# Patient Record
Sex: FEMALE | Race: BLACK OR AFRICAN AMERICAN | Employment: UNEMPLOYED | URBAN - METROPOLITAN AREA
[De-identification: names, ages, dates, MRNs, and addresses within clinical notes are randomized per-mention and may not be internally consistent; named-entity substitution may affect disease eponyms.]

---

## 2020-03-29 ENCOUNTER — ED HISTORICAL/CONVERTED ENCOUNTER (OUTPATIENT)
Dept: OTHER | Age: 55
End: 2020-03-29

## 2020-07-05 ENCOUNTER — HOSPITAL ENCOUNTER (INPATIENT)
Age: 55
LOS: 3 days | Discharge: HOME | DRG: 192 | End: 2020-07-10
Attending: EMERGENCY MEDICINE | Admitting: STUDENT IN AN ORGANIZED HEALTH CARE EDUCATION/TRAINING PROGRAM

## 2020-07-05 ENCOUNTER — APPOINTMENT (OUTPATIENT)
Dept: GENERAL RADIOLOGY | Age: 55
DRG: 192 | End: 2020-07-05
Attending: EMERGENCY MEDICINE

## 2020-07-05 DIAGNOSIS — E87.6 HYPOKALEMIA: ICD-10-CM

## 2020-07-05 DIAGNOSIS — J44.1 COPD EXACERBATION (HCC): Primary | ICD-10-CM

## 2020-07-05 LAB
ALBUMIN SERPL-MCNC: 3.5 G/DL (ref 3.5–5)
ALBUMIN/GLOB SERPL: 0.9 {RATIO} (ref 1.1–2.2)
ALP SERPL-CCNC: 79 U/L (ref 45–117)
ALT SERPL-CCNC: 13 U/L (ref 12–78)
ANION GAP SERPL CALC-SCNC: 8 MMOL/L (ref 5–15)
AST SERPL-CCNC: 12 U/L (ref 15–37)
BASOPHILS # BLD: 0 K/UL (ref 0–0.1)
BASOPHILS NFR BLD: 0 % (ref 0–1)
BILIRUB SERPL-MCNC: 0.7 MG/DL (ref 0.2–1)
BUN SERPL-MCNC: 9 MG/DL (ref 6–20)
BUN/CREAT SERPL: 13 (ref 12–20)
CALCIUM SERPL-MCNC: 8.5 MG/DL (ref 8.5–10.1)
CHLORIDE SERPL-SCNC: 105 MMOL/L (ref 97–108)
CK SERPL-CCNC: 80 U/L (ref 26–192)
CO2 SERPL-SCNC: 29 MMOL/L (ref 21–32)
COVID-19 RAPID TEST, COVR: NOT DETECTED
CREAT SERPL-MCNC: 0.72 MG/DL (ref 0.55–1.02)
DIFFERENTIAL METHOD BLD: ABNORMAL
EOSINOPHIL # BLD: 0.1 K/UL (ref 0–0.4)
EOSINOPHIL NFR BLD: 1 % (ref 0–7)
ERYTHROCYTE [DISTWIDTH] IN BLOOD BY AUTOMATED COUNT: 16 % (ref 11.5–14.5)
GLOBULIN SER CALC-MCNC: 3.9 G/DL (ref 2–4)
GLUCOSE SERPL-MCNC: 130 MG/DL (ref 65–100)
HCT VFR BLD AUTO: 44.7 % (ref 35–47)
HGB BLD-MCNC: 14.3 G/DL (ref 11.5–16)
IMM GRANULOCYTES # BLD AUTO: 0 K/UL (ref 0–0.04)
IMM GRANULOCYTES NFR BLD AUTO: 0 % (ref 0–0.5)
LYMPHOCYTES # BLD: 2.6 K/UL (ref 0.8–3.5)
LYMPHOCYTES NFR BLD: 29 % (ref 12–49)
MAGNESIUM SERPL-MCNC: 1.9 MG/DL (ref 1.6–2.4)
MCH RBC QN AUTO: 27.8 PG (ref 26–34)
MCHC RBC AUTO-ENTMCNC: 32 G/DL (ref 30–36.5)
MCV RBC AUTO: 86.8 FL (ref 80–99)
MONOCYTES # BLD: 0.6 K/UL (ref 0–1)
MONOCYTES NFR BLD: 7 % (ref 5–13)
NEUTS SEG # BLD: 5.7 K/UL (ref 1.8–8)
NEUTS SEG NFR BLD: 63 % (ref 32–75)
NRBC # BLD: 0 K/UL (ref 0–0.01)
NRBC BLD-RTO: 0 PER 100 WBC
PLATELET # BLD AUTO: 278 K/UL (ref 150–400)
PMV BLD AUTO: 10.5 FL (ref 8.9–12.9)
POTASSIUM SERPL-SCNC: 2.6 MMOL/L (ref 3.5–5.1)
POTASSIUM SERPL-SCNC: 4.7 MMOL/L (ref 3.5–5.1)
PROT SERPL-MCNC: 7.4 G/DL (ref 6.4–8.2)
RBC # BLD AUTO: 5.15 M/UL (ref 3.8–5.2)
SARS-COV-2, COV2: NOT DETECTED
SODIUM SERPL-SCNC: 142 MMOL/L (ref 136–145)
SOURCE, COVRS: NORMAL
SPECIMEN SOURCE, FCOV2M: NORMAL
SPECIMEN SOURCE, FCOV2M: NORMAL
TROPONIN I SERPL-MCNC: <0.05 NG/ML
TROPONIN I SERPL-MCNC: <0.05 NG/ML
WBC # BLD AUTO: 9 K/UL (ref 3.6–11)

## 2020-07-05 PROCEDURE — 96376 TX/PRO/DX INJ SAME DRUG ADON: CPT

## 2020-07-05 PROCEDURE — 83735 ASSAY OF MAGNESIUM: CPT

## 2020-07-05 PROCEDURE — 74011000250 HC RX REV CODE- 250: Performed by: EMERGENCY MEDICINE

## 2020-07-05 PROCEDURE — 84132 ASSAY OF SERUM POTASSIUM: CPT

## 2020-07-05 PROCEDURE — 84484 ASSAY OF TROPONIN QUANT: CPT

## 2020-07-05 PROCEDURE — 99218 HC RM OBSERVATION: CPT

## 2020-07-05 PROCEDURE — 71045 X-RAY EXAM CHEST 1 VIEW: CPT

## 2020-07-05 PROCEDURE — 96367 TX/PROPH/DG ADDL SEQ IV INF: CPT

## 2020-07-05 PROCEDURE — 93005 ELECTROCARDIOGRAM TRACING: CPT

## 2020-07-05 PROCEDURE — 94640 AIRWAY INHALATION TREATMENT: CPT

## 2020-07-05 PROCEDURE — 96365 THER/PROPH/DIAG IV INF INIT: CPT

## 2020-07-05 PROCEDURE — 94760 N-INVAS EAR/PLS OXIMETRY 1: CPT

## 2020-07-05 PROCEDURE — 36415 COLL VENOUS BLD VENIPUNCTURE: CPT

## 2020-07-05 PROCEDURE — 80053 COMPREHEN METABOLIC PANEL: CPT

## 2020-07-05 PROCEDURE — 94761 N-INVAS EAR/PLS OXIMETRY MLT: CPT

## 2020-07-05 PROCEDURE — 96375 TX/PRO/DX INJ NEW DRUG ADDON: CPT

## 2020-07-05 PROCEDURE — 87635 SARS-COV-2 COVID-19 AMP PRB: CPT

## 2020-07-05 PROCEDURE — 74011250637 HC RX REV CODE- 250/637: Performed by: STUDENT IN AN ORGANIZED HEALTH CARE EDUCATION/TRAINING PROGRAM

## 2020-07-05 PROCEDURE — 74011250637 HC RX REV CODE- 250/637: Performed by: EMERGENCY MEDICINE

## 2020-07-05 PROCEDURE — 82550 ASSAY OF CK (CPK): CPT

## 2020-07-05 PROCEDURE — 74011250636 HC RX REV CODE- 250/636: Performed by: EMERGENCY MEDICINE

## 2020-07-05 PROCEDURE — 96366 THER/PROPH/DIAG IV INF ADDON: CPT

## 2020-07-05 PROCEDURE — 77030029684 HC NEB SM VOL KT MONA -A

## 2020-07-05 PROCEDURE — 96372 THER/PROPH/DIAG INJ SC/IM: CPT

## 2020-07-05 PROCEDURE — 74011250636 HC RX REV CODE- 250/636: Performed by: STUDENT IN AN ORGANIZED HEALTH CARE EDUCATION/TRAINING PROGRAM

## 2020-07-05 PROCEDURE — 85025 COMPLETE CBC W/AUTO DIFF WBC: CPT

## 2020-07-05 PROCEDURE — 99285 EMERGENCY DEPT VISIT HI MDM: CPT

## 2020-07-05 RX ORDER — IPRATROPIUM BROMIDE AND ALBUTEROL SULFATE 2.5; .5 MG/3ML; MG/3ML
3 SOLUTION RESPIRATORY (INHALATION)
Status: DISCONTINUED | OUTPATIENT
Start: 2020-07-05 | End: 2020-07-10 | Stop reason: HOSPADM

## 2020-07-05 RX ORDER — BUDESONIDE AND FORMOTEROL FUMARATE DIHYDRATE 160; 4.5 UG/1; UG/1
2 AEROSOL RESPIRATORY (INHALATION)
Status: ON HOLD | COMMUNITY
End: 2020-07-06

## 2020-07-05 RX ORDER — FLUTICASONE PROPIONATE AND SALMETEROL 250; 50 UG/1; UG/1
1 POWDER RESPIRATORY (INHALATION) EVERY 12 HOURS
Status: ON HOLD | COMMUNITY
End: 2020-07-06

## 2020-07-05 RX ORDER — POTASSIUM CHLORIDE 750 MG/1
40 TABLET, FILM COATED, EXTENDED RELEASE ORAL
Status: COMPLETED | OUTPATIENT
Start: 2020-07-05 | End: 2020-07-05

## 2020-07-05 RX ORDER — MONTELUKAST SODIUM 10 MG/1
10 TABLET ORAL
Status: DISCONTINUED | OUTPATIENT
Start: 2020-07-05 | End: 2020-07-10 | Stop reason: HOSPADM

## 2020-07-05 RX ORDER — PREDNISONE 1 MG/1
TABLET ORAL
Status: ON HOLD | COMMUNITY
End: 2020-07-06

## 2020-07-05 RX ORDER — ENOXAPARIN SODIUM 100 MG/ML
40 INJECTION SUBCUTANEOUS EVERY 24 HOURS
Status: DISCONTINUED | OUTPATIENT
Start: 2020-07-05 | End: 2020-07-10 | Stop reason: HOSPADM

## 2020-07-05 RX ORDER — ALBUTEROL SULFATE 2.5 MG/.5ML
10 SOLUTION RESPIRATORY (INHALATION)
Status: COMPLETED | OUTPATIENT
Start: 2020-07-05 | End: 2020-07-05

## 2020-07-05 RX ORDER — SODIUM CHLORIDE 0.9 % (FLUSH) 0.9 %
5-40 SYRINGE (ML) INJECTION EVERY 8 HOURS
Status: DISCONTINUED | OUTPATIENT
Start: 2020-07-05 | End: 2020-07-10 | Stop reason: HOSPADM

## 2020-07-05 RX ORDER — MAGNESIUM SULFATE 1 G/100ML
1 INJECTION INTRAVENOUS ONCE
Status: COMPLETED | OUTPATIENT
Start: 2020-07-05 | End: 2020-07-05

## 2020-07-05 RX ORDER — POTASSIUM CHLORIDE 7.45 MG/ML
10 INJECTION INTRAVENOUS
Status: DISCONTINUED | OUTPATIENT
Start: 2020-07-05 | End: 2020-07-05

## 2020-07-05 RX ORDER — IBUPROFEN 200 MG
1 TABLET ORAL DAILY
Status: DISCONTINUED | OUTPATIENT
Start: 2020-07-05 | End: 2020-07-10 | Stop reason: HOSPADM

## 2020-07-05 RX ORDER — POTASSIUM CHLORIDE 750 MG/1
40 TABLET, FILM COATED, EXTENDED RELEASE ORAL EVERY 4 HOURS
Status: DISPENSED | OUTPATIENT
Start: 2020-07-05 | End: 2020-07-05

## 2020-07-05 RX ORDER — SODIUM CHLORIDE 0.9 % (FLUSH) 0.9 %
5-40 SYRINGE (ML) INJECTION AS NEEDED
Status: DISCONTINUED | OUTPATIENT
Start: 2020-07-05 | End: 2020-07-10 | Stop reason: HOSPADM

## 2020-07-05 RX ORDER — ALBUTEROL SULFATE 2.5 MG/.5ML
5 SOLUTION RESPIRATORY (INHALATION)
Status: COMPLETED | OUTPATIENT
Start: 2020-07-05 | End: 2020-07-05

## 2020-07-05 RX ORDER — SODIUM CHLORIDE 9 MG/ML
100 INJECTION, SOLUTION INTRAVENOUS ONCE
Status: COMPLETED | OUTPATIENT
Start: 2020-07-05 | End: 2020-07-05

## 2020-07-05 RX ORDER — IPRATROPIUM BROMIDE AND ALBUTEROL SULFATE 2.5; .5 MG/3ML; MG/3ML
3 SOLUTION RESPIRATORY (INHALATION)
Status: DISCONTINUED | OUTPATIENT
Start: 2020-07-05 | End: 2020-07-05

## 2020-07-05 RX ORDER — IPRATROPIUM BROMIDE AND ALBUTEROL SULFATE 2.5; .5 MG/3ML; MG/3ML
3 SOLUTION RESPIRATORY (INHALATION)
Status: COMPLETED | OUTPATIENT
Start: 2020-07-05 | End: 2020-07-05

## 2020-07-05 RX ADMIN — POTASSIUM CHLORIDE 10 MEQ: 7.46 INJECTION, SOLUTION INTRAVENOUS at 10:19

## 2020-07-05 RX ADMIN — ENOXAPARIN SODIUM 40 MG: 40 INJECTION SUBCUTANEOUS at 12:19

## 2020-07-05 RX ADMIN — IPRATROPIUM BROMIDE AND ALBUTEROL SULFATE 3 ML: .5; 3 SOLUTION RESPIRATORY (INHALATION) at 07:19

## 2020-07-05 RX ADMIN — IPRATROPIUM BROMIDE AND ALBUTEROL SULFATE 3 ML: .5; 3 SOLUTION RESPIRATORY (INHALATION) at 20:01

## 2020-07-05 RX ADMIN — POTASSIUM BICARBONATE 40 MEQ: 782 TABLET, EFFERVESCENT ORAL at 12:15

## 2020-07-05 RX ADMIN — POTASSIUM BICARBONATE 40 MEQ: 782 TABLET, EFFERVESCENT ORAL at 12:16

## 2020-07-05 RX ADMIN — IPRATROPIUM BROMIDE AND ALBUTEROL SULFATE 3 ML: .5; 3 SOLUTION RESPIRATORY (INHALATION) at 23:48

## 2020-07-05 RX ADMIN — POTASSIUM CHLORIDE 40 MEQ: 750 TABLET, EXTENDED RELEASE ORAL at 08:01

## 2020-07-05 RX ADMIN — POTASSIUM CHLORIDE 40 MEQ: 750 TABLET, EXTENDED RELEASE ORAL at 13:54

## 2020-07-05 RX ADMIN — SODIUM CHLORIDE 100 ML/HR: 900 INJECTION, SOLUTION INTRAVENOUS at 08:22

## 2020-07-05 RX ADMIN — ALBUTEROL SULFATE 5 MG: 2.5 SOLUTION RESPIRATORY (INHALATION) at 08:05

## 2020-07-05 RX ADMIN — MAGNESIUM SULFATE IN DEXTROSE 1 G: 10 INJECTION, SOLUTION INTRAVENOUS at 08:01

## 2020-07-05 RX ADMIN — METHYLPREDNISOLONE SODIUM SUCCINATE 40 MG: 40 INJECTION, POWDER, FOR SOLUTION INTRAMUSCULAR; INTRAVENOUS at 21:56

## 2020-07-05 RX ADMIN — Medication 10 ML: at 21:56

## 2020-07-05 RX ADMIN — ALBUTEROL SULFATE 10 MG: 2.5 SOLUTION RESPIRATORY (INHALATION) at 09:23

## 2020-07-05 RX ADMIN — METHYLPREDNISOLONE SODIUM SUCCINATE 40 MG: 40 INJECTION, POWDER, FOR SOLUTION INTRAMUSCULAR; INTRAVENOUS at 14:21

## 2020-07-05 RX ADMIN — POTASSIUM CHLORIDE 10 MEQ: 7.46 INJECTION, SOLUTION INTRAVENOUS at 09:08

## 2020-07-05 RX ADMIN — IPRATROPIUM BROMIDE AND ALBUTEROL SULFATE 3 ML: .5; 3 SOLUTION RESPIRATORY (INHALATION) at 15:18

## 2020-07-05 RX ADMIN — MONTELUKAST SODIUM 10 MG: 10 TABLET, FILM COATED ORAL at 21:55

## 2020-07-05 NOTE — ED TRIAGE NOTES
Pt with hx of asthma , emphysema and COPD presents to the ED by EMS with c/o SOB. Pt recently moved from [place] and has been out of her albuterol inhaler for at least a week. Pt stated she smokes a pack of cigarettes a day. Pt reports mid sternal chest pain that is non radiating. Denies N/V/D. Denies a cardiac history. Pt reports a productive cough that she has had since she was diagnosed with COPD. Pt is alert, oriented and appropriate. Ambulatory on arrival. Pt has mid sternal tenderness. Denies heavy lifting or doing anything abnormal. Auditory wheezing and congested/productive cough heard. EMS placed a 20G in the left hand. Pt also received 324 of ASA, 0.25 mg of terbutaline and 10 MG of decadron PTA. Emergency Department Nursing Plan of Care       The Nursing Plan of Care is developed from the Nursing assessment and Emergency Department Attending provider initial evaluation. The plan of care may be reviewed in the ED Provider note.     The Plan of Care was developed with the following considerations:   Patient / Family readiness to learn indicated by:verbalized understanding  Persons(s) to be included in education: patient  Barriers to Learning/Limitations:No    Signed     Kittitian Bras    7/5/2020   6:38 AM

## 2020-07-05 NOTE — ED NOTES
Pt requesting to use the bathroom. Bedside commode at bedside. Pt able to transfer without assistance.

## 2020-07-05 NOTE — ED NOTES
TRANSFER - OUT REPORT:    Verbal report given to Juanito Pompa RN (name) on Michela Link  being transferred to Med/Tele(unit) for routine progression of care       Report consisted of patients Situation, Background, Assessment and   Recommendations(SBAR). Information from the following report(s) SBAR, ED Summary, Procedure Summary, MAR, Recent Results, Med Rec Status and Cardiac Rhythm NSR was reviewed with the receiving nurse. Lines:   Peripheral IV 07/05/20 Left Hand (Active)   Site Assessment Clean, dry, & intact 7/5/2020  7:02 AM   Phlebitis Assessment 0 7/5/2020  7:02 AM   Infiltration Assessment 0 7/5/2020  7:02 AM   Dressing Status Clean, dry, & intact 7/5/2020  7:02 AM   Dressing Type Transparent 7/5/2020  7:02 AM   Hub Color/Line Status Pink 7/5/2020  7:02 AM        Opportunity for questions and clarification was provided.       Patient transported with:   Registered Nurse

## 2020-07-05 NOTE — ED PROVIDER NOTES
EMERGENCY DEPARTMENT HISTORY AND PHYSICAL EXAM      Date: 7/5/2020  Patient Name: Meghana Ryan    History of Presenting Illness     Chief Complaint   Patient presents with    Shortness of Breath       History Provided By: Patient    HPI: Meghana Ryan, 54 y.o. female with history of asthma, COPD without baseline O2 requirements, tobacco abuse presents to the ED with cc of shortness of breath and wheezing. Symptoms have been present over the past 3 to 4 days but got worse today. Patient was recently kicked out of her home by her niece and has been staying with her fiancé. She states that she does not have access to her medications including nebulizer treatments. She denies any fevers but does endorse mostly dry nonproductive cough with chest tightness and chest pressure which got worse today. Denies any exposure to sick contacts. She states this does feel like COPD exacerbation. There are no other complaints, changes, or physical findings at this time. PCP: None    No current facility-administered medications on file prior to encounter. No current outpatient medications on file prior to encounter. Past History     Past Medical History:  Past Medical History:   Diagnosis Date    Asthma     COPD (chronic obstructive [facility] disease) ([facility] [address]     Emphysema lung ([facility] [address]        Past Surgical History:  History reviewed. No pertinent surgical history. Family History:  History reviewed. No pertinent family history. Social History:  Social History     Tobacco Use    Smoking status: Current Every Day Smoker     Packs/day: 1.00    Tobacco comment: 37 years   Substance Use Topics    Alcohol use: Not on file     Comment: occasional drinking    Drug use: Yes     Types: Marijuana       Allergies:  No Known Allergies      Review of Systems   Review of Systems   Constitutional: Negative for chills and fever. HENT: Negative. Eyes: Negative for visual disturbance.    Respiratory: Positive for cough, chest tightness, shortness of breath and wheezing. Cardiovascular: Positive for chest pain. Negative for leg swelling. Gastrointestinal: Negative for abdominal pain, nausea and vomiting. Genitourinary: Negative. Musculoskeletal: Negative for back pain and gait problem. Skin: Negative for color change and rash. Neurological: Negative for dizziness, weakness, light-headedness and headaches. Hematological: Does not bruise/bleed easily. All other systems reviewed and are negative. Physical Exam   Physical Exam  Vitals signs and nursing note reviewed. Constitutional:       General: She is not in acute distress. Appearance: Normal appearance. She is not ill-appearing or toxic-appearing. HENT:      Head: Normocephalic and atraumatic. Nose: Nose normal.      Mouth/Throat:      Mouth: Mucous membranes are moist.   Eyes:      Extraocular Movements: Extraocular movements intact. Pupils: Pupils are equal, round, and reactive to light. Neck:      Musculoskeletal: Normal range of motion and neck supple. Cardiovascular:      Rate and Rhythm: Normal rate and regular rhythm. Heart sounds: No murmur. Pulmonary:      Effort: Tachypnea and accessory muscle usage present. Breath sounds: Wheezing present. Comments: Increased respiratory effort with audible inspiratory/expiratory wheezes throughout all lung fields  Abdominal:      General: There is no distension. Palpations: Abdomen is soft. Tenderness: There is no abdominal tenderness. There is no guarding or rebound. Musculoskeletal: Normal range of motion. General: No swelling or tenderness. Right lower leg: No edema. Left lower leg: No edema. Skin:     General: Skin is warm and dry. Coloration: Skin is not pale. Findings: No erythema. Neurological:      General: No focal deficit present. Mental Status: She is alert and oriented to person, place, and time.          Diagnostic Study Results     Labs -     Recent Results (from the past 12 hour(s))   CBC WITH AUTOMATED DIFF    Collection Time: 07/05/20  7:17 AM   Result Value Ref Range    WBC 9.0 3.6 - 11.0 K/uL    RBC 5.15 3.80 - 5.20 M/uL    HGB 14.3 11.5 - 16.0 g/dL    HCT 44.7 35.0 - 47.0 %    MCV 86.8 80.0 - 99.0 FL    MCH 27.8 26.0 - 34.0 PG    MCHC 32.0 30.0 - 36.5 g/dL    RDW 16.0 (H) 11.5 - 14.5 %    PLATELET 293 296 - 072 K/uL    MPV 10.5 8.9 - 12.9 FL    NRBC 0.0 0  WBC    ABSOLUTE NRBC 0.00 0.00 - 0.01 K/uL    NEUTROPHILS 63 32 - 75 %    LYMPHOCYTES 29 12 - 49 %    MONOCYTES 7 5 - 13 %    EOSINOPHILS 1 0 - 7 %    BASOPHILS 0 0 - 1 %    IMMATURE GRANULOCYTES 0 0.0 - 0.5 %    ABS. NEUTROPHILS 5.7 1.8 - 8.0 K/UL    ABS. LYMPHOCYTES 2.6 0.8 - 3.5 K/UL    ABS. MONOCYTES 0.6 0.0 - 1.0 K/UL    ABS. EOSINOPHILS 0.1 0.0 - 0.4 K/UL    ABS. BASOPHILS 0.0 0.0 - 0.1 K/UL    ABS. IMM. GRANS. 0.0 0.00 - 0.04 K/UL    DF AUTOMATED     METABOLIC PANEL, COMPREHENSIVE    Collection Time: 07/05/20  7:17 AM   Result Value Ref Range    Sodium 142 136 - 145 mmol/L    Potassium 2.6 (LL) 3.5 - 5.1 mmol/L    Chloride 105 97 - 108 mmol/L    CO2 29 21 - 32 mmol/L    Anion gap 8 5 - 15 mmol/L    Glucose 130 (H) 65 - 100 mg/dL    BUN 9 6 - 20 MG/DL    Creatinine 0.72 0.55 - 1.02 MG/DL    BUN/Creatinine ratio 13 12 - 20      GFR est AA >60 >60 ml/min/1.73m2    GFR est non-AA >60 >60 ml/min/1.73m2    Calcium 8.5 8.5 - 10.1 MG/DL    Bilirubin, total 0.7 0.2 - 1.0 MG/DL    ALT (SGPT) 13 12 - 78 U/L    AST (SGOT) 12 (L) 15 - 37 U/L    Alk.  phosphatase 79 45 - 117 U/L    Protein, total 7.4 6.4 - 8.2 g/dL    Albumin 3.5 3.5 - 5.0 g/dL    Globulin 3.9 2.0 - 4.0 g/dL    A-G Ratio 0.9 (L) 1.1 - 2.2     TROPONIN I    Collection Time: 07/05/20  7:17 AM   Result Value Ref Range    Troponin-I, Qt. <0.05 <0.05 ng/mL   CK W/ REFLX CKMB    Collection Time: 07/05/20  7:17 AM   Result Value Ref Range    CK 80 26 - 192 U/L   MAGNESIUM    Collection Time: 07/05/20  7:17 AM Result Value Ref Range    Magnesium 1.9 1.6 - 2.4 mg/dL   SARS-COV-2    Collection Time: 07/05/20 10:24 AM   Result Value Ref Range    Specimen source Nasopharyngeal      SARS-CoV-2 PENDING     SARS-CoV-2 PENDING     Specimen source Nasopharyngeal      COVID-19 rapid test PENDING     COVID-19 PENDING NEG       Radiologic Studies -   XR CHEST PORT   Final Result   IMPRESSION:   No acute process. CT Results  (Last 48 hours)    None        CXR Results  (Last 48 hours)               07/05/20 0754  XR CHEST PORT Final result    Impression:  IMPRESSION:   No acute process. Narrative:  INDICATION: chest pain, SOB       EXAM:  AP CHEST RADIOGRAPH       COMPARISON: None       FINDINGS:       AP portable view of the chest demonstrates a normal cardiomediastinal   silhouette. There is no edema, effusion, consolidation, or pneumothorax. The   osseous structures are unremarkable. Medical Decision Making   I am the first provider for this patient. I reviewed the vital signs, available nursing notes, past medical history, past surgical history, family history and social history. Vital Signs-Reviewed the patient's vital signs. Patient Vitals for the past 12 hrs:   Temp Pulse Resp BP SpO2   07/05/20 1030  87 28 120/81 95 %   07/05/20 0930  79 25 129/83 98 %   07/05/20 0923     100 %   07/05/20 0900  84 22 (!) 139/99 93 %   07/05/20 0830  84 25 124/81 95 %   07/05/20 0807     100 %   07/05/20 0719     98 %   07/05/20 0700  88 25 112/73 97 %   07/05/20 0629 97.7 °F (36.5 °C) 93 18 119/88 98 %       Records Reviewed: Nursing Notes, Old Medical Records, Previous Radiology Studies and Previous Laboratory Studies    Provider Notes (Medical Decision Making): This is a 54 y.o. female here with shortness of breath, wheezing, cough, chest tightness.   On examination patient appears clinically well and nontoxic although she does have audible inspiratory and expiratory wheezes throughout all lung fields. Vital signs stable throughout entire ED stay and patient is afebrile, there is no hypoxia present and only mild tachypnea. Differential diagnosis includes: COPD versus asthma exacerbation, bronchitis, pneumonia, ACS, PE. I will pursue work-up consisting of x-ray, blood work, EKG and reassess the patient. She has already received Decadron, terbutaline by EMS, and a DuoNeb prior to my evaluation, I will administer another breathing treatment of 5 mg albuterol and reassess. ED Course:   Initial assessment performed. The patients presenting problems have been discussed, and they are in agreement with the care plan formulated and outlined with them. I have encouraged them to ask questions as they arise throughout their visit. ED Course as of Jul 05 1051   Sun Jul 05, 2020   0710 EKG per my interpretation normal sinus rhythm, rate 91 bpm, normal axis, no acute ischemic changes noted. [AK]   [id number] Informed of critical result hypokalemia 2.6. Will replete with p.o. K-Dur 40 mEq, 1 mg IV magnesium, and multiple IV potassium runs   Potassium(!!): 2.6 [AK]   0903 Patient still short of breath, still with significant wheezing. Currently finished with DuoNeb and then 5 mg albuterol. I will initiate another breathing treatment of 10 mg albuterol and reassess. She will require admission if there is no significant improvement. Currently receiving potassium and magnesium IV    [AK]   1050 Patient still short of breath with significant wheezing despite almost 20 mg albuterol, Atrovent, IV magnesium, terbutaline and Decadron prior to arrival.  Will plan for admission at this time. Patient has refused ABG. [AK]   1050 I discussed with Dr. Gilles Nation, hospitalist, who has accepted patient for admission.    Edyth Figures      ED Course User Index  [AK] Charo Kessler MD        Critical Care Note  I have spent 39 minutes of critical care time in evaluating and treating this patient.   This includes time spent at bedside, time with family and decision makers, documentation, review of labs and imaging, and/or consultation with specialists. It does not include time spent on separately billed procedures. This patient presents with a critical illness or injury that acutely impairs one or more vital organ systems such that there is a high probability of imminent or life threatening deterioration in the patient's condition. This case involved decision making of high complexity to assess, manipulate, and support vital organ system failure and/or to prevent further life threatening deterioration of the patient's condition. Failure to initiate these interventions on an urgent basis would likely result in sudden, clinically significant or life threatening deterioration in the patient's condition. This case required my direct attention, intervention, and personal management for the time documented above. This time does not include separately billed interventions/procedures which are separately documented. Abnormal findings supporting critical care: Hypokalemia, COPD exacerbation requiring multiple breathing treatments  Interventions to support critical care: Multiple breathing treatments, frequent reassessment, administration of p.o. and IV potassium with multiple runs for electrolyte replacement  Failure to intervene may result in: Electrolyte abnormality, dysrhythmia, hypoxia, worsening respiratory status, respiratory failure      TOBACCO COUNSELING:  Upon evaluation, the patient expressed that they are a current tobacco user. For at least 3 minutes, the patient has been counseled on the dangers of smoking and was encouraged to quit as soon as possible in order to decrease further risks to their health. Patient has conveyed their understanding of the risks involved should they continue to use tobacco products. Patient has been offered various resources to help with their tobacco dependence.       Admission Note:  Patient is being admitted to the hospital by Dr. Lito Draper, Service: Hospitalist.  The results of their tests and reasons for their admission have been discussed with them and available family. They convey agreement and understanding for the need to be admitted and for their admission diagnosis. Disposition:  Admit      Diagnosis     Clinical Impression:   1. COPD exacerbation ([address]    2. Hypokalemia        Attestations:    Mariano Foster MD    Please note that this dictation was completed with [employer], the computer voice recognition software. Quite often unanticipated grammatical, syntax, homophones, and other interpretive errors are inadvertently transcribed by the computer software. Please disregard these errors. Please excuse any errors that have escaped final proofreading. Thank you.

## 2020-07-05 NOTE — PROGRESS NOTES
Primary Nurse Rhonda De La Rosa RN and BALTA Grant performed a dual skin assessment on this patient No impairment noted

## 2020-07-05 NOTE — PROGRESS NOTES
Pt says she fills her prescriptions at [facility] in [facility].

## 2020-07-05 NOTE — H&P
Hospitalist Admission Note    NAME: Mary Sims   :  1965   MRN:  [id number]   Room Number: 967/80  @ [facility]     Date/Time:  2020 12:16 PM    Patient PCP: None  ______________________________________________________________________  Given the patient's current clinical presentation, I have a high level of concern for decompensation if discharged from the emergency department. Complex decision making was performed, which includes reviewing the patient's available past medical records, laboratory results, and x-ray films. My assessment of this patient's clinical condition and my plan of care is as follows. Assessment / Plan: Active Problems:    COPD exacerbation ([address] (2020)      Acute exacerbation of COPD POA   Likely triggers : ongoing cigarette smoking, lack of maintenance medications. Increased cough, wheezing, dyspnea. Smokes 1 ppd. CXR normal. Normal WBC. Denies exposure to sick contact and mostly stays at home however did move here from [place] last month and ws homeless until last month. - [facility], PRN  - Solumedrol 40 mg IV Q8H  - Montelukast 10 mg nightly  - Nicotine patch, smoking cessation counselling.   - check sars cov-2    Person under investigation for SARS COV-2 POA   Hx of homelessness, moved from out of [facility],, respiratory symptoms. CXR clear. Rule out COVID. - check SARS COV-2  - Droplet plus precautions       Hypokalemia POA  - repletion provided. Hyperglycemia POA   No results found for: HBA1C, HGBE8, WHR7NYNY, KZQ6DMWS  - Check A1c with AM labs. - POC glucose AC HS, insulin lispro sliding scale. Monitor for steroid induced hyperglycemia      Body mass index is 36.87 kg/m². Morbid obesity   Counseled on Lifestyle modifications, AHA Diet ,weight loss strategies.         Tobacco dependence   - nicotine patch   - Patient was counseled extensively on the need to abstain from tobacco, its addictive tendencies, its deleterious effects on the lungs, cardiovascular  as well as its financial & social sequelae      Code Status: full   Surrogate Decision Maker:  Name:     Rel:  Sun Shelton     Daughter Home Ph:  Work Ph:  Mobile Ph: [phone]      Pref Ph.  Mobile phone [3]         DVT Prophylaxis: Lovenox  GI Prophylaxis: not indicated    Baseline: amb independently         Subjective:   CHIEF COMPLAINT: shortness of breath, wheezing     HISTORY OF PRESENT ILLNESS:     Alpesh Luna is a 54 y.o.  female with PMH of cigarette smoking, COPD who presents to ED with c/o shortness of breath, wheezing. Patient reports worsening dyspnea, initially in addition hours, wheezing over the past 3 to 4 days prompting visitation to the ER. She smokes 1 pack of cigarettes daily. She has a history of COPD and used to be on Symbicort, add with her, albuterol however has not been on any medication since May as she ran out of them after she moved to [place] with [facility]. Denies fever, chills, exposure to sick contacts. Moved to [place] in the beginning of June. Smokes 1 ppd  Occasional alcohol use  No illicit drug use    Lives with gary  Was homeless until last month, lived in [place]. Now moved to be with [facility] in [place]. Unemployed  Ambulatory independently      We were asked to admit for work up and evaluation of the above problems. Past Medical History:   Diagnosis Date    Asthma     COPD [facility]chronic obstructive [facility] disease) [facility]     Emphysema lung ([facility]         History reviewed. No pertinent surgical history. Social History     Tobacco Use    Smoking status: Current Every Day Smoker     Packs/day: 1.00    Tobacco comment: 37 years   Substance Use Topics    Alcohol use: Not on file     Comment: occasional drinking        History reviewed. No pertinent family history.   No Known Allergies     Prior to Admission medications    Not on File       REVIEW OF SYSTEMS:     I am not able to complete the review of systems because: The patient is intubated and sedated    The patient has altered mental status due to his acute medical problems    The patient has baseline aphasia from prior stroke(s)    The patient has baseline dementia and is not reliable historian    The patient is in acute medical distress and unable to provide information           Total of 12 systems reviewed as follows:       POSITIVE= underlined text  Negative = text not underlined  General:  fever, chills, sweats, generalized weakness, weight loss/gain,      loss of appetite   Eyes:    blurred vision, eye pain, loss of vision, double vision  ENT:    rhinorrhea, pharyngitis   Respiratory:   cough, sputum production, SOB, LIZAMA, wheezing, pleuritic pain   Cardiology:   chest pain, palpitations, orthopnea, PND, edema, syncope   Gastrointestinal:  abdominal pain , N/V, diarrhea, dysphagia, constipation, bleeding   Genitourinary:  frequency, urgency, dysuria, hematuria, incontinence   Muskuloskeletal :  arthralgia, myalgia, back pain  Hematology:  easy bruising, nose or gum bleeding, lymphadenopathy   Dermatological: rash, ulceration, pruritis, color change / jaundice  Endocrine:   hot flashes or polydipsia   Neurological:  headache, dizziness, confusion, focal weakness, paresthesia,     Speech difficulties, memory loss, gait difficulty  Psychological: Feelings of anxiety, depression, agitation    Objective:   VITALS:    Visit Vitals  BP 99/68 (BP 1 Location: Left arm)   Pulse 90   Temp 98.2 °F (36.8 °C)   Resp 18   Ht 5' 1\" (1.549 m)   Wt 88.5 kg (195 lb 1.7 oz)   SpO2 100%   BMI 36.87 kg/m²       PHYSICAL EXAM:    General:    Alert, cooperative, no distress, appears stated age. HEENT: atraumatic, anicteric sclerae, pink conjunctivae     No oral ulcers, mucosa moist, throat clear, dentition fair  Neck:  Supple, symmetrical,  thyroid: non tender  Lungs:   Decreased air entry with bilateral diffuse expiratory wheezing.    Chest wall:  No tenderness  No Accessory muscle use. Heart:   Regular  rhythm,  No  murmur   No edema  Abdomen:   Soft, non-tender. Not distended. Bowel sounds normal  Extremities: No cyanosis. No clubbing,      Skin turgor normal, Capillary refill normal, Radial dial pulse 2+  Skin:     Not pale. Not Jaundiced  No rashes   Psych:  Good insight. Not depressed. Not anxious or agitated. Neurologic: EOMs intact. No facial asymmetry. No aphasia or slurred speech. Symmetrical strength, Sensation grossly intact. Alert and oriented X 4.     ______________________________________________________________________    Care Plan discussed with:  Patient/Family and Nurse    Expected  Disposition:  Home w/Family  ________________________________________________________________________  TOTAL TIME:  40 Minutes    Critical Care Provided     Minutes non procedure based      Comments     Reviewed previous records   >50% of visit spent in counseling and coordination of care  Discussion with patient and/or family and questions answered       ________________________________________________________________________  Signed: Antonia Jones MD    Procedures: see electronic medical records for all procedures/Xrays and details which were not copied into this note but were reviewed prior to creation of Plan.     LAB DATA REVIEWED:    Recent Results (from the past 24 hour(s))   CBC WITH AUTOMATED DIFF    Collection Time: 07/05/20  7:17 AM   Result Value Ref Range    WBC 9.0 3.6 - 11.0 K/uL    RBC 5.15 3.80 - 5.20 M/uL    HGB 14.3 11.5 - 16.0 g/dL    HCT 44.7 35.0 - 47.0 %    MCV 86.8 80.0 - 99.0 FL    MCH 27.8 26.0 - 34.0 PG    MCHC 32.0 30.0 - 36.5 g/dL    RDW 16.0 (H) 11.5 - 14.5 %    PLATELET 938 364 - 132 K/uL    MPV 10.5 8.9 - 12.9 FL    NRBC 0.0 0  WBC    ABSOLUTE NRBC 0.00 0.00 - 0.01 K/uL    NEUTROPHILS 63 32 - 75 %    LYMPHOCYTES 29 12 - 49 %    MONOCYTES 7 5 - 13 %    EOSINOPHILS 1 0 - 7 %    BASOPHILS 0 0 - 1 %    IMMATURE GRANULOCYTES 0 0.0 - 0.5 %    ABS. NEUTROPHILS 5.7 1.8 - 8.0 K/UL    ABS. LYMPHOCYTES 2.6 0.8 - 3.5 K/UL    ABS. MONOCYTES 0.6 0.0 - 1.0 K/UL    ABS. EOSINOPHILS 0.1 0.0 - 0.4 K/UL    ABS. BASOPHILS 0.0 0.0 - 0.1 K/UL    ABS. IMM. GRANS. 0.0 0.00 - 0.04 K/UL    DF AUTOMATED     METABOLIC PANEL, COMPREHENSIVE    Collection Time: 07/05/20  7:17 AM   Result Value Ref Range    Sodium 142 136 - 145 mmol/L    Potassium 2.6 (LL) 3.5 - 5.1 mmol/L    Chloride 105 97 - 108 mmol/L    CO2 29 21 - 32 mmol/L    Anion gap 8 5 - 15 mmol/L    Glucose 130 (H) 65 - 100 mg/dL    BUN 9 6 - 20 MG/DL    Creatinine 0.72 0.55 - 1.02 MG/DL    BUN/Creatinine ratio 13 12 - 20      GFR est AA >60 >60 ml/min/1.73m2    GFR est non-AA >60 >60 ml/min/1.73m2    Calcium 8.5 8.5 - 10.1 MG/DL    Bilirubin, total 0.7 0.2 - 1.0 MG/DL    ALT (SGPT) 13 12 - 78 U/L    AST (SGOT) 12 (L) 15 - 37 U/L    Alk.  phosphatase 79 45 - 117 U/L    Protein, total 7.4 6.4 - 8.2 g/dL    Albumin 3.5 3.5 - 5.0 g/dL    Globulin 3.9 2.0 - 4.0 g/dL    A-G Ratio 0.9 (L) 1.1 - 2.2     TROPONIN I    Collection Time: 07/05/20  7:17 AM   Result Value Ref Range    Troponin-I, Qt. <0.05 <0.05 ng/mL   CK W/ REFLX CKMB    Collection Time: 07/05/20  7:17 AM   Result Value Ref Range    CK 80 26 - 192 U/L   MAGNESIUM    Collection Time: 07/05/20  7:17 AM   Result Value Ref Range    Magnesium 1.9 1.6 - 2.4 mg/dL   SARS-COV-2    Collection Time: 07/05/20 10:24 AM   Result Value Ref Range    Specimen source Nasopharyngeal      Specimen source Nasopharyngeal      COVID-19 rapid test Not detected NOTD     TROPONIN I    Collection Time: 07/05/20 10:55 AM   Result Value Ref Range    Troponin-I, Qt. <0.05 <0.05 ng/mL

## 2020-07-05 NOTE — ED NOTES
Verbal shift change report given to Teresa Bella RN  (oncoming nurse) by Parish Downs RN  (offgoing nurse). Report included the following information SBAR.

## 2020-07-06 LAB
ANION GAP SERPL CALC-SCNC: 10 MMOL/L (ref 5–15)
ATRIAL RATE: 91 BPM
BASOPHILS # BLD: 0 K/UL (ref 0–0.1)
BASOPHILS NFR BLD: 0 % (ref 0–1)
BUN SERPL-MCNC: 8 MG/DL (ref 6–20)
BUN/CREAT SERPL: 12 (ref 12–20)
CALCIUM SERPL-MCNC: 8.9 MG/DL (ref 8.5–10.1)
CALCULATED P AXIS, ECG09: 75 DEGREES
CALCULATED R AXIS, ECG10: 47 DEGREES
CALCULATED T AXIS, ECG11: 58 DEGREES
CHLORIDE SERPL-SCNC: 106 MMOL/L (ref 97–108)
CO2 SERPL-SCNC: 28 MMOL/L (ref 21–32)
CREAT SERPL-MCNC: 0.65 MG/DL (ref 0.55–1.02)
DIAGNOSIS, 93000: NORMAL
DIFFERENTIAL METHOD BLD: ABNORMAL
EOSINOPHIL # BLD: 0 K/UL (ref 0–0.4)
EOSINOPHIL NFR BLD: 0 % (ref 0–7)
ERYTHROCYTE [DISTWIDTH] IN BLOOD BY AUTOMATED COUNT: 15.9 % (ref 11.5–14.5)
EST. AVERAGE GLUCOSE BLD GHB EST-MCNC: 126 MG/DL
GLUCOSE BLD STRIP.AUTO-MCNC: 144 MG/DL (ref 65–100)
GLUCOSE BLD STRIP.AUTO-MCNC: 152 MG/DL (ref 65–100)
GLUCOSE BLD STRIP.AUTO-MCNC: 176 MG/DL (ref 65–100)
GLUCOSE SERPL-MCNC: 175 MG/DL (ref 65–100)
HBA1C MFR BLD: 6 % (ref 4–5.6)
HCT VFR BLD AUTO: 42 % (ref 35–47)
HGB BLD-MCNC: 13.4 G/DL (ref 11.5–16)
IMM GRANULOCYTES # BLD AUTO: 0.1 K/UL (ref 0–0.04)
IMM GRANULOCYTES NFR BLD AUTO: 1 % (ref 0–0.5)
LYMPHOCYTES # BLD: 0.7 K/UL (ref 0.8–3.5)
LYMPHOCYTES NFR BLD: 6 % (ref 12–49)
MAGNESIUM SERPL-MCNC: 1.9 MG/DL (ref 1.6–2.4)
MCH RBC QN AUTO: 27.9 PG (ref 26–34)
MCHC RBC AUTO-ENTMCNC: 31.9 G/DL (ref 30–36.5)
MCV RBC AUTO: 87.3 FL (ref 80–99)
MONOCYTES # BLD: 0.3 K/UL (ref 0–1)
MONOCYTES NFR BLD: 3 % (ref 5–13)
NEUTS SEG # BLD: 10 K/UL (ref 1.8–8)
NEUTS SEG NFR BLD: 90 % (ref 32–75)
NRBC # BLD: 0 K/UL (ref 0–0.01)
NRBC BLD-RTO: 0 PER 100 WBC
P-R INTERVAL, ECG05: 158 MS
PHOSPHATE SERPL-MCNC: 2.8 MG/DL (ref 2.6–4.7)
PLATELET # BLD AUTO: 277 K/UL (ref 150–400)
PMV BLD AUTO: 10.6 FL (ref 8.9–12.9)
POTASSIUM SERPL-SCNC: 4.4 MMOL/L (ref 3.5–5.1)
Q-T INTERVAL, ECG07: 368 MS
QRS DURATION, ECG06: 92 MS
QTC CALCULATION (BEZET), ECG08: 452 MS
RBC # BLD AUTO: 4.81 M/UL (ref 3.8–5.2)
RBC MORPH BLD: ABNORMAL
SERVICE CMNT-IMP: ABNORMAL
SODIUM SERPL-SCNC: 144 MMOL/L (ref 136–145)
VENTRICULAR RATE, ECG03: 91 BPM
WBC # BLD AUTO: 11.1 K/UL (ref 3.6–11)

## 2020-07-06 PROCEDURE — 74011250636 HC RX REV CODE- 250/636: Performed by: STUDENT IN AN ORGANIZED HEALTH CARE EDUCATION/TRAINING PROGRAM

## 2020-07-06 PROCEDURE — 83036 HEMOGLOBIN GLYCOSYLATED A1C: CPT

## 2020-07-06 PROCEDURE — 84100 ASSAY OF PHOSPHORUS: CPT

## 2020-07-06 PROCEDURE — 83735 ASSAY OF MAGNESIUM: CPT

## 2020-07-06 PROCEDURE — 82962 GLUCOSE BLOOD TEST: CPT

## 2020-07-06 PROCEDURE — 96372 THER/PROPH/DIAG INJ SC/IM: CPT

## 2020-07-06 PROCEDURE — 36415 COLL VENOUS BLD VENIPUNCTURE: CPT

## 2020-07-06 PROCEDURE — 74011250637 HC RX REV CODE- 250/637: Performed by: STUDENT IN AN ORGANIZED HEALTH CARE EDUCATION/TRAINING PROGRAM

## 2020-07-06 PROCEDURE — 99218 HC RM OBSERVATION: CPT

## 2020-07-06 PROCEDURE — 80048 BASIC METABOLIC PNL TOTAL CA: CPT

## 2020-07-06 PROCEDURE — 85025 COMPLETE CBC W/AUTO DIFF WBC: CPT

## 2020-07-06 PROCEDURE — 74011000250 HC RX REV CODE- 250: Performed by: STUDENT IN AN ORGANIZED HEALTH CARE EDUCATION/TRAINING PROGRAM

## 2020-07-06 PROCEDURE — 74011000250 HC RX REV CODE- 250: Performed by: EMERGENCY MEDICINE

## 2020-07-06 PROCEDURE — 94760 N-INVAS EAR/PLS OXIMETRY 1: CPT

## 2020-07-06 PROCEDURE — 74011250637 HC RX REV CODE- 250/637: Performed by: HOSPITALIST

## 2020-07-06 PROCEDURE — 94640 AIRWAY INHALATION TREATMENT: CPT

## 2020-07-06 PROCEDURE — 96376 TX/PRO/DX INJ SAME DRUG ADON: CPT

## 2020-07-06 PROCEDURE — 74011636637 HC RX REV CODE- 636/637: Performed by: STUDENT IN AN ORGANIZED HEALTH CARE EDUCATION/TRAINING PROGRAM

## 2020-07-06 RX ORDER — ACETYLCYSTEINE 200 MG/ML
200 SOLUTION ORAL; RESPIRATORY (INHALATION) 3 TIMES DAILY
Status: DISCONTINUED | OUTPATIENT
Start: 2020-07-06 | End: 2020-07-10 | Stop reason: HOSPADM

## 2020-07-06 RX ORDER — GUAIFENESIN/DEXTROMETHORPHAN 100-10MG/5
5 SYRUP ORAL
Status: DISCONTINUED | OUTPATIENT
Start: 2020-07-06 | End: 2020-07-10 | Stop reason: HOSPADM

## 2020-07-06 RX ORDER — INSULIN LISPRO 100 [IU]/ML
INJECTION, SOLUTION INTRAVENOUS; SUBCUTANEOUS
Status: DISCONTINUED | OUTPATIENT
Start: 2020-07-06 | End: 2020-07-10 | Stop reason: HOSPADM

## 2020-07-06 RX ORDER — DEXTROSE MONOHYDRATE 100 MG/ML
0-250 INJECTION, SOLUTION INTRAVENOUS AS NEEDED
Status: DISCONTINUED | OUTPATIENT
Start: 2020-07-06 | End: 2020-07-10 | Stop reason: HOSPADM

## 2020-07-06 RX ORDER — BENZONATATE 100 MG/1
200 CAPSULE ORAL 3 TIMES DAILY
Status: DISCONTINUED | OUTPATIENT
Start: 2020-07-06 | End: 2020-07-10 | Stop reason: HOSPADM

## 2020-07-06 RX ORDER — MAGNESIUM SULFATE 100 %
4 CRYSTALS MISCELLANEOUS AS NEEDED
Status: DISCONTINUED | OUTPATIENT
Start: 2020-07-06 | End: 2020-07-10 | Stop reason: HOSPADM

## 2020-07-06 RX ADMIN — IPRATROPIUM BROMIDE AND ALBUTEROL SULFATE 3 ML: .5; 3 SOLUTION RESPIRATORY (INHALATION) at 23:40

## 2020-07-06 RX ADMIN — GUAIFENESIN AND DEXTROMETHORPHAN 5 ML: 100; 10 SYRUP ORAL at 06:12

## 2020-07-06 RX ADMIN — ENOXAPARIN SODIUM 40 MG: 40 INJECTION SUBCUTANEOUS at 11:46

## 2020-07-06 RX ADMIN — Medication 10 ML: at 22:40

## 2020-07-06 RX ADMIN — IPRATROPIUM BROMIDE AND ALBUTEROL SULFATE 3 ML: .5; 3 SOLUTION RESPIRATORY (INHALATION) at 16:01

## 2020-07-06 RX ADMIN — METHYLPREDNISOLONE SODIUM SUCCINATE 40 MG: 40 INJECTION, POWDER, FOR SOLUTION INTRAMUSCULAR; INTRAVENOUS at 06:08

## 2020-07-06 RX ADMIN — IPRATROPIUM BROMIDE AND ALBUTEROL SULFATE 3 ML: .5; 3 SOLUTION RESPIRATORY (INHALATION) at 03:48

## 2020-07-06 RX ADMIN — METHYLPREDNISOLONE SODIUM SUCCINATE 40 MG: 40 INJECTION, POWDER, FOR SOLUTION INTRAMUSCULAR; INTRAVENOUS at 22:00

## 2020-07-06 RX ADMIN — ACETYLCYSTEINE 200 MG: 200 SOLUTION ORAL; RESPIRATORY (INHALATION) at 20:15

## 2020-07-06 RX ADMIN — INSULIN LISPRO 2 UNITS: 100 INJECTION, SOLUTION INTRAVENOUS; SUBCUTANEOUS at 17:27

## 2020-07-06 RX ADMIN — INSULIN LISPRO 2 UNITS: 100 INJECTION, SOLUTION INTRAVENOUS; SUBCUTANEOUS at 11:51

## 2020-07-06 RX ADMIN — BENZONATATE 200 MG: 100 CAPSULE ORAL at 17:28

## 2020-07-06 RX ADMIN — MONTELUKAST SODIUM 10 MG: 10 TABLET, FILM COATED ORAL at 22:40

## 2020-07-06 RX ADMIN — ACETYLCYSTEINE 200 MG: 200 SOLUTION ORAL; RESPIRATORY (INHALATION) at 16:01

## 2020-07-06 RX ADMIN — METHYLPREDNISOLONE SODIUM SUCCINATE 40 MG: 40 INJECTION, POWDER, FOR SOLUTION INTRAMUSCULAR; INTRAVENOUS at 14:32

## 2020-07-06 RX ADMIN — IPRATROPIUM BROMIDE AND ALBUTEROL SULFATE 3 ML: .5; 3 SOLUTION RESPIRATORY (INHALATION) at 12:29

## 2020-07-06 RX ADMIN — BENZONATATE 200 MG: 100 CAPSULE ORAL at 22:40

## 2020-07-06 RX ADMIN — Medication 10 ML: at 14:33

## 2020-07-06 RX ADMIN — IPRATROPIUM BROMIDE AND ALBUTEROL SULFATE 3 ML: .5; 3 SOLUTION RESPIRATORY (INHALATION) at 08:40

## 2020-07-06 RX ADMIN — Medication 10 ML: at 06:08

## 2020-07-06 RX ADMIN — IPRATROPIUM BROMIDE AND ALBUTEROL SULFATE 3 ML: .5; 3 SOLUTION RESPIRATORY (INHALATION) at 20:15

## 2020-07-06 NOTE — PROGRESS NOTES
Reason for Admission:  \" Morris Brewster, 54 y.o. female with history of asthma, COPD without baseline O2 requirements, tobacco abuse presents to the ED with cc of shortness of breath and wheezing\"                   Patient is here under OBSERVATION status. Has Medicaid through [place]. Patient signed OBS letter. Placed in chart and noted on Documentation list.    RUR Score:   8                  Plan for utilizing home health:   No plans at this time       PCP: First and Last name:  None at this time   Name of Practice:    Are you a current patient: Yes/No:    Approximate date of last visit:    Can you participate in a virtual visit with your PCP:                     Current Advanced Directive/Advance Care Plan: No written advanced directives at this time. Patient stated that she would want CPR and ventilation. In the event she were unable to make a decision, she would want her fiance, Karen Galan [phone] to make medical decisions. Transition of Care Plan:        Met with patient for assessment. Currently living with gary. Recently move out of niece's home in [place]. Has no PCP or pulmonologist in [place]. She would like to establish care at this time. Patient stated that she does not have her belongings which include her medicaid card. Discharge Planning-   1. PCP appointment made for 7/21 at 8am with [facility]. Patient will be seeing Padmaja Temple NP.       RITESH Elder/OLIVER  [phone]

## 2020-07-06 NOTE — PROGRESS NOTES
[facility]: MED RECONCILIATION    Comments/Recommendations:   Med rec performed via interview with patient over the phone. Patient states she does not take any medications at home, both RX or OTC. Allergies: Patient has no known allergies.         Diane Dean, PHARMD   Contact: [phone]

## 2020-07-06 NOTE — PROGRESS NOTES
Hospitalist Progress Note    NAME: Abiodun Saunders   :  1965   MRN:  [id number]   Room Number:  706/82  @ [facility]       Interim Hospital Summary: 54 y.o. female whom presented on 2020 with      Assessment / Plan:  Acute exacerbation of COPD POA   Likely triggers : ongoing cigarette smoking, lack of maintenance medications. Increased cough, wheezing, dyspnea. Smokes 1 ppd. CXR normal. Normal WBC. Denies exposure to sick contact and mostly stays at home however did move here from [place] last month and ws homeless until last month.      - [facility], PRN. Add mucomyst  - Robitussin PRN, Benzonatate TID   - Solumedrol 40 mg IV increase to Q6H  - Montelukast 10 mg nightly  - Nicotine patch, smoking cessation counselling. Hyperglycemia POA   Lab Results   Component Value Date/Time    Hemoglobin A1c 6.0 (H) 2020 04:03 AM     - POC glucose AC HS, insulin lispro sliding scale. Monitor for steroid induced hyperglycemia. - Counseled on Lifestyle modifications, ADA Diet ,weight loss strategies.          Body mass index is 36.87 kg/m². Morbid obesity   Counseled on Lifestyle modifications, AHA Diet ,weight loss strategies.          Tobacco dependence   - nicotine patch   - Patient was counseled extensively on the need to abstain from tobacco, its addictive tendencies, its deleterious effects on the lungs, cardiovascular  as well as its financial & social sequelae         SARS COV-2 ruled out by testing. Hx of homelessness, moved from out of [facility],, respiratory symptoms.  CXR clear.         Hypokalemia POA, resolved      Code Status: full   Surrogate Decision Maker:  Name:     Rel:  Sameera Key  Daughter Home Ph:  Work Ph:  Mobile Ph: [phone]     [phone]      Pref Ph.  Mobile phone [3]            DVT Prophylaxis: Lovenox  GI Prophylaxis: not indicated     Baseline: amb independently        Subjective:     Chief Complaint / Reason for Physician Visit  \"still short of breath, I am coughing a lot but nothing is coming out\". Discussed with RN events overnight. Review of Systems:  No fevers, chills, appetite change,sputum production,nausea, vomitting, diarrhea, constipation, chest pain, leg edema, abdominal pain, joint pain, rash, itching. Tolerating diet. Objective:     VITALS:   Last 24hrs VS reviewed since prior progress note. Most recent are:  Patient Vitals for the past 24 hrs:   Temp Pulse Resp BP SpO2   07/06/20 1156 98.4 °F (36.9 °C) 90 17 122/72 94 %   07/06/20 0810 98.7 °F (37.1 °C) 85 18 121/74 95 %   07/06/20 0415 98.2 °F (36.8 °C) 95 17 123/77 100 %   07/06/20 0020 98.6 °F (37 °C) 95 18 (!) 144/91 96 %   07/05/20 2007 97.9 °F (36.6 °C) 80 18 116/90 96 %   07/05/20 1600  89      07/05/20 1519     95 %     No intake or output data in the 24 hours ending 07/06/20 1504     PHYSICAL EXAM:  General: WD, WN. Alert, cooperative, no acute distress    EENT:  EOMI. Anicteric sclerae. MMM  Resp:  Bilateral expiratory wheezing  CV:  Regular  rhythm,  normal S1/S2, no murmurs rubs gallops, No edema  GI:  Soft, Non distended, Non tender. +Bowel sounds  Neurologic:  Alert and oriented X 3, normal speech,   Psych:   Good insight. Not anxious nor agitated  Skin:  No rashes. No jaundice    Reviewed most current lab test results and cultures  YES  Reviewed most current radiology test results   YES  Review and summation of old records today    NO  Reviewed patient's current orders and MAR    YES  PMH/SH reviewed - no change compared to H&P  ________________________________________________________________________  Care Plan discussed with:    Comments   Patient x    Family      RN x    Care Manager x    Consultant                        Multidiciplinary team rounds were held today with , nursing, pharmacist and clinical coordinator. Patient's plan of care was discussed; medications were reviewed and discharge planning was addressed. ________________________________________________________________________  Total NON critical care TIME:  25 Minutes    Total CRITICAL CARE TIME Spent:   Minutes non procedure based      Comments   >50% of visit spent in counseling and coordination of care     ________________________________________________________________________  Gerard James MD     Procedures: see electronic medical records for all procedures/Xrays and details which were not copied into this note but were reviewed prior to creation of Plan. LABS:  I reviewed today's most current labs and imaging studies.   Pertinent labs include:  Recent Labs     07/06/20 0403 07/05/20 0717   WBC 11.1* 9.0   HGB 13.4 14.3   HCT 42.0 44.7    278     Recent Labs     07/06/20  0403 07/05/20  1425 07/05/20 0717     --  142   K 4.4 4.7 2.6*     --  105   CO2 28  --  29   *  --  130*   BUN 8  --  9   CREA 0.65  --  0.72   CA 8.9  --  8.5   MG 1.9  --  1.9   PHOS 2.8  --   --    ALB  --   --  3.5   TBILI  --   --  0.7   ALT  --   --  13       Signed: Gerard James MD

## 2020-07-06 NOTE — FACE TO FACE
Face to Face Order for DME Pt Name  Anurag Duncan Date of Birth 1965 Age  54 y.o. Medical Record Number  [id number] Room Number  885/98 Admit date:  7/5/2020 Date of Face to Face:  7/6/2020 Admission Diagnosis:  COPD Exacerbation Diagnoses COPD Exacerbation Past Medical History:  
Diagnosis Date  Asthma  COPD (chronic obstructive pulmonary disease) (HCC)  Emphysema lung ([address] Visit Vitals /74 (BP 1 Location: Right arm, BP Patient Position: At rest) Pulse 85 Temp 98.7 °F (37.1 °C) Resp 18 Ht 5' 1\" (1.549 m) Wt 88.5 kg (195 lb 1.7 oz) SpO2 95% BMI 36.87 kg/m² No Known Allergies ? I certify that the patient needs Nebulizer as prescribed below and I will not be following this patient in the Community. I have discussed the same with the patient. ? PCP will be responsible for signing the Plan of Care and will follow/coordinate ongoing care. ? I certify that I am taking care of the patient today (Please see hospital Discharge Records for details of clinical issues pertaining to this order). ________________________________________________________________________ Elizabeth Cárdenas MD

## 2020-07-06 NOTE — PROGRESS NOTES
Bedside and Verbal shift change report given to Mary Rn (oncoming nurse) by Farhan Ozuna rn (offgoing nurse). Report included the following information SBAR, Kardex, Intake/Output, MAR, Recent Results and Med Rec Status.

## 2020-07-06 NOTE — PROGRESS NOTES
Pt was able to state that when she lived in [place] and last had her medications filled there she was taking Prednisone, Advair, and Symbicort. She was not able to state doses.

## 2020-07-06 NOTE — PROGRESS NOTES
Bedside shift report received from Mary Negron.pt talking on the phone. call bell in reach,no discomfort noticed at this time. 1030 pt resting  Comfortably. 1300 no acute distress noticed. 1510 pt accidentally pulled her iv,new iv inserted. 1800 pt wants to take shower later tonight.

## 2020-07-06 NOTE — PROGRESS NOTES
Problem: Falls - Risk of  Goal: *Absence of Falls  Description: Document Sujatha Mcclendon Fall Risk and appropriate interventions in the flowsheet.   Outcome: Progressing Towards Goal  Note: Fall Risk Interventions:            Medication Interventions: Teach patient to arise slowly, Patient to call before getting OOB         History of Falls Interventions: Consult care management for discharge planning         Problem: Patient Education: Go to Patient Education Activity  Goal: Patient/Family Education  Outcome: Progressing Towards Goal     Problem: Chronic Obstructive Pulmonary Disease (COPD)  Goal: *Oxygen saturation during activity within specified parameters  Outcome: Progressing Towards Goal  Goal: *Able to remain out of bed as prescribed  Outcome: Progressing Towards Goal  Goal: *Absence of hypoxia  Outcome: Progressing Towards Goal  Goal: *Optimize nutritional status  Outcome: Progressing Towards Goal

## 2020-07-06 NOTE — PROGRESS NOTES
Bedside shift change report given to BALTA Hernandez (oncoming nurse) by Jade Aaron RN (offgoing nurse). Report included SBAR, MAR, kardex, intake and output, recent results, cardiac rhythm, lung sounds, and admission med rec status. Pt has prescriptions filled at [facility] in [place], [place].

## 2020-07-07 LAB
GLUCOSE BLD STRIP.AUTO-MCNC: 110 MG/DL (ref 65–100)
GLUCOSE BLD STRIP.AUTO-MCNC: 135 MG/DL (ref 65–100)
GLUCOSE BLD STRIP.AUTO-MCNC: 156 MG/DL (ref 65–100)
GLUCOSE BLD STRIP.AUTO-MCNC: 167 MG/DL (ref 65–100)
SERVICE CMNT-IMP: ABNORMAL

## 2020-07-07 PROCEDURE — 82962 GLUCOSE BLOOD TEST: CPT

## 2020-07-07 PROCEDURE — 94760 N-INVAS EAR/PLS OXIMETRY 1: CPT

## 2020-07-07 PROCEDURE — 74011250637 HC RX REV CODE- 250/637: Performed by: HOSPITALIST

## 2020-07-07 PROCEDURE — 74011000250 HC RX REV CODE- 250: Performed by: EMERGENCY MEDICINE

## 2020-07-07 PROCEDURE — 99218 HC RM OBSERVATION: CPT

## 2020-07-07 PROCEDURE — 74011250636 HC RX REV CODE- 250/636: Performed by: STUDENT IN AN ORGANIZED HEALTH CARE EDUCATION/TRAINING PROGRAM

## 2020-07-07 PROCEDURE — 94640 AIRWAY INHALATION TREATMENT: CPT

## 2020-07-07 PROCEDURE — 96376 TX/PRO/DX INJ SAME DRUG ADON: CPT

## 2020-07-07 PROCEDURE — 65270000029 HC RM PRIVATE

## 2020-07-07 PROCEDURE — 74011636637 HC RX REV CODE- 636/637: Performed by: STUDENT IN AN ORGANIZED HEALTH CARE EDUCATION/TRAINING PROGRAM

## 2020-07-07 PROCEDURE — 74011250637 HC RX REV CODE- 250/637: Performed by: STUDENT IN AN ORGANIZED HEALTH CARE EDUCATION/TRAINING PROGRAM

## 2020-07-07 PROCEDURE — 74011000250 HC RX REV CODE- 250: Performed by: STUDENT IN AN ORGANIZED HEALTH CARE EDUCATION/TRAINING PROGRAM

## 2020-07-07 RX ADMIN — Medication 10 ML: at 21:47

## 2020-07-07 RX ADMIN — MONTELUKAST SODIUM 10 MG: 10 TABLET, FILM COATED ORAL at 21:47

## 2020-07-07 RX ADMIN — METHYLPREDNISOLONE SODIUM SUCCINATE 40 MG: 40 INJECTION, POWDER, FOR SOLUTION INTRAMUSCULAR; INTRAVENOUS at 18:23

## 2020-07-07 RX ADMIN — ACETYLCYSTEINE 200 MG: 200 SOLUTION ORAL; RESPIRATORY (INHALATION) at 15:37

## 2020-07-07 RX ADMIN — INSULIN LISPRO 2 UNITS: 100 INJECTION, SOLUTION INTRAVENOUS; SUBCUTANEOUS at 17:22

## 2020-07-07 RX ADMIN — BENZONATATE 200 MG: 100 CAPSULE ORAL at 08:16

## 2020-07-07 RX ADMIN — ACETYLCYSTEINE 200 MG: 200 SOLUTION ORAL; RESPIRATORY (INHALATION) at 22:02

## 2020-07-07 RX ADMIN — Medication 10 ML: at 13:23

## 2020-07-07 RX ADMIN — ACETYLCYSTEINE 200 MG: 200 SOLUTION ORAL; RESPIRATORY (INHALATION) at 08:19

## 2020-07-07 RX ADMIN — METHYLPREDNISOLONE SODIUM SUCCINATE 40 MG: 40 INJECTION, POWDER, FOR SOLUTION INTRAMUSCULAR; INTRAVENOUS at 13:23

## 2020-07-07 RX ADMIN — METHYLPREDNISOLONE SODIUM SUCCINATE 40 MG: 40 INJECTION, POWDER, FOR SOLUTION INTRAMUSCULAR; INTRAVENOUS at 06:37

## 2020-07-07 RX ADMIN — ENOXAPARIN SODIUM 40 MG: 40 INJECTION SUBCUTANEOUS at 12:23

## 2020-07-07 RX ADMIN — IPRATROPIUM BROMIDE AND ALBUTEROL SULFATE 3 ML: .5; 3 SOLUTION RESPIRATORY (INHALATION) at 15:37

## 2020-07-07 RX ADMIN — IPRATROPIUM BROMIDE AND ALBUTEROL SULFATE 3 ML: .5; 3 SOLUTION RESPIRATORY (INHALATION) at 03:59

## 2020-07-07 RX ADMIN — BENZONATATE 200 MG: 100 CAPSULE ORAL at 16:20

## 2020-07-07 RX ADMIN — IPRATROPIUM BROMIDE AND ALBUTEROL SULFATE 3 ML: .5; 3 SOLUTION RESPIRATORY (INHALATION) at 20:21

## 2020-07-07 RX ADMIN — BENZONATATE 200 MG: 100 CAPSULE ORAL at 21:47

## 2020-07-07 RX ADMIN — GUAIFENESIN AND DEXTROMETHORPHAN 5 ML: 100; 10 SYRUP ORAL at 06:37

## 2020-07-07 RX ADMIN — IPRATROPIUM BROMIDE AND ALBUTEROL SULFATE 3 ML: .5; 3 SOLUTION RESPIRATORY (INHALATION) at 08:19

## 2020-07-07 RX ADMIN — Medication 10 ML: at 06:37

## 2020-07-07 NOTE — PROGRESS NOTES
Bedside and Verbal shift change report given to Mary Rn (oncoming nurse) by Gale King rn (offgoing nurse). Report included the following information SBAR, Kardex, Intake/Output, MAR, Recent Results and Med Rec Status.

## 2020-07-07 NOTE — PROGRESS NOTES
MARU- IDT met to discuss plan of care. Patient is not ready to be discharged today. Will need nebulizer upon discharge. CM met with patient and she stated that she cannot afford a nebulizer. She has medicaid in [place] but that will not pay for a nebulizer here in [place]. Patient stated that she and her fiance are working on getting Medicaid here in [place]. 56  CM talked with patient's fiance, Karen Galan [phone], about patient needing a nebulizer. Mr. Cesar Contreras stated that he will be able to purchase one when he gets his paycheck on Friday. Discharge planning ongoing.     RITESH Elder/OLIVER  [phone]

## 2020-07-07 NOTE — PROGRESS NOTES
Problem: Falls - Risk of  Goal: *Absence of Falls  Description: Document Donny Palomo Fall Risk and appropriate interventions in the flowsheet.   Outcome: Progressing Towards Goal  Note: Fall Risk Interventions:            Medication Interventions: Patient to call before getting OOB, Teach patient to arise slowly         History of Falls Interventions: Door open when patient unattended, Consult care management for discharge planning, Room close to nurse's station         Problem: Patient Education: Go to Patient Education Activity  Goal: Patient/Family Education  Outcome: Progressing Towards Goal     Problem: Chronic Obstructive Pulmonary Disease (COPD)  Goal: *Oxygen saturation during activity within specified parameters  Outcome: Progressing Towards Goal  Goal: *Able to remain out of bed as prescribed  Outcome: Progressing Towards Goal  Goal: *Absence of hypoxia  Outcome: Progressing Towards Goal  Goal: *Optimize nutritional status  Outcome: Progressing Towards Goal

## 2020-07-07 NOTE — PROGRESS NOTES
Evni Cheung {[facility] BEDSIDE_VERBALshift change report given to Fam Yusuf RN (oncoming nurse) by Annemarie Garcia RN (offgoing nurse). Report included the following information SBAR, Kardex, Intake/Output, MAR, Accordion, Recent Results, Med Rec Status and Alarm Parameters .

## 2020-07-07 NOTE — PROGRESS NOTES
Bedside shift report received from Mary Negrno.pt watching TV.call bell in reach. no discomfort noticed. 4425 pt lung sound very tight and exp wheezing. pt receiving breathing treatment at this time. 1115 pastoral care at bedside. 1215 pt pulled her IV accidentally. pt wants to eat her lunch this time . will try after finish eating. 1315 New iv #24 placed on Rt forearm. 1500 pt watching TV at this time.

## 2020-07-07 NOTE — PHYSICIAN ADVISORY
Letter of Status Determination:  
Recommend hospitalization status upgraded from OBSERVATION  to INPATIENT  Status Pt Name:  Veto Patel MR#  
KAYLI # [id number] / 
[id number] Payor[facility] SELF PAY / [facility] / Product Type: Self Pay /   
SCAR#  [id number] [facility]  211/01  @ [address]  
Hospitalization date  7/5/2020  6:28 AM  
Current Attending Physician  Kika Segura MD  
Principal diagnosis  Copd Exacerbation Clinicals Patient reports worsening dyspnea, initially in addition hours, wheezing over the past 3 to 4 days prompting visitation to the ER. She smokes 1 pack of cigarettes daily Pt w/ acute COPD exacerbations, on IV steroids, duoneb [place] (MCG) criteria Does  NOT apply STATUS DETERMINATION  Inpatient The final decision of the patient's hospitalization status depends on the attending physician's judgment Additional comments Payor: SELF PAY / [facility] / Product Type: Self Pay /   
  
 
Thom Mukherjee MD 
Cell: [phone] Physician Advisor

## 2020-07-07 NOTE — PROGRESS NOTES
Nutrition Assessment:    INTERVENTIONS/RECOMMENDATIONS:   Meals/Snacks: General/healthful diet:  RD will add Consistent Carb Diet for BG management. ASSESSMENT:   7/7:  Chart reviewed; med noted for COPD with exacerbation. RD received nutrition consult for for pre-DM, A1C 6%. Diet Order: Regular  % Eaten:    Patient Vitals for the past 72 hrs:   % Diet Eaten   07/05/20 1400 100 %     Pertinent Medications: [x] Reviewed []Other:  Pertinent Labs: [x]Reviewed  []Other: A1C 6%  Food Allergies: [x]None []Other:     Last BM:7/5    [x]Active     []Hyperactive  []Hypoactive       [] Absent  BS  Skin:    [x] Intact   [] Incision  [] Breakdown   []Edema   []Other:    Anthropometrics: Height: 5' 1\" (154.9 cm) Weight: 88.5 kg (195 lb 1.7 oz)    IBW (%IBW):   ( ) UBW (%UBW):   (  %)    BMI: Body mass index is 36.87 kg/m². This BMI is indicative of:  []Underweight   []Normal   []Overweight   [x] Obesity   [] Extreme Obesity (BMI>40)  Last Weight Metrics:  Weight Loss Metrics 7/5/2020   Today's Wt 195 lb 1.7 oz   BMI 36.87 kg/m2     Estimated Nutrition Needs (Based on): 3286 Kcals/day(BMR (1417) x 1. 3AF) , 89 g(1.0 /kg bw) Protein  Carbohydrate: At Least 130 g/day  Fluids: 1800 mL/day    NUTRITION DIAGNOSES:   Problem:  Altered nutrition-related lab values      Etiology: related to current medical condition     Signs/Symptoms: as evidenced by pre-DM, elevated A1C    Previous Nutrition Dx:  [] Resolved  [] Unresolved           [x] Progressing    NUTRITION INTERVENTIONS:  Meals/Snacks: General/healthful diet         Initial/Brief Nutrition Education: Purpose of nutrition education        GOAL:   Maintain PO intake at least 50% of meals while prevent A1C from increasing beyond 6% next 5-7 days    NUTRITION MONITORING AND EVALUATION   Food/Nutrient Intake Outcomes:  Total energy intake  Physical Signs/Symptoms Outcomes: Weight/weight change, Glucose profile    Previous Goal Met:   [] Met              [x] Progressing Towards Goal              [] Not Progressing Towards Goal   Previous Recommendations:   [] Implemented          [] Not Implemented          [x] Not Applicable    LEARNING NEEDS (Diet, Food/Nutrient-Drug Interaction):    [x] None Identified   [] Identified and Education Provided/Documented   [] Identified and Pt declined/was not appropriate     Cultural, Jainism, OR Ethnic Dietary Needs:    [x] None Identified   [] Identified and Addressed     [x] Interdisciplinary Care Plan Reviewed/Documented    [x] Discharge Planning:  Continue regular diet as tolerated   [] Participated in Interdisciplinary Rounds    NUTRITION RISK:    [x] Patient At Nutritional Risk       [] Patient Not At [address] [place] [address]  Pager [phone]  Weekend Pager [phone]

## 2020-07-07 NOTE — PROGRESS NOTES
Spiritual Care Assessment/Progress Note  Isabelleiraida Roman      NAME: Larry Leyva      MRN: [id number]  AGE: 54 y.o.  SEX: female  Nondenominational Affiliation: Maykel Box   Language: English     7/7/2020     Total Time (in minutes): 40     Spiritual Assessment begun in [address] through conversation with:         []Patient        [] Family    [] Friend(s)        Reason for Consult: Initial/Spiritual assessment, patient floor     Spiritual beliefs: (Please include comment if needed)     [x] Identifies with a nilsa tradition:         [x] Supported by a nilsa community:            [] Claims no spiritual orientation:           [] Seeking spiritual identity:                [] Adheres to an individual form of spirituality:           [] Not able to assess:                           Identified resources for coping:      [x] Prayer                               [] Music                  [] Guided Imagery     [x] Family/friends                 [] Pet visits     [] Devotional reading                         [] Unknown     [] Other:                                                Interventions offered during this visit: (See comments for more details)    Patient Interventions: Affirmation of nilsa, Affirmation of emotions/emotional suffering, Iconic (affirming the presence of God/Higher Power), Prayer (actual), Prayer (assurance of)           Plan of Care:     [] Support spiritual and/or cultural needs    [] Support AMD and/or advance care planning process      [] Support grieving process   [] Coordinate Rites and/or Rituals    [] Coordination with community clergy   [x] No spiritual needs identified at this time   [] Detailed Plan of Care below (See Comments)  [] Make referral to Music Therapy  [] Make referral to Pet Therapy     [] Make referral to Addiction services  [] Make referral to [facility]  [] Make referral to Spiritual Care Partner  [] No future visits requested        [] Follow up visits as needed Comments:  Patient was visited on the [facility] Tele unit to make a spiritual assessment. The patient did not have visitors present. The patient was receptive to the visit. The patient shared the struggle that is having with addiction, including, but not limited to, nicotine. The  patient was encourage to tell her story before prayer. The patient discussed the support that she has through her fiance and their plan to . The patient talked about the many places where she has lived. Her nilsa life is centered around her [employer] in [place], [place] . She gets much spiritual support from her  and her bola. She has spoken with both while admitted. The agreed to have prayer. We prayed and then discussed discharge. Advised of  availability. Chaplains will follow as able and/or needed. Rev.  Ning Paulino EdD MDiv     For  Assistance Page [phone])

## 2020-07-07 NOTE — PROGRESS NOTES
Hospitalist Progress Note    NAME: Nancy Elaine   :  1965   MRN:  [id number]   Room Number:  557/11  @ [address] [facility]       Interim Hospital Summary: 54 y.o. female whom presented on 2020 with      Assessment / Plan:  Acute exacerbation of COPD POA   Likely triggers : ongoing cigarette smoking, lack of maintenance medications. Increased cough, wheezing, dyspnea. Smokes 1 ppd. CXR normal. Normal WBC. Denies exposure to sick contact and mostly stays at home however did move here from [facility] last month and ws homeless until last month.      - [facility], PRN, mucomyst  - Robitussin PRN, Benzonatate TID   - Solumedrol 40 mg IV  Q6H. Will likely need slow prednisone taper at discharge  - Montelukast 10 mg nightly  - Nicotine patch, smoking cessation counselling.         Prediabetes POA         Lab Results   Component Value Date/Time     Hemoglobin A1c 6.0 (H) 2020 04:03 AM      - POC glucose AC HS, insulin lispro sliding scale. Monitor for steroid induced hyperglycemia. - Counseled on Lifestyle modifications, ADA Diet ,weight loss strategies. - RD consult         Body mass index is 36.87 kg/m².   Morbid obesity   Counseled on Lifestyle modifications, AHA Diet ,weight loss strategies.          Tobacco dependence   - nicotine patch   - Patient was counseled extensively on the need to abstain from tobacco, its addictive tendencies, its deleterious effects on the lungs, cardiovascular  as well as its financial & social sequelae         SARS COV-2 ruled out by testing. Hx of homelessness, [place],, respiratory symptoms.  CXR clear.         Hypokalemia POA, resolved        Code Status: full   Surrogate Decision Maker:  Name:     Rel:  Merced Bardales  Daughter Home Ph:  Work Ph:  Mobile Ph: [phone]      Pref Ph.  Mobile phone [3]            DVT Prophylaxis: Lovenox  GI Prophylaxis: not indicated     Baseline: amb independently        Subjective: Chief Complaint / Reason for Physician Visit  \"cant seem to catch a breath\". Discussed with RN events overnight. Review of Systems:  + dyspnea, wheezing, non productive cough. No fevers, chills, appetite change, sputum production,nausea, vomitting, diarrhea, constipation, chest pain, leg edema, abdominal pain, joint pain, rash, itching. Tolerating PT/OT. Tolerating diet. Objective:     VITALS:   Last 24hrs VS reviewed since prior progress note. Most recent are:  Patient Vitals for the past 24 hrs:   Temp Pulse Resp BP SpO2   07/07/20 0813 98.5 °F (36.9 °C) 84 20 (!) 138/93 98 %   07/07/20 0403 98.2 °F (36.8 °C) 78 17 129/72 96 %   07/06/20 2018 98.4 °F (36.9 °C) 81 18 142/78 97 %   07/06/20 1556 97.8 °F (36.6 °C) 96 18 112/86 95 %     No intake or output data in the 24 hours ending 07/07/20 1433     PHYSICAL EXAM:  General: WD, WN. Alert, cooperative, no acute distress    EENT:  EOMI. Anicteric sclerae. MMM  Resp:  Bilateral expiratory wheezing diffusely  CV:  Regular  rhythm,  normal S1/S2, no murmurs rubs gallops, No edema  GI:  Soft, Non distended, Non tender. +Bowel sounds  Neurologic:  Alert and oriented X 3, normal speech,   Psych:   Good insight. Not anxious nor agitated  Skin:  No rashes. No jaundice    Reviewed most current lab test results and cultures  YES  Reviewed most current radiology test results   YES  Review and summation of old records today    NO  Reviewed patient's current orders and MAR    YES  PMH/SH reviewed - no change compared to H&P  ________________________________________________________________________  Care Plan discussed with:    Comments   Patient x    Family      RN x    Care Manager x    Consultant                        Multidiciplinary team rounds were held today with , nursing, pharmacist and clinical coordinator. Patient's plan of care was discussed; medications were reviewed and discharge planning was addressed. ________________________________________________________________________  Total NON critical care TIME:  25  Minutes    Total CRITICAL CARE TIME Spent:   Minutes non procedure based      Comments   >50% of visit spent in counseling and coordination of care     ________________________________________________________________________  Lawanda Tomas MD     Procedures: see electronic medical records for all procedures/Xrays and details which were not copied into this note but were reviewed prior to creation of Plan. LABS:  I reviewed today's most current labs and imaging studies.   Pertinent labs include:  Recent Labs     07/06/20 0403 07/05/20 0717   WBC 11.1* 9.0   HGB 13.4 14.3   HCT 42.0 44.7    278     Recent Labs     07/06/20  0403 07/05/20  1425 07/05/20 0717     --  142   K 4.4 4.7 2.6*     --  105   CO2 28  --  29   *  --  130*   BUN 8  --  9   CREA 0.65  --  0.72   CA 8.9  --  8.5   MG 1.9  --  1.9   PHOS 2.8  --   --    ALB  --   --  3.5   TBILI  --   --  0.7   ALT  --   --  13       Signed: Lawanda Tomas MD

## 2020-07-07 NOTE — INTERDISCIPLINARY ROUNDS
Interdisciplinary team rounds were held 7/7/2020 with the following team members:Care Management, Infection Control, Nursing, Pastoral Care, Pharmacy and Physician and the patient. Plan of care discussed. See clinical pathway and/or care plan for interventions and desired outcomes.

## 2020-07-08 LAB
GLUCOSE BLD STRIP.AUTO-MCNC: 129 MG/DL (ref 65–100)
GLUCOSE BLD STRIP.AUTO-MCNC: 130 MG/DL (ref 65–100)
GLUCOSE BLD STRIP.AUTO-MCNC: 152 MG/DL (ref 65–100)
GLUCOSE BLD STRIP.AUTO-MCNC: 158 MG/DL (ref 65–100)
SERVICE CMNT-IMP: ABNORMAL

## 2020-07-08 PROCEDURE — 74011000250 HC RX REV CODE- 250: Performed by: STUDENT IN AN ORGANIZED HEALTH CARE EDUCATION/TRAINING PROGRAM

## 2020-07-08 PROCEDURE — 74011000250 HC RX REV CODE- 250: Performed by: EMERGENCY MEDICINE

## 2020-07-08 PROCEDURE — 74011636637 HC RX REV CODE- 636/637: Performed by: STUDENT IN AN ORGANIZED HEALTH CARE EDUCATION/TRAINING PROGRAM

## 2020-07-08 PROCEDURE — 94640 AIRWAY INHALATION TREATMENT: CPT

## 2020-07-08 PROCEDURE — 74011250636 HC RX REV CODE- 250/636: Performed by: STUDENT IN AN ORGANIZED HEALTH CARE EDUCATION/TRAINING PROGRAM

## 2020-07-08 PROCEDURE — 74011250637 HC RX REV CODE- 250/637: Performed by: STUDENT IN AN ORGANIZED HEALTH CARE EDUCATION/TRAINING PROGRAM

## 2020-07-08 PROCEDURE — 82962 GLUCOSE BLOOD TEST: CPT

## 2020-07-08 PROCEDURE — 65270000029 HC RM PRIVATE

## 2020-07-08 PROCEDURE — 74011250637 HC RX REV CODE- 250/637: Performed by: HOSPITALIST

## 2020-07-08 RX ADMIN — ENOXAPARIN SODIUM 40 MG: 40 INJECTION SUBCUTANEOUS at 11:47

## 2020-07-08 RX ADMIN — MONTELUKAST SODIUM 10 MG: 10 TABLET, FILM COATED ORAL at 21:07

## 2020-07-08 RX ADMIN — IPRATROPIUM BROMIDE AND ALBUTEROL SULFATE 3 ML: .5; 3 SOLUTION RESPIRATORY (INHALATION) at 07:49

## 2020-07-08 RX ADMIN — ACETYLCYSTEINE 200 MG: 200 SOLUTION ORAL; RESPIRATORY (INHALATION) at 21:00

## 2020-07-08 RX ADMIN — Medication 10 ML: at 21:07

## 2020-07-08 RX ADMIN — INSULIN LISPRO 2 UNITS: 100 INJECTION, SOLUTION INTRAVENOUS; SUBCUTANEOUS at 17:53

## 2020-07-08 RX ADMIN — IPRATROPIUM BROMIDE AND ALBUTEROL SULFATE 3 ML: .5; 3 SOLUTION RESPIRATORY (INHALATION) at 21:01

## 2020-07-08 RX ADMIN — METHYLPREDNISOLONE SODIUM SUCCINATE 40 MG: 40 INJECTION, POWDER, FOR SOLUTION INTRAMUSCULAR; INTRAVENOUS at 17:53

## 2020-07-08 RX ADMIN — METHYLPREDNISOLONE SODIUM SUCCINATE 40 MG: 40 INJECTION, POWDER, FOR SOLUTION INTRAMUSCULAR; INTRAVENOUS at 11:47

## 2020-07-08 RX ADMIN — GUAIFENESIN AND DEXTROMETHORPHAN 5 ML: 100; 10 SYRUP ORAL at 11:48

## 2020-07-08 RX ADMIN — IPRATROPIUM BROMIDE AND ALBUTEROL SULFATE 3 ML: .5; 3 SOLUTION RESPIRATORY (INHALATION) at 12:47

## 2020-07-08 RX ADMIN — IPRATROPIUM BROMIDE AND ALBUTEROL SULFATE 3 ML: .5; 3 SOLUTION RESPIRATORY (INHALATION) at 16:52

## 2020-07-08 RX ADMIN — BENZONATATE 200 MG: 100 CAPSULE ORAL at 17:00

## 2020-07-08 RX ADMIN — Medication 5 ML: at 14:00

## 2020-07-08 RX ADMIN — METHYLPREDNISOLONE SODIUM SUCCINATE 40 MG: 40 INJECTION, POWDER, FOR SOLUTION INTRAMUSCULAR; INTRAVENOUS at 00:13

## 2020-07-08 RX ADMIN — IPRATROPIUM BROMIDE AND ALBUTEROL SULFATE 3 ML: .5; 3 SOLUTION RESPIRATORY (INHALATION) at 04:15

## 2020-07-08 RX ADMIN — GUAIFENESIN AND DEXTROMETHORPHAN 5 ML: 100; 10 SYRUP ORAL at 06:15

## 2020-07-08 RX ADMIN — ACETYLCYSTEINE 200 MG: 200 SOLUTION ORAL; RESPIRATORY (INHALATION) at 07:48

## 2020-07-08 RX ADMIN — BENZONATATE 200 MG: 100 CAPSULE ORAL at 08:36

## 2020-07-08 RX ADMIN — INSULIN LISPRO 2 UNITS: 100 INJECTION, SOLUTION INTRAVENOUS; SUBCUTANEOUS at 22:16

## 2020-07-08 RX ADMIN — ACETYLCYSTEINE 200 MG: 200 SOLUTION ORAL; RESPIRATORY (INHALATION) at 16:56

## 2020-07-08 RX ADMIN — METHYLPREDNISOLONE SODIUM SUCCINATE 40 MG: 40 INJECTION, POWDER, FOR SOLUTION INTRAMUSCULAR; INTRAVENOUS at 06:15

## 2020-07-08 RX ADMIN — Medication 10 ML: at 06:15

## 2020-07-08 RX ADMIN — IPRATROPIUM BROMIDE AND ALBUTEROL SULFATE 3 ML: .5; 3 SOLUTION RESPIRATORY (INHALATION) at 00:15

## 2020-07-08 RX ADMIN — BENZONATATE 200 MG: 100 CAPSULE ORAL at 21:07

## 2020-07-08 NOTE — PROGRESS NOTES
0000: Resting quietly in bed. Occasional cough, no signs or symptoms of discomfort    0200: No changes from 0000 note    0400: Resting quietly, denies any complaints of pain or discomfort. 0600: Responds to voice, no changes for 0400.

## 2020-07-08 NOTE — PROGRESS NOTES
2000: Awake, alert and oriented. Sitting in bed watching TV, no complaints of pain or discomfort at this time. See flow sheet for additional assessment. 2200: Been talking on phone this evening but has now settled down and is resting and watching tv at this time. Will continue to monitor.

## 2020-07-08 NOTE — PROGRESS NOTES
Nutrition Assessment:    INTERVENTIONS/RECOMMENDATIONS:   Meals/Snacks: General/healthful diet:  Adding CCD for BG management. Initial/Brief Nutrition Education: Purpose of nutrition education:  Follow-up regarding diet education and lifestyle modification. ASSESSMENT:   7/8:  Chart reviewed; med noted for  COPD with exacerbation. Also, noted to have pre-DM with A1C 6.0%. Hx of obesity. Diet Order: Regular  % Eaten:    Patient Vitals for the past 72 hrs:   % Diet Eaten   07/08/20 0800 100 %     Pertinent Medications: [x] Reviewed []Other:  Pertinent Labs: [x]Reviewed  []Other: A1C 6.0%  Food Allergies: [x]None []Other:     Last BM: 7/6   [x]Active     []Hyperactive  []Hypoactive       [] Absent  BS  Skin:    [x] Intact   [] Incision  [] Breakdown   []Edema   []Other:    Anthropometrics: Height: 5' 1\" (154.9 cm) Weight: 88.5 kg (195 lb 1.7 oz)    IBW (%IBW):   ( ) UBW (%UBW):   (  %)    BMI: Body mass index is 36.87 kg/m². This BMI is indicative of:  []Underweight   []Normal   [x]Overweight   [] Obesity   [] Extreme Obesity (BMI>40)  Last Weight Metrics:  Weight Loss Metrics 7/5/2020   Today's Wt 195 lb 1.7 oz   BMI 36.87 kg/m2       Estimated Nutrition Needs (Based on): 7982 Kcals/day(BMR (1417) x 1. 3AF) , 89 g(1.0 /kg bw) Protein  Carbohydrate: At Least 130 g/day  Fluids: 1800 mL/day    NUTRITION DIAGNOSES:   Problem:  Altered nutrition-related lab values      Etiology: related to current medical condition     Signs/Symptoms: as evidenced by pre-DM, elevated A1C    Previous Nutrition Dx:  [] Resolved  [] Unresolved           [x] Progressing    NUTRITION INTERVENTIONS:  Meals/Snacks: General/healthful diet         Initial/Brief Nutrition Education: Purpose of nutrition education        GOAL:   Maintain PO intake at least 50% of meals while prevent A1C from increasing beyond 6% next 5-7 days    NUTRITION MONITORING AND EVALUATION   Food/Nutrient Intake Outcomes:  Total energy intake  Physical Signs/Symptoms Outcomes: Weight/weight change, Glucose profile    Previous Goal Met:   [] Met              [x] Progressing Towards Goal              [] Not Progressing Towards Goal   Previous Recommendations:   [] Implemented          [] Not Implemented          [x] Not Applicable    LEARNING NEEDS (Diet, Food/Nutrient-Drug Interaction):    [x] None Identified   [] Identified and Education Provided/Documented   [] Identified and Pt declined/was not appropriate     Cultural, Roman Catholic, OR Ethnic Dietary Needs:    [x] None Identified   [] Identified and Addressed     [x] Interdisciplinary Care Plan Reviewed/Documented    [x] Discharge Planning:  Continue CCD for BG management.      [] Participated in Interdisciplinary Rounds    NUTRITION RISK:    [x] Patient At Nutritional Risk       [] Patient Not At [address] [place] [address]  Pager [phone]  Weekend Pager [phone]

## 2020-07-08 NOTE — PROGRESS NOTES
Hospitalist Progress Note    NAME: Mary Sims   :  1965   MRN:  [id number]   Room Number:  360/34  @ [facility]       Interim Hospital Summary: 54 y.o. female whom presented on 2020 with      Assessment / Plan:  Acute exacerbation of COPD POA, still with severe cough and dyspnea  Likely triggers : ongoing cigarette smoking, lack of maintenance medications. Increased cough, wheezing, dyspnea. Smokes 1 ppd. CXR normal. Normal WBC. Denies exposure to sick contact and mostly stays at home however did move here from [place] last month and ws homeless until last month.      - [facility] [address], PRN, mucomyst  - Robitussin PRN, Benzonatate TID   -Continue Solumedrol 40 mg IV  . Will likely need slow prednisone taper at discharge  - Montelukast 10 mg nightly  - Nicotine patch, smoking cessation counselling.         Prediabetes POA         Lab Results   Component Value Date/Time     Hemoglobin A1c 6.0 (H) 2020 04:03 AM      - POC glucose AC HS, insulin lispro sliding scale. Monitor for steroid induced hyperglycemia. - Counseled on Lifestyle modifications, ADA Diet ,weight loss strategies. - RD consult         Body mass index is 36.87 kg/m².   Morbid obesity   Counseled on Lifestyle modifications, AHA Diet ,weight loss strategies.          Tobacco dependence   - nicotine patch   - Patient was counseled extensively on the need to abstain from tobacco, its addictive tendencies, its deleterious effects on the lungs, cardiovascular  as well as its financial & social sequelae         SARS COV-2 ruled out by testing. Hx of homelessness, moved from out of [facility],, respiratory symptoms.  CXR clear.         Hypokalemia POA, resolved        Code Status: full   Surrogate Decision Maker:  Name:     Rel:  Rikki Schmidt  Daughter Home Ph:  Work Ph:  Mobile Ph: [phone]      Pref Ph.  Mobile phone [3]            DVT Prophylaxis: Lovenox  GI Prophylaxis: not indicated     Baseline: amb independently        Subjective:     Chief Complaint / Reason for Physician Visit  \"Severe cough and shortness of breath\". Discussed with RN events overnight. Review of Systems:  + dyspnea, wheezing, non productive cough. No fevers, chills, appetite change, sputum production,nausea, vomitting, diarrhea, constipation, chest pain, leg edema, abdominal pain, joint pain, rash, itching. Tolerating PT/OT. Tolerating diet. Objective:     VITALS:   Last 24hrs VS reviewed since prior progress note. Most recent are:  Patient Vitals for the past 24 hrs:   Temp Pulse Resp BP SpO2   07/08/20 0842  97  137/83    07/08/20 0800 97.5 °F (36.4 °C) 99 16 (!) 152/99 98 %   07/08/20 0400 98.4 °F (36.9 °C) 85 17 138/77 97 %   07/07/20 2000 97.7 °F (36.5 °C) 81 18 115/81 100 %   07/07/20 1600 97.8 °F (36.6 °C) 92 18 139/89 97 %       Intake/Output Summary (Last 24 hours) at 7/8/2020 1116  Last data filed at 7/8/2020 0800  Gross per 24 hour   Intake 240 ml   Output    Net 240 ml        PHYSICAL EXAM:  General: WD, WN. Alert, cooperative, no acute distress    EENT:  EOMI. Anicteric sclerae. MMM  Resp:  Bilateral expiratory wheezing diffusely  CV:  Regular  rhythm,  normal S1/S2, no murmurs rubs gallops, No edema  GI:  Soft, Non distended, Non tender. +Bowel sounds  Neurologic:  Alert and oriented X 3, normal speech,   Psych:   Good insight. Not anxious nor agitated  Skin:  No rashes.   No jaundice    Reviewed most current lab test results and cultures  YES  Reviewed most current radiology test results   YES  Review and summation of old records today    NO  Reviewed patient's current orders and MAR    YES  PMH/SH reviewed - no change compared to H&P  ________________________________________________________________________  Care Plan discussed with:    Comments   Patient x    Family      RN x    Care Manager x    Consultant                        Multidiciplinary team rounds were held today with , nursing, pharmacist and clinical coordinator. Patient's plan of care was discussed; medications were reviewed and discharge planning was addressed. ________________________________________________________________________  Total NON critical care TIME:  25  Minutes    Total CRITICAL CARE TIME Spent:   Minutes non procedure based      Comments   >50% of visit spent in counseling and coordination of care     ________________________________________________________________________  Sabrina Velez MD     Procedures: see electronic medical records for all procedures/Xrays and details which were not copied into this note but were reviewed prior to creation of Plan. LABS:  I reviewed today's most current labs and imaging studies.   Pertinent labs include:  Recent Labs     07/06/20 0403   WBC 11.1*   HGB 13.4   HCT 42.0        Recent Labs     07/06/20 0403 07/05/20  1425     --    K 4.4 4.7     --    CO2 28  --    *  --    BUN 8  --    CREA 0.65  --    CA 8.9  --    MG 1.9  --    PHOS 2.8  --        Signed: Sabrina Velez MD

## 2020-07-08 NOTE — PROGRESS NOTES
Lucio Toro [facility] BEDSIDE_VERBALshift change report given to Leroy Moyer RN (oncoming nurse) by Wendy Newell RN (offgoing nurse). Report included the following information SBAR, Kardex, Intake/Output, MAR, Accordion and Med Rec Status.

## 2020-07-08 NOTE — PROGRESS NOTES
0800- Patient assessed, says last BM was about 3 days ago, no pain issues, non-productive cough and coarse lung sounds. 1000- Rounds completed on patient, steroid will be titrated down, possible D/C in AM, case management will work on discharge and MD will work on getting medications ordered and send to pharmacy. 1200- patient on phone, no needs at this time. 1400- Patient on phone, no needs at this time.

## 2020-07-08 NOTE — PROGRESS NOTES
Problem: Falls - Risk of  Goal: *Absence of Falls  Description: Document Prakash Vasquez Fall Risk and appropriate interventions in the flowsheet.   Outcome: Progressing Towards Goal  Note: Fall Risk Interventions:            Medication Interventions: Patient to call before getting OOB         History of Falls Interventions: Door open when patient unattended, Room close to nurse's station         Problem: Patient Education: Go to Patient Education Activity  Goal: Patient/Family Education  Outcome: Progressing Towards Goal     Problem: Chronic Obstructive Pulmonary Disease (COPD)  Goal: *Oxygen saturation during activity within specified parameters  Outcome: Progressing Towards Goal  Goal: *Able to remain out of bed as prescribed  Outcome: Progressing Towards Goal  Goal: *Absence of hypoxia  Outcome: Progressing Towards Goal  Goal: *Optimize nutritional status  Outcome: Progressing Towards Goal

## 2020-07-09 LAB
GLUCOSE BLD STRIP.AUTO-MCNC: 114 MG/DL (ref 65–100)
GLUCOSE BLD STRIP.AUTO-MCNC: 126 MG/DL (ref 65–100)
GLUCOSE BLD STRIP.AUTO-MCNC: 143 MG/DL (ref 65–100)
GLUCOSE BLD STRIP.AUTO-MCNC: 155 MG/DL (ref 65–100)
SERVICE CMNT-IMP: ABNORMAL

## 2020-07-09 PROCEDURE — 74011000250 HC RX REV CODE- 250: Performed by: EMERGENCY MEDICINE

## 2020-07-09 PROCEDURE — 74011250637 HC RX REV CODE- 250/637: Performed by: FAMILY MEDICINE

## 2020-07-09 PROCEDURE — 94640 AIRWAY INHALATION TREATMENT: CPT

## 2020-07-09 PROCEDURE — 74011636637 HC RX REV CODE- 636/637: Performed by: STUDENT IN AN ORGANIZED HEALTH CARE EDUCATION/TRAINING PROGRAM

## 2020-07-09 PROCEDURE — 82962 GLUCOSE BLOOD TEST: CPT

## 2020-07-09 PROCEDURE — 74011250636 HC RX REV CODE- 250/636: Performed by: STUDENT IN AN ORGANIZED HEALTH CARE EDUCATION/TRAINING PROGRAM

## 2020-07-09 PROCEDURE — 74011250636 HC RX REV CODE- 250/636: Performed by: FAMILY MEDICINE

## 2020-07-09 PROCEDURE — 74011250637 HC RX REV CODE- 250/637: Performed by: STUDENT IN AN ORGANIZED HEALTH CARE EDUCATION/TRAINING PROGRAM

## 2020-07-09 PROCEDURE — 65270000029 HC RM PRIVATE

## 2020-07-09 PROCEDURE — 74011000250 HC RX REV CODE- 250: Performed by: STUDENT IN AN ORGANIZED HEALTH CARE EDUCATION/TRAINING PROGRAM

## 2020-07-09 RX ORDER — BENZONATATE 200 MG/1
200 CAPSULE ORAL 3 TIMES DAILY
Qty: 21 CAP | Refills: 0 | Status: SHIPPED | OUTPATIENT
Start: 2020-07-09 | End: 2020-07-16

## 2020-07-09 RX ORDER — BISACODYL 5 MG
5 TABLET, DELAYED RELEASE (ENTERIC COATED) ORAL DAILY PRN
Status: DISCONTINUED | OUTPATIENT
Start: 2020-07-09 | End: 2020-07-10 | Stop reason: HOSPADM

## 2020-07-09 RX ORDER — PREDNISONE 20 MG/1
TABLET ORAL
Qty: 15 TAB | Refills: 0 | Status: SHIPPED | OUTPATIENT
Start: 2020-07-09 | End: 2020-07-16

## 2020-07-09 RX ORDER — IBUPROFEN 200 MG
1 TABLET ORAL DAILY
Qty: 30 PATCH | Refills: 0 | Status: SHIPPED | OUTPATIENT
Start: 2020-07-10 | End: 2020-08-09

## 2020-07-09 RX ORDER — MORPHINE SULFATE 2 MG/ML
2 INJECTION, SOLUTION INTRAMUSCULAR; INTRAVENOUS
Status: DISCONTINUED | OUTPATIENT
Start: 2020-07-09 | End: 2020-07-10

## 2020-07-09 RX ORDER — BUDESONIDE AND FORMOTEROL FUMARATE DIHYDRATE 80; 4.5 UG/1; UG/1
2 AEROSOL RESPIRATORY (INHALATION) 2 TIMES DAILY
Qty: 1 INHALER | Refills: 1 | Status: ON HOLD | OUTPATIENT
Start: 2020-07-09 | End: 2022-04-20 | Stop reason: SDUPTHER

## 2020-07-09 RX ORDER — GUAIFENESIN/DEXTROMETHORPHAN 100-10MG/5
5 SYRUP ORAL
Qty: 1 BOTTLE | Refills: 0 | Status: SHIPPED | OUTPATIENT
Start: 2020-07-09 | End: 2020-07-19

## 2020-07-09 RX ORDER — DOXYCYCLINE 100 MG/1
100 TABLET ORAL 2 TIMES DAILY
Qty: 20 TAB | Refills: 0 | Status: SHIPPED | OUTPATIENT
Start: 2020-07-09 | End: 2020-07-19

## 2020-07-09 RX ADMIN — METHYLPREDNISOLONE SODIUM SUCCINATE 40 MG: 40 INJECTION, POWDER, FOR SOLUTION INTRAMUSCULAR; INTRAVENOUS at 18:19

## 2020-07-09 RX ADMIN — IPRATROPIUM BROMIDE AND ALBUTEROL SULFATE 3 ML: .5; 3 SOLUTION RESPIRATORY (INHALATION) at 07:26

## 2020-07-09 RX ADMIN — IPRATROPIUM BROMIDE AND ALBUTEROL SULFATE 3 ML: .5; 3 SOLUTION RESPIRATORY (INHALATION) at 20:31

## 2020-07-09 RX ADMIN — IPRATROPIUM BROMIDE AND ALBUTEROL SULFATE 3 ML: .5; 3 SOLUTION RESPIRATORY (INHALATION) at 00:19

## 2020-07-09 RX ADMIN — MONTELUKAST SODIUM 10 MG: 10 TABLET, FILM COATED ORAL at 22:00

## 2020-07-09 RX ADMIN — ACETYLCYSTEINE 200 MG: 200 SOLUTION ORAL; RESPIRATORY (INHALATION) at 07:26

## 2020-07-09 RX ADMIN — METHYLPREDNISOLONE SODIUM SUCCINATE 40 MG: 40 INJECTION, POWDER, FOR SOLUTION INTRAMUSCULAR; INTRAVENOUS at 06:51

## 2020-07-09 RX ADMIN — MORPHINE SULFATE 2 MG: 2 INJECTION, SOLUTION INTRAMUSCULAR; INTRAVENOUS at 20:09

## 2020-07-09 RX ADMIN — METHYLPREDNISOLONE SODIUM SUCCINATE 40 MG: 40 INJECTION, POWDER, FOR SOLUTION INTRAMUSCULAR; INTRAVENOUS at 12:40

## 2020-07-09 RX ADMIN — MORPHINE SULFATE 2 MG: 2 INJECTION, SOLUTION INTRAMUSCULAR; INTRAVENOUS at 23:56

## 2020-07-09 RX ADMIN — IPRATROPIUM BROMIDE AND ALBUTEROL SULFATE 3 ML: .5; 3 SOLUTION RESPIRATORY (INHALATION) at 12:05

## 2020-07-09 RX ADMIN — Medication 10 ML: at 20:10

## 2020-07-09 RX ADMIN — METHYLPREDNISOLONE SODIUM SUCCINATE 40 MG: 40 INJECTION, POWDER, FOR SOLUTION INTRAMUSCULAR; INTRAVENOUS at 02:35

## 2020-07-09 RX ADMIN — Medication 10 ML: at 12:43

## 2020-07-09 RX ADMIN — MORPHINE SULFATE 2 MG: 2 INJECTION, SOLUTION INTRAMUSCULAR; INTRAVENOUS at 16:01

## 2020-07-09 RX ADMIN — METHYLPREDNISOLONE SODIUM SUCCINATE 40 MG: 40 INJECTION, POWDER, FOR SOLUTION INTRAMUSCULAR; INTRAVENOUS at 23:56

## 2020-07-09 RX ADMIN — BISACODYL 5 MG: 5 TABLET, COATED ORAL at 12:40

## 2020-07-09 RX ADMIN — Medication 10 ML: at 23:57

## 2020-07-09 RX ADMIN — BENZONATATE 200 MG: 100 CAPSULE ORAL at 16:01

## 2020-07-09 RX ADMIN — BENZONATATE 200 MG: 100 CAPSULE ORAL at 22:00

## 2020-07-09 RX ADMIN — ACETYLCYSTEINE 200 MG: 200 SOLUTION ORAL; RESPIRATORY (INHALATION) at 15:36

## 2020-07-09 RX ADMIN — Medication 10 ML: at 22:00

## 2020-07-09 RX ADMIN — ENOXAPARIN SODIUM 40 MG: 40 INJECTION SUBCUTANEOUS at 12:40

## 2020-07-09 RX ADMIN — Medication 10 ML: at 06:50

## 2020-07-09 RX ADMIN — IPRATROPIUM BROMIDE AND ALBUTEROL SULFATE 3 ML: .5; 3 SOLUTION RESPIRATORY (INHALATION) at 04:16

## 2020-07-09 RX ADMIN — ACETYLCYSTEINE 200 MG: 200 SOLUTION ORAL; RESPIRATORY (INHALATION) at 20:32

## 2020-07-09 RX ADMIN — INSULIN LISPRO 2 UNITS: 100 INJECTION, SOLUTION INTRAVENOUS; SUBCUTANEOUS at 17:44

## 2020-07-09 RX ADMIN — BENZONATATE 200 MG: 100 CAPSULE ORAL at 08:38

## 2020-07-09 RX ADMIN — IPRATROPIUM BROMIDE AND ALBUTEROL SULFATE 3 ML: .5; 3 SOLUTION RESPIRATORY (INHALATION) at 15:35

## 2020-07-09 NOTE — DISCHARGE SUMMARY
Hospitalist Discharge Summary     Patient ID:  Veto Patel  [id number]  28 y.o.  1965 7/5/2020    PCP on record: None    Admit date: 7/5/2020  Discharge date and time: 7/9/2020    DISCHARGE DIAGNOSIS:  COPD exacerbation, abdominal pain    CONSULTATIONS:  None    Excerpted HPI from H&P of David Galvez MD:  HISTORY OF PRESENT ILLNESS:     Veto Patel is a 54 y.o.  female with PMH of cigarette smoking, COPD who presents to ED with c/o shortness of breath, wheezing.      Patient reports worsening dyspnea, initially in addition hours, wheezing over the past 3 to 4 days prompting visitation to the ER. She smokes 1 pack of cigarettes daily. She has a history of COPD and used to be on Symbicort, add with her, albuterol however has not been on any medication since May as she ran out of them after she moved to [place] with [facility].      Denies fever, chills, exposure to sick contacts. Moved to [place] in the beginning of June.        Smokes 1 ppd  Occasional alcohol use  No illicit drug use     Lives with gary  Was homeless until last month, lived in [place]. Now moved to be with [facility] in [place]. Unemployed  Ambulatory independently        We were asked to admit for work up and evaluation of the above problems.        ______________________________________________________________________  DISCHARGE SUMMARY/HOSPITAL COURSE:  for full details see H&P, daily progress notes, labs, consult notes. Acute exacerbation of COPD POA, still with severe cough and dyspnea  Likely triggers : ongoing cigarette smoking, lack of maintenance medications. Increased cough, wheezing, dyspnea. Smokes 1 ppd. CXR normal. Normal WBC. Denies exposure to sick contact and mostly stays at home however did move here from [place] last month and ws homeless until last month.      - [facility], PRN, mucomyst  - Robitussin PRN, Benzonatate TID   -Continue Solumedrol 40 mg IV  .  Will likely need slow prednisone taper at discharge  - Montelukast 10 mg nightly  - Nicotine patch, smoking cessation counselling. COPD exacerbation resolved: Discharge with steroid and inhalers. Advised stop smoking. Severe sudden upper and right upper quadrant epigastric pain: Tender the right upper quadrant, ultrasound ordered closely monitor  Prediabetes POA, ultrasound negative, pain improved, not resolved completely. Advised patient emergency room if pain not resolve or worsen.            Lab Results   Component Value Date/Time     Hemoglobin A1c 6.0 (H) 07/06/2020 04:03 AM      - POC glucose AC HS, insulin lispro sliding scale. Monitor for steroid induced hyperglycemia.   - Counseled on Lifestyle modifications, ADA Diet ,weight loss strategies. - RD consult         Body mass index is 36.87 kg/m².   Morbid obesity   Counseled on Lifestyle modifications, AHA Diet ,weight loss strategies.          Tobacco dependence   - nicotine patch   - Patient was counseled extensively on the need to abstain from tobacco, its addictive tendencies, its deleterious effects on the lungs, cardiovascular  as well as its financial & social sequelae         SARS COV-2 ruled out by testing.   Hx of homelessness, [place] from [place] of [place],, respiratory symptoms. CXR clear.         Hypokalemia POA, resolved        Code Status: full   Surrogate Decision Maker:  Name:     Rel:  Leobardo Das  Daughter Home Ph:  Work Ph:  Mobile Ph: [phone]      Pref Ph.  Mobile phone [3]            DVT Prophylaxis: Lovenox  GI Prophylaxis: not indicated     Baseline: amb independently             _______________________________________________________________________  Patient seen and examined by me on discharge day. Pertinent Findings:  Gen:    Not in distress  Chest: Clear lungs  CVS:   Regular rhythm.   No edema  Abd:  Soft, not distended, not tender  Neuro:  Alert, oriented x3  _______________________________________________________________________  DISCHARGE MEDICATIONS:   Current Discharge Medication List      START taking these medications    Details   benzonatate (TESSALON) 200 mg capsule Take 1 Cap by mouth three (3) times daily for 7 days. Qty: 21 Cap, Refills: 0      guaiFENesin-dextromethorphan (ROBITUSSIN DM) 100-10 mg/5 mL syrup Take 5 mL by mouth every six (6) hours as needed for Cough or Congestion for up to 10 days. Qty: 1 Bottle, Refills: 0      nicotine (NICODERM CQ) 21 mg/24 hr 1 Patch by TransDERmal route daily for 30 days. Qty: 30 Patch, Refills: 0      doxycycline (ADOXA) 100 mg tablet Take 1 Tab by mouth two (2) times a day for 10 days. Qty: 20 Tab, Refills: 0      predniSONE (DELTASONE) 20 mg tablet Take 60 mg by mouth daily (with breakfast) for 2 days, THEN 40 mg daily (with breakfast) for 2 days, THEN 20 mg every other day for 3 days. Qty: 15 Tab, Refills: 0      albuterol sulfate (PROAIR RESPICLICK) 90 mcg/actuation breath activated inhaler Take 2 Puffs by inhalation every six (6) hours as needed for Wheezing. Qty: 1 Inhaler, Refills: 1      budesonide-formoteroL (SYMBICORT) 80-4.5 mcg/actuation HFAA Take 2 Puffs by inhalation two (2) times a day. Qty: 1 Inhaler, Refills: 1               Patient Follow Up Instructions: Activity: Activity as tolerated  Diet: Regular Diet  Wound Care: None needed    Follow-up with PCP shortly after discharge, return emergency room if abdominal pain worsen  Follow-up tests/labs no lab pending  Follow-up Information     Follow up With Specialties Details Why Contact Ayan Suarez NP Nurse Practitioner On 7/21/2020 Your appointment time is 8am.  This will be an in person appointment. , Please arrive 15 minutes early. , Please keep this appointment, Bring ins card, picture id, and discharge papers [place]  [address] [address]  [phone] ________________________________________________________________    Risk of deterioration: Moderate    Condition at Discharge:  Stable  __________________________________________________________________    Disposition  Home with family, no needs    ____________________________________________________________________    Code Status: Full Code  ___________________________________________________________________      Total time in minutes spent coordinating this discharge (includes going over instructions, follow-up, prescriptions, and preparing report for sign off to her PCP) : 35 minutes    Signed:  Carmelo Duncan MD

## 2020-07-09 NOTE — PROGRESS NOTES
IDT met to discuss plan of care. Patient is ready for discharge after 6 minute walk. CM discussed with MD calling medications down to pharmacy and CM to provide a voucher. MD stated he would be able to call medications after patient has the 6 minute walk. 1500 Discussed with nursing. Patient refused to participate in 6 minute walk due to not feeling well. Discharge has been postponed for today. Discharge planning ongoing.     RITESH Lainez/OLIVER  [phone]

## 2020-07-09 NOTE — PROGRESS NOTES
Problem: Chronic Obstructive Pulmonary Disease (COPD)  Goal: *Absence of hypoxia  Outcome: Progressing Towards Goal     Problem: Chronic Obstructive Pulmonary Disease (COPD)  Goal: *Optimize nutritional status  Outcome: Progressing Towards Goal     Problem: Patient Education: Go to Patient Education Activity  Goal: Patient/Family Education  Outcome: Progressing Towards Goal

## 2020-07-09 NOTE — PROGRESS NOTES
Hospitalist Progress Note    NAME: Simran Plunkett   :  1965   MRN:  [id number]   Room Number:  765/63  @ [facility]       Interim Hospital Summary: 54 y.o. female whom presented on 2020 with      Assessment / Plan:  Acute exacerbation of COPD POA, still with severe cough and dyspnea  Likely triggers : ongoing cigarette smoking, lack of maintenance medications. Increased cough, wheezing, dyspnea. Smokes 1 ppd. CXR normal. Normal WBC. Denies exposure to sick contact and mostly stays at home however did move here from [place] last month and ws homeless until last month.      - [address], PRN, mucomyst  - Robitussin PRN, Benzonatate TID   -Continue Solumedrol 40 mg IV  . Will likely need slow prednisone taper at discharge  - Montelukast 10 mg nightly  - Nicotine patch, smoking cessation counselling.      Severe sudden upper and right upper quadrant epigastric pain: Tender the right upper quadrant, ultrasound ordered closely monitor  Prediabetes POA         Lab Results   Component Value Date/Time     Hemoglobin A1c 6.0 (H) 2020 04:03 AM      - POC glucose AC HS, insulin lispro sliding scale. Monitor for steroid induced hyperglycemia. - Counseled on Lifestyle modifications, ADA Diet ,weight loss strategies. - RD consult         Body mass index is 36.87 kg/m².   Morbid obesity   Counseled on Lifestyle modifications, AHA Diet ,weight loss strategies.          Tobacco dependence   - nicotine patch   - Patient was counseled extensively on the need to abstain from tobacco, its addictive tendencies, its deleterious effects on the lungs, cardiovascular  as well as its financial & social sequelae         SARS COV-2 ruled out by testing. Hx of homelessness, moved from out of [facility],, respiratory symptoms.  CXR clear.         Hypokalemia POA, resolved        Code Status: full   Surrogate Decision Maker:  Name:     Rel:  Nba Cordero  Daughter Home Ph:  Work Ph:  Mobile Ph: [phone]      [facility].  Mobile phone [3]            DVT Prophylaxis: Lovenox  GI Prophylaxis: not indicated     Baseline: amb independently        Subjective:     Chief Complaint / Reason for Physician Visit  \"Severe sudden upper abdominal pain\". Discussed with RN events overnight. Review of Systems:  + dyspnea, wheezing, non productive cough. No fevers, chills, appetite change, sputum production,nausea, vomitting, diarrhea, constipation, chest pain, leg edema, abdominal pain, joint pain, rash, itching. Tolerating PT/OT. Tolerating diet. Objective:     VITALS:   Last 24hrs VS reviewed since prior progress note. Most recent are:  Patient Vitals for the past 24 hrs:   Temp Pulse Resp BP SpO2   07/09/20 1206     94 %   07/09/20 0823    136/90    07/09/20 0820 98.1 °F (36.7 °C) 96 18 (!) 131/102 98 %   07/09/20 0727     98 %   07/09/20 0433     98 %   07/08/20 2151     98 %   07/08/20 2017 98.2 °F (36.8 °C) 94 20 (!) 149/103 94 %   07/08/20 1528 98.7 °F (37.1 °C) 99 16 140/89 99 %   07/08/20 1500 98.7 °F (37.1 °C) 99 18 151/90 99 %     No intake or output data in the 24 hours ending 07/09/20 1223     PHYSICAL EXAM:  General: WD, WN. Alert, cooperative, no acute distress    EENT:  EOMI. Anicteric sclerae. MMM  Resp:  Bilateral expiratory wheezing diffusely  CV:  Regular  rhythm,  normal S1/S2, no murmurs rubs gallops, No edema  GI:  Soft, Non distended, right upper quadrant tenderness +Bowel sounds  Neurologic:  Alert and oriented X 3, normal speech,   Psych:   Good insight. Not anxious nor agitated  Skin:  No rashes.   No jaundice    Reviewed most current lab test results and cultures  YES  Reviewed most current radiology test results   YES  Review and summation of old records today    NO  Reviewed patient's current orders and MAR    YES  PMH/SH reviewed - no change compared to H&P  ________________________________________________________________________  Care Plan discussed with:    Comments   Patient x    Family      RN x    Care Manager x    Consultant                        Multidiciplinary team rounds were held today with , nursing, pharmacist and clinical coordinator. Patient's plan of care was discussed; medications were reviewed and discharge planning was addressed. ________________________________________________________________________  Total NON critical care TIME:  25  Minutes    Total CRITICAL CARE TIME Spent:   Minutes non procedure based      Comments   >50% of visit spent in counseling and coordination of care     ________________________________________________________________________  Ye Sparrow MD     Procedures: see electronic medical records for all procedures/Xrays and details which were not copied into this note but were reviewed prior to creation of Plan. LABS:  I reviewed today's most current labs and imaging studies. Pertinent labs include:  No results for input(s): WBC, HGB, HCT, PLT, HGBEXT, HCTEXT, PLTEXT, HGBEXT, HCTEXT, PLTEXT in the last 72 hours. No results for input(s): NA, K, CL, CO2, GLU, BUN, CREA, CA, MG, PHOS, ALB, TBIL, TBILI, ALT, INR, INREXT, INREXT in the last 72 hours.     No lab exists for component: SGOT    Signed: Ye Sparrow MD

## 2020-07-09 NOTE — BH NOTES
Received pt at [address] [facility] on 7/7/20. Pt was lying in bed w/ HOB elevated 30 degrees, talking on phone w/ fiance. A&O x4. Assessment completed. Lungs, upper and lower lobes, bilaterally, w/ course inspiratory and expiratory sounds. Pt c/o slight SOB currently at rest. Develops dyspnea w/ exertion. Nebulizer Tx being completed by RT q4h. Pt complied w/ scheduled HS PO meds. Denies any pain anywhere and voices no further medical c/o.     2216 - Blood sugar 158 - administered 2 units Humalog to RA. Pt was given IV Solumedrol as ordered q4h.    0759 - Bedside and Verbal shift change report given to BALTA Pineda. Report included the following information: SBAR, Kardex, ED Summary, Procedure Summary, Intake/Output, MAR, Accordion and Recent Results.

## 2020-07-10 ENCOUNTER — APPOINTMENT (OUTPATIENT)
Dept: ULTRASOUND IMAGING | Age: 55
DRG: 192 | End: 2020-07-10
Attending: FAMILY MEDICINE

## 2020-07-10 VITALS
SYSTOLIC BLOOD PRESSURE: 137 MMHG | TEMPERATURE: 98.5 F | DIASTOLIC BLOOD PRESSURE: 82 MMHG | RESPIRATION RATE: 18 BRPM | HEIGHT: 61 IN | BODY MASS INDEX: 36.84 KG/M2 | OXYGEN SATURATION: 94 % | HEART RATE: 78 BPM | WEIGHT: 195.11 LBS

## 2020-07-10 LAB
APPEARANCE UR: ABNORMAL
BACTERIA URNS QL MICRO: ABNORMAL /HPF
BILIRUB UR QL: NEGATIVE
COLOR UR: ABNORMAL
EPITH CASTS URNS QL MICRO: ABNORMAL /LPF
GLUCOSE BLD STRIP.AUTO-MCNC: 119 MG/DL (ref 65–100)
GLUCOSE BLD STRIP.AUTO-MCNC: 181 MG/DL (ref 65–100)
GLUCOSE UR STRIP.AUTO-MCNC: NEGATIVE MG/DL
HGB UR QL STRIP: NEGATIVE
KETONES UR QL STRIP.AUTO: NEGATIVE MG/DL
LEUKOCYTE ESTERASE UR QL STRIP.AUTO: ABNORMAL
NITRITE UR QL STRIP.AUTO: NEGATIVE
PH UR STRIP: 6 [PH] (ref 5–8)
PROT UR STRIP-MCNC: NEGATIVE MG/DL
RBC #/AREA URNS HPF: ABNORMAL /HPF (ref 0–5)
SERVICE CMNT-IMP: ABNORMAL
SERVICE CMNT-IMP: ABNORMAL
SP GR UR REFRACTOMETRY: 1.02 (ref 1–1.03)
UR CULT HOLD, URHOLD: NORMAL
UROBILINOGEN UR QL STRIP.AUTO: 1 EU/DL (ref 0.2–1)
WBC URNS QL MICRO: ABNORMAL /HPF (ref 0–4)

## 2020-07-10 PROCEDURE — 74011250637 HC RX REV CODE- 250/637: Performed by: FAMILY MEDICINE

## 2020-07-10 PROCEDURE — 74011250636 HC RX REV CODE- 250/636: Performed by: FAMILY MEDICINE

## 2020-07-10 PROCEDURE — 74011000250 HC RX REV CODE- 250: Performed by: EMERGENCY MEDICINE

## 2020-07-10 PROCEDURE — 94618 PULMONARY STRESS TESTING: CPT

## 2020-07-10 PROCEDURE — 94761 N-INVAS EAR/PLS OXIMETRY MLT: CPT

## 2020-07-10 PROCEDURE — 81001 URINALYSIS AUTO W/SCOPE: CPT

## 2020-07-10 PROCEDURE — 94640 AIRWAY INHALATION TREATMENT: CPT

## 2020-07-10 PROCEDURE — 74011000250 HC RX REV CODE- 250: Performed by: STUDENT IN AN ORGANIZED HEALTH CARE EDUCATION/TRAINING PROGRAM

## 2020-07-10 PROCEDURE — 74011250636 HC RX REV CODE- 250/636: Performed by: STUDENT IN AN ORGANIZED HEALTH CARE EDUCATION/TRAINING PROGRAM

## 2020-07-10 PROCEDURE — 76705 ECHO EXAM OF ABDOMEN: CPT

## 2020-07-10 PROCEDURE — 74011250637 HC RX REV CODE- 250/637: Performed by: STUDENT IN AN ORGANIZED HEALTH CARE EDUCATION/TRAINING PROGRAM

## 2020-07-10 PROCEDURE — 74011636637 HC RX REV CODE- 636/637: Performed by: STUDENT IN AN ORGANIZED HEALTH CARE EDUCATION/TRAINING PROGRAM

## 2020-07-10 PROCEDURE — 82962 GLUCOSE BLOOD TEST: CPT

## 2020-07-10 RX ADMIN — METHYLPREDNISOLONE SODIUM SUCCINATE 40 MG: 40 INJECTION, POWDER, FOR SOLUTION INTRAMUSCULAR; INTRAVENOUS at 05:23

## 2020-07-10 RX ADMIN — BISACODYL 5 MG: 5 TABLET, COATED ORAL at 00:02

## 2020-07-10 RX ADMIN — MORPHINE SULFATE 2 MG: 2 INJECTION, SOLUTION INTRAMUSCULAR; INTRAVENOUS at 05:23

## 2020-07-10 RX ADMIN — IPRATROPIUM BROMIDE AND ALBUTEROL SULFATE 3 ML: .5; 3 SOLUTION RESPIRATORY (INHALATION) at 00:22

## 2020-07-10 RX ADMIN — IPRATROPIUM BROMIDE AND ALBUTEROL SULFATE 3 ML: .5; 3 SOLUTION RESPIRATORY (INHALATION) at 04:01

## 2020-07-10 RX ADMIN — IPRATROPIUM BROMIDE AND ALBUTEROL SULFATE 3 ML: .5; 3 SOLUTION RESPIRATORY (INHALATION) at 07:28

## 2020-07-10 RX ADMIN — ACETYLCYSTEINE 200 MG: 200 SOLUTION ORAL; RESPIRATORY (INHALATION) at 07:29

## 2020-07-10 RX ADMIN — Medication 10 ML: at 05:23

## 2020-07-10 RX ADMIN — IPRATROPIUM BROMIDE AND ALBUTEROL SULFATE 3 ML: .5; 3 SOLUTION RESPIRATORY (INHALATION) at 11:35

## 2020-07-10 RX ADMIN — INSULIN LISPRO 2 UNITS: 100 INJECTION, SOLUTION INTRAVENOUS; SUBCUTANEOUS at 13:12

## 2020-07-10 RX ADMIN — BENZONATATE 200 MG: 100 CAPSULE ORAL at 09:48

## 2020-07-10 NOTE — PROGRESS NOTES
Problem: Falls - Risk of  Goal: *Absence of Falls  Description: Document Mary Ann Dear Fall Risk and appropriate interventions in the flowsheet.   Outcome: Progressing Towards Goal  Note: Fall Risk Interventions:            Medication Interventions: Teach patient to arise slowly         History of Falls Interventions: Room close to nurse's station         Problem: Patient Education: Go to Patient Education Activity  Goal: Patient/Family Education  Outcome: Progressing Towards Goal     Problem: Chronic Obstructive Pulmonary Disease (COPD)  Goal: *Oxygen saturation during activity within specified parameters  Outcome: Progressing Towards Goal  Goal: *Able to remain out of bed as prescribed  Outcome: Progressing Towards Goal  Goal: *Absence of hypoxia  Outcome: Progressing Towards Goal  Goal: *Optimize nutritional status  Outcome: Progressing Towards Goal

## 2020-07-10 NOTE — PROGRESS NOTES
Problem: Falls - Risk of  Goal: *Absence of Falls  Description: Document Prakash Vasquez Fall Risk and appropriate interventions in the flowsheet.   Outcome: Progressing Towards Goal  Note: Fall Risk Interventions:            Medication Interventions: Teach patient to arise slowly         History of Falls Interventions: Room close to nurse's station         Problem: Patient Education: Go to Patient Education Activity  Goal: Patient/Family Education  Outcome: Progressing Towards Goal     Problem: Chronic Obstructive Pulmonary Disease (COPD)  Goal: *Oxygen saturation during activity within specified parameters  Outcome: Progressing Towards Goal  Goal: *Able to remain out of bed as prescribed  Outcome: Progressing Towards Goal  Goal: *Absence of hypoxia  Outcome: Progressing Towards Goal  Goal: *Optimize nutritional status  Outcome: Progressing Towards Goal

## 2020-07-10 NOTE — PROGRESS NOTES
COT  IDT me to discuss plan of care. Patient has completed 6 minute walk test and is ready for discharge today. CM took medication voucher down to pharmacy for medications. Pharmacy stated that medications will ready in one hour. Care Management Interventions  PCP Verified by CM: Yes  Mode of Transport at Discharge: Self  Transition of Care Consult (CM Consult): Discharge Planning  Current Support Network: Other(Lives with corby Gonzalez #[phone])  Confirm Follow Up Transport: Self  The Plan for Transition of Care is Related to the Following Treatment Goals : Establish new PCP, medications,  transfer Medicaid from [place] to [place]  The Patient and/or Patient Representative was Provided with a Choice of Provider and Agrees with the Discharge Plan?: Yes  Name of the Patient Representative Who was Provided with a Choice of Provider and Agrees with the Discharge Plan: Patient  Freedom of Choice List was Provided with Basic Dialogue that Supports the Patient's Individualized Plan of Care/Goals, Treatment Preferences and Shares the Quality Data Associated with the Providers?: Yes  Discharge Location  Discharge Placement: Home       7 medications from [email or web address] - [place], [place] - [facility]      Follow up with [address] [place] [address]    Specialty: Pharmacy    [address] 56 02    Please  your medications at pharmacy. Medicaid    Next steps: Follow up    [address]   [address] 3-1-1    Please call to get your Medicaid services transfers from [place] to [place]      Follow up with Jimbo García. Devi Bean NP on 7/21/2020    Specialty: Nurse Practitioner    Sujatha Dickson   [address] [address]   [phone]   Your appointment time is 8am.  This will be an in person appointment.  , Please arrive 15 minutes early. , Please keep this appointment, Bring ins card, picture id, and discharge papers     [phone] Medications at nurses station for patient at discharge.       RITESH Aldana/OLIVER  [phone]

## 2020-07-10 NOTE — PROGRESS NOTES
0700 - Report recv'd from Jai Fournier RN. Pt resting quietly in bed. No needs at this time. No sob.    0900 - Pt on phone. No needs at this time. Call bell in reach. 1100 - RT attempted 6min walk test in prep for d/c today. Pt suddenly c/o severe abdominal pain, stating she was unable to walk d/t the pain. Hospitalist was notified. Bowel sounds active. Abd soft/non tender. Pt had stated earlier her last BM was 2 days ago. Pt now stating last BM was 7/5.    1300 - Pt given dulcolax PO. ABD ultrasound ordered. 1500 - Unable to perform ultrasound. Pt will need to be NPO. Will be testing tomorrow morning. Pt now also c/o dark and odorous urine. Hospitalist notified. Will collect next void for urine specimen. PRN Morphine also added for abdominal pain and given as ordered. 1700 - Pt placed on clear liquid diet and will be NPO at midnight. States morphine brought pain to 0/10. No further needs at this time.

## 2020-07-10 NOTE — PROGRESS NOTES
Six Minute Walk:    94% on Room Air   1 minute - 93% 103 Heart Rate on Room Air  2 minute - 93%  94 Hear Rate on Room Air  3 minute - 93%  100 Heart Rate on Room Air  4 minute - 93%  102 Heart Rate on Room Air  5 minute - 92%  104 Heart Rate on Room Air  5 minute - 92%  103 Heart Rate on Room Air    Patient at rest on 94% Room Air

## 2020-07-10 NOTE — PROGRESS NOTES
Pt alert and oriented today, she complied with medications. Vitals signs stable and within acceptable range. Pt c/o pain 3/10 therapeutic distraction offered and accepted. Pt discharged with education completed and verbal understanding of medications, IV removed and cath intact. Pt did not need transportation she was riding with her fiance.

## 2020-07-11 ENCOUNTER — PATIENT OUTREACH (OUTPATIENT)
Dept: CASE MANAGEMENT | Age: 55
End: 2020-07-11

## 2020-07-11 RX ORDER — PEN NEEDLE, DIABETIC 31 GX5/16"
3 NEEDLE, DISPOSABLE MISCELLANEOUS
Qty: 30 EACH | Refills: 0 | Status: ON HOLD | OUTPATIENT
Start: 2020-07-11 | End: 2022-04-18

## 2020-07-11 NOTE — PROGRESS NOTES
Patient contacted regarding COVID-19 risk. Discussed COVID-19 related testing which was done at this time. Test results were negative. Patient informed of results, if available? Yes by hospital  Care Transition Nurse/ Ambulatory Care Manager contacted the pt by telephone to perform post discharge assessment. Verified name and  with pt as identifiers. Provided introduction to self, and explanation of the CTN/ACM role, and reason for call due to risk factors for infection and/or exposure to COVID-19. Symptoms reviewed with pt who verbalized the following symptoms: none. Due to no symptoms encounter was not routed to provider for escalation. Discussed follow-up appointments. If no appointment was previously scheduled, appointment scheduling offered:   [facility] follow up appointment(s):   Future Appointments   Date Time Provider Sujatha Dickson   2020  8:00 AM Elmer Rodriguez.,  [address]     Non-[facility] follow up appointment(s): none     Patient has following risk factors of: COPD. CTN/ACM reviewed discharge instructions, medical action plan and red flags such as increased shortness of breath, increasing fever and signs of decompensation with pt who verbalized understanding. Discussed exposure protocols and quarantine with CDC Guidelines What to do if you are sick with coronavirus disease .  pt was given an opportunity for questions and concerns. The pt agrees to contact the [facility] exposure line [phone], local health department  and PCP office for questions related to their healthcare. CTN/ACM provided contact information for future needs. Reviewed and educated  on any new and changed medications related to discharge diagnosis. Pt states she does not have the nebulizer machine- I gave her the number for the case management department at [facility] to follow up.     Patient/family/caregiver given information for [employer] and agrees to enroll no    14 day call based on severity of symptoms and risk factors.

## 2020-07-30 ENCOUNTER — PATIENT OUTREACH (OUTPATIENT)
Dept: CASE MANAGEMENT | Age: 55
End: 2020-07-30

## 2020-07-30 NOTE — PROGRESS NOTES
Patient resolved from Transition of Care episode on 7/10  Patient/family has been provided the following resources and education related to COVID-19:                         Signs, symptoms and red flags related to COVID-19            CDC exposure and quarantine guidelines            [facility] exposure contact - [phone]            Contact for their local Department of Health                 Patient currently reports that the following symptoms have improved:  no symptoms     No further outreach scheduled with this CTN/ACM. Episode of Care resolved. Patient has this CTN/ACM contact information if future needs arise.

## 2020-12-04 ENCOUNTER — HOSPITAL ENCOUNTER (EMERGENCY)
Age: 55
Discharge: HOME | End: 2020-12-05
Attending: STUDENT IN AN ORGANIZED HEALTH CARE EDUCATION/TRAINING PROGRAM

## 2020-12-04 ENCOUNTER — APPOINTMENT (OUTPATIENT)
Dept: GENERAL RADIOLOGY | Age: 55
End: 2020-12-04
Attending: STUDENT IN AN ORGANIZED HEALTH CARE EDUCATION/TRAINING PROGRAM

## 2020-12-04 DIAGNOSIS — F19.10 SUBSTANCE ABUSE (HCC): ICD-10-CM

## 2020-12-04 DIAGNOSIS — J45.901 PERSISTENT ASTHMA WITH ACUTE EXACERBATION, UNSPECIFIED ASTHMA SEVERITY: Primary | ICD-10-CM

## 2020-12-04 LAB
ALBUMIN SERPL-MCNC: 3.8 G/DL (ref 3.5–5)
ALBUMIN/GLOB SERPL: 0.9 {RATIO} (ref 1.1–2.2)
ALP SERPL-CCNC: 81 U/L (ref 45–117)
ALT SERPL-CCNC: 15 U/L (ref 12–78)
ANION GAP SERPL CALC-SCNC: 7 MMOL/L (ref 5–15)
AST SERPL W P-5'-P-CCNC: 15 U/L (ref 15–37)
BASOPHILS # BLD: 0 K/UL (ref 0–0.1)
BASOPHILS NFR BLD: 0 % (ref 0–1)
BILIRUB SERPL-MCNC: 0.5 MG/DL (ref 0.2–1)
BUN SERPL-MCNC: 5 MG/DL (ref 6–20)
BUN/CREAT SERPL: 9 (ref 12–20)
CA-I BLD-MCNC: 8.9 MG/DL (ref 8.5–10.1)
CHLORIDE SERPL-SCNC: 106 MMOL/L (ref 97–108)
CO2 SERPL-SCNC: 30 MMOL/L (ref 21–32)
CREAT SERPL-MCNC: 0.54 MG/DL (ref 0.55–1.02)
DIFFERENTIAL METHOD BLD: ABNORMAL
EOSINOPHIL # BLD: 0 K/UL (ref 0–0.4)
EOSINOPHIL NFR BLD: 1 % (ref 0–7)
ERYTHROCYTE [DISTWIDTH] IN BLOOD BY AUTOMATED COUNT: 16.7 % (ref 11.5–14.5)
ETHANOL SERPL-MCNC: 44 MG/DL
GLOBULIN SER CALC-MCNC: 4.1 G/DL (ref 2–4)
GLUCOSE SERPL-MCNC: 96 MG/DL (ref 65–100)
HCT VFR BLD AUTO: 43.1 % (ref 35–47)
HGB BLD-MCNC: 14 G/DL (ref 11.5–16)
IMM GRANULOCYTES # BLD AUTO: 0 K/UL (ref 0–0.04)
IMM GRANULOCYTES NFR BLD AUTO: 0 % (ref 0–0.5)
LYMPHOCYTES # BLD: 2.2 K/UL (ref 0.8–3.5)
LYMPHOCYTES NFR BLD: 29 % (ref 12–49)
MCH RBC QN AUTO: 29.1 PG (ref 26–34)
MCHC RBC AUTO-ENTMCNC: 32.5 G/DL (ref 30–36.5)
MCV RBC AUTO: 89.6 FL (ref 80–99)
MONOCYTES # BLD: 0.6 K/UL (ref 0–1)
MONOCYTES NFR BLD: 8 % (ref 5–13)
NEUTS SEG # BLD: 4.7 K/UL (ref 1.8–8)
NEUTS SEG NFR BLD: 62 % (ref 32–75)
NRBC # BLD: 0 K/UL (ref 0–0.01)
NRBC BLD-RTO: 0 PER 100 WBC
PLATELET # BLD AUTO: 314 K/UL (ref 150–400)
PMV BLD AUTO: 11.2 FL (ref 8.9–12.9)
POTASSIUM SERPL-SCNC: 3.4 MMOL/L (ref 3.5–5.1)
PROT SERPL-MCNC: 7.9 G/DL (ref 6.4–8.2)
RBC # BLD AUTO: 4.81 M/UL (ref 3.8–5.2)
SODIUM SERPL-SCNC: 143 MMOL/L (ref 136–145)
WBC # BLD AUTO: 7.6 K/UL (ref 3.6–11)

## 2020-12-04 PROCEDURE — 71045 X-RAY EXAM CHEST 1 VIEW: CPT

## 2020-12-04 PROCEDURE — 81001 URINALYSIS AUTO W/SCOPE: CPT

## 2020-12-04 PROCEDURE — 80307 DRUG TEST PRSMV CHEM ANLYZR: CPT

## 2020-12-04 PROCEDURE — 74011000250 HC RX REV CODE- 250: Performed by: STUDENT IN AN ORGANIZED HEALTH CARE EDUCATION/TRAINING PROGRAM

## 2020-12-04 PROCEDURE — 93005 ELECTROCARDIOGRAM TRACING: CPT

## 2020-12-04 PROCEDURE — 94640 AIRWAY INHALATION TREATMENT: CPT

## 2020-12-04 PROCEDURE — 85025 COMPLETE CBC W/AUTO DIFF WBC: CPT

## 2020-12-04 PROCEDURE — 80053 COMPREHEN METABOLIC PANEL: CPT

## 2020-12-04 PROCEDURE — 96374 THER/PROPH/DIAG INJ IV PUSH: CPT

## 2020-12-04 PROCEDURE — 36415 COLL VENOUS BLD VENIPUNCTURE: CPT

## 2020-12-04 PROCEDURE — 99284 EMERGENCY DEPT VISIT MOD MDM: CPT

## 2020-12-04 RX ORDER — IPRATROPIUM BROMIDE AND ALBUTEROL SULFATE 2.5; .5 MG/3ML; MG/3ML
3 SOLUTION RESPIRATORY (INHALATION)
Status: COMPLETED | OUTPATIENT
Start: 2020-12-04 | End: 2020-12-04

## 2020-12-04 RX ADMIN — IPRATROPIUM BROMIDE AND ALBUTEROL SULFATE 3 ML: .5; 3 SOLUTION RESPIRATORY (INHALATION) at 23:20

## 2020-12-05 ENCOUNTER — HOSPITAL ENCOUNTER (EMERGENCY)
Age: 55
Discharge: HOME | End: 2020-12-05
Attending: EMERGENCY MEDICINE
Payer: MEDICAID

## 2020-12-05 VITALS
DIASTOLIC BLOOD PRESSURE: 89 MMHG | HEIGHT: 61 IN | BODY MASS INDEX: 34.93 KG/M2 | TEMPERATURE: 98.2 F | RESPIRATION RATE: 16 BRPM | WEIGHT: 185 LBS | OXYGEN SATURATION: 95 % | HEART RATE: 84 BPM | SYSTOLIC BLOOD PRESSURE: 154 MMHG

## 2020-12-05 VITALS
BODY MASS INDEX: 34.93 KG/M2 | DIASTOLIC BLOOD PRESSURE: 95 MMHG | OXYGEN SATURATION: 98 % | WEIGHT: 185 LBS | SYSTOLIC BLOOD PRESSURE: 128 MMHG | TEMPERATURE: 98.2 F | RESPIRATION RATE: 22 BRPM | HEART RATE: 84 BPM | HEIGHT: 61 IN

## 2020-12-05 DIAGNOSIS — J44.1 COPD EXACERBATION (HCC): ICD-10-CM

## 2020-12-05 DIAGNOSIS — R06.2 WHEEZING: Primary | ICD-10-CM

## 2020-12-05 LAB
AMPHET UR QL SCN: NEGATIVE
APPEARANCE UR: CLEAR
ATRIAL RATE: 89 BPM
BACTERIA URNS QL MICRO: NEGATIVE /HPF
BARBITURATES UR QL SCN: NEGATIVE
BENZODIAZ UR QL: NEGATIVE
BILIRUB UR QL: NEGATIVE
CALCULATED P AXIS, ECG09: 78 DEGREES
CALCULATED R AXIS, ECG10: 51 DEGREES
CALCULATED T AXIS, ECG11: 43 DEGREES
CANNABINOIDS UR QL SCN: POSITIVE
COCAINE UR QL SCN: POSITIVE
COLOR UR: ABNORMAL
DIAGNOSIS, 93000: NORMAL
DRUG SCRN COMMENT,DRGCM: ABNORMAL
GLUCOSE UR STRIP.AUTO-MCNC: NEGATIVE MG/DL
HGB UR QL STRIP: ABNORMAL
KETONES UR QL STRIP.AUTO: NEGATIVE MG/DL
LEUKOCYTE ESTERASE UR QL STRIP.AUTO: NEGATIVE
METHADONE UR QL: NEGATIVE
NITRITE UR QL STRIP.AUTO: NEGATIVE
OPIATES UR QL: NEGATIVE
P-R INTERVAL, ECG05: 186 MS
PCP UR QL: NEGATIVE
PH UR STRIP: 6 [PH] (ref 5–8)
PROT UR STRIP-MCNC: NEGATIVE MG/DL
Q-T INTERVAL, ECG07: 382 MS
QRS DURATION, ECG06: 92 MS
QTC CALCULATION (BEZET), ECG08: 464 MS
RBC #/AREA URNS HPF: ABNORMAL /HPF (ref 0–5)
SP GR UR REFRACTOMETRY: <1.005 (ref 1–1.03)
UROBILINOGEN UR QL STRIP.AUTO: 0.1 EU/DL (ref 0.1–1)
VENTRICULAR RATE, ECG03: 89 BPM
WBC URNS QL MICRO: ABNORMAL /HPF (ref 0–4)

## 2020-12-05 PROCEDURE — 74011000250 HC RX REV CODE- 250: Performed by: EMERGENCY MEDICINE

## 2020-12-05 PROCEDURE — 74011250636 HC RX REV CODE- 250/636: Performed by: STUDENT IN AN ORGANIZED HEALTH CARE EDUCATION/TRAINING PROGRAM

## 2020-12-05 PROCEDURE — 74011636637 HC RX REV CODE- 636/637: Performed by: EMERGENCY MEDICINE

## 2020-12-05 PROCEDURE — 94640 AIRWAY INHALATION TREATMENT: CPT

## 2020-12-05 PROCEDURE — 99284 EMERGENCY DEPT VISIT MOD MDM: CPT

## 2020-12-05 PROCEDURE — 74011250637 HC RX REV CODE- 250/637: Performed by: EMERGENCY MEDICINE

## 2020-12-05 PROCEDURE — 74011000250 HC RX REV CODE- 250: Performed by: STUDENT IN AN ORGANIZED HEALTH CARE EDUCATION/TRAINING PROGRAM

## 2020-12-05 RX ORDER — ALBUTEROL SULFATE 90 UG/1
2 AEROSOL, METERED RESPIRATORY (INHALATION)
Qty: 1 INHALER | Refills: 1 | Status: SHIPPED | OUTPATIENT
Start: 2020-12-05 | End: 2022-01-24 | Stop reason: SDUPTHER

## 2020-12-05 RX ORDER — PREDNISONE 20 MG/1
60 TABLET ORAL
Status: COMPLETED | OUTPATIENT
Start: 2020-12-05 | End: 2020-12-05

## 2020-12-05 RX ORDER — ALBUTEROL SULFATE 90 UG/1
2 AEROSOL, METERED RESPIRATORY (INHALATION)
Status: COMPLETED | OUTPATIENT
Start: 2020-12-05 | End: 2020-12-05

## 2020-12-05 RX ORDER — IPRATROPIUM BROMIDE AND ALBUTEROL SULFATE 2.5; .5 MG/3ML; MG/3ML
3 SOLUTION RESPIRATORY (INHALATION) ONCE
Status: COMPLETED | OUTPATIENT
Start: 2020-12-05 | End: 2020-12-05

## 2020-12-05 RX ORDER — ALBUTEROL SULFATE 90 UG/1
2 AEROSOL, METERED RESPIRATORY (INHALATION)
Qty: 1 INHALER | Refills: 1 | Status: SHIPPED | OUTPATIENT
Start: 2020-12-05 | End: 2020-12-05 | Stop reason: SDUPTHER

## 2020-12-05 RX ORDER — IPRATROPIUM BROMIDE AND ALBUTEROL SULFATE 2.5; .5 MG/3ML; MG/3ML
3 SOLUTION RESPIRATORY (INHALATION)
Status: COMPLETED | OUTPATIENT
Start: 2020-12-05 | End: 2020-12-05

## 2020-12-05 RX ADMIN — ALBUTEROL SULFATE 2 PUFF: 90 AEROSOL, METERED RESPIRATORY (INHALATION) at 19:05

## 2020-12-05 RX ADMIN — PREDNISONE 60 MG: 20 TABLET ORAL at 17:44

## 2020-12-05 RX ADMIN — METHYLPREDNISOLONE SODIUM SUCCINATE 125 MG: 125 INJECTION, POWDER, FOR SOLUTION INTRAMUSCULAR; INTRAVENOUS at 01:13

## 2020-12-05 RX ADMIN — IPRATROPIUM BROMIDE AND ALBUTEROL SULFATE 3 ML: .5; 3 SOLUTION RESPIRATORY (INHALATION) at 01:29

## 2020-12-05 RX ADMIN — IPRATROPIUM BROMIDE AND ALBUTEROL SULFATE 3 ML: .5; 3 SOLUTION RESPIRATORY (INHALATION) at 18:09

## 2020-12-05 NOTE — PROGRESS NOTES
CM spoke to the patient while she was in the ER waiting room discharged  Form ER. CM tried to clarify her insurance with the registration, however, the registration did not have enough extended access to clarify any benefit from [place]. Also, there was no information obtained from [facility] in [place], that the patient stated she had started from July ! ? The patient has a disability card a a source of income. CM provided her with information of [facility] free clinic, and shelters that she needs to go to on Monday. Also, Cm encouraged  the patient to communicate with social service in [place]. Shelters are not opened on weekends or they do not give sercives due to pandemic. Hot lines is not available on weekends. Communicated with [address] [place] [address] and have them talk to the patient. But, again, as the patient is not at imminent danger for domestic violence, they could not provide any shelter for the patient.

## 2020-12-05 NOTE — ED PROVIDER NOTES
EMERGENCY DEPARTMENT HISTORY AND PHYSICAL EXAM      Date: 12/5/2020  Patient Name: Brook Lovell    History of Presenting Illness     Chief Complaint   Patient presents with    Wheezing       History Provided By: Patient    HPI: Brook Lovell, 54 y.o. female with a past medical history significant COPD and asthma presents to the ED with cc of wheezing. Patient is currently homeless as she recently broke up in engagement with her fiancé and was kicked out of her house. She states that she has been continue to smoke as well as abuse alcohol and other substances. She states that she was here yesterday for wheezing and shortness of breath. On chart review, it looks like they did blood work and a chest x-ray which showed no acute process. They treated her with duo nebs and steroids and then she was discharged home. Patient states that she has no medications at home to take for her wheezing. She denies any chest pain or fever. She has baseline intermittent cough from her asthma in the past and denies any worsening of her cough. There are no other complaints, changes, or physical findings at this time. PCP: None    Current Facility-Administered Medications on File Prior to Encounter   Medication Dose Route Frequency Provider Last Rate Last Dose    [COMPLETED] albuterol-ipratropium (DUO-NEB) 2.5 MG-0.5 MG/3 ML  3 mL Nebulization NOW Beena Alvarez MD   3 mL at 12/05/20 0129    [COMPLETED] methylPREDNISolone (PF) (Solu-MEDROL) injection 125 mg  125 mg IntraVENous NOW Beena Alvarez MD   125 mg at 12/05/20 0113    [COMPLETED] albuterol-ipratropium (DUO-NEB) 2.5 MG-0.5 MG/3 ML  3 mL Nebulization NOW Beena Alvarez MD   3 mL at 12/04/20 2320     Current Outpatient Medications on File Prior to Encounter   Medication Sig Dispense Refill    albuterol (PROVENTIL HFA, VENTOLIN HFA, PROAIR HFA) 90 mcg/actuation inhaler Take 2 Puffs by inhalation every four (4) hours as needed for Wheezing.  1 Inhaler 1    [DISCONTINUED] albuterol (PROVENTIL HFA, VENTOLIN HFA, PROAIR HFA) 90 mcg/actuation inhaler Take 2 Puffs by inhalation every four (4) hours as needed for Wheezing. 1 Inhaler 1    Nebulizers misc 3 mL by Does Not Apply route four (4) times daily as needed (sob). 30 Each 0    albuterol sulfate (PROAIR RESPICLICK) 90 mcg/actuation breath activated inhaler Take 2 Puffs by inhalation every six (6) hours as needed for Wheezing. 1 Inhaler 1    budesonide-formoteroL (SYMBICORT) 80-4.5 mcg/actuation HFAA Take 2 Puffs by inhalation two (2) times a day. 1 Inhaler 1       Past History     Past Medical History:  Past Medical History:   Diagnosis Date    Asthma     [facility]) [address] [address]     Emphysema lung ([facility] [address]        Past Surgical History:  History reviewed. No pertinent surgical history. Family History:  History reviewed. No pertinent family history. Social History:  Social History     Tobacco Use    Smoking status: Current Every Day Smoker     Packs/day: 1.00    Smokeless tobacco: Never Used    Tobacco comment: 37 years   Substance Use Topics    Alcohol use: Yes     Alcohol/week: 6.0 standard drinks     Types: 6 Cans of beer per week     Comment: daily    Drug use: Yes     Types: Marijuana, Cocaine     Comment: tonight       Allergies:  No Known Allergies      Review of Systems     Review of Systems   Constitutional: Negative for fatigue and fever. HENT: Negative. Eyes: Negative. Respiratory: Positive for cough, shortness of breath and wheezing. Cardiovascular: Negative for chest pain and leg swelling. Gastrointestinal: Negative for blood in stool, constipation, diarrhea, nausea and vomiting. Endocrine: Negative. Genitourinary: Negative for difficulty urinating and dysuria. Musculoskeletal: Negative. Skin: Negative. Negative for rash. Allergic/Immunologic: Negative. Neurological: Negative for weakness and numbness. Hematological: Negative. Psychiatric/Behavioral: Negative. Physical Exam     Physical Exam  Constitutional:       Appearance: She is well-developed. HENT:      Head: Normocephalic and atraumatic. Eyes:      Pupils: Pupils are equal, round, and reactive to light. Neck:      Musculoskeletal: Normal range of motion. Vascular: No JVD. Trachea: No tracheal deviation. Cardiovascular:      Rate and Rhythm: Normal rate and regular rhythm. Heart sounds: Normal heart sounds. No murmur. No friction rub. No gallop. Pulmonary:      Effort: Pulmonary effort is normal.      Breath sounds: No stridor. Wheezing present. No rales. Comments: Diffuse expiratory wheezes bilaterally. Patient is speaking in full sentences and appears in no respiratory distress. Abdominal:      General: Bowel sounds are normal. There is no distension. Palpations: Abdomen is soft. There is no mass. Tenderness: There is no abdominal tenderness. There is no guarding. Musculoskeletal: Normal range of motion. General: No tenderness. Skin:     General: Skin is warm and dry. Findings: No rash. Neurological:      Mental Status: She is alert and oriented to person, place, and time. Psychiatric:         Behavior: Behavior normal.         Thought Content: Thought content normal.         Judgment: Judgment normal.         Lab and Diagnostic Study Results     Labs -   No results found for this or any previous visit (from the past 12 hour(s)). Radiologic Studies -   @lastxrresult@  CT Results  (Last 48 hours)    None        CXR Results  (Last 48 hours)               12/04/20 2225  XR CHEST SNGL V Final result    Impression:  Impression[de-identified]    1. No acute cardiopulmonary disease       Narrative:  Single View Chest 12/4/2020       Comparison: July 5, 2020       Indication: Shortness of breath. Findings:   Heart size upper normal. No significant consolidation, edema or pleural   effusion. No pneumothorax. Medical Decision Making   - I am the first provider for this patient. - I reviewed the vital signs, available nursing notes, past medical history, past surgical history, family history and social history. - Initial assessment performed. The patients presenting problems have been discussed, and they are in agreement with the care plan formulated and outlined with them. I have encouraged them to ask questions as they arise throughout their visit. Vital Signs-Reviewed the patient's vital signs. Patient Vitals for the past 12 hrs:   Temp Pulse Resp BP SpO2   12/05/20 1809     96 %   12/05/20 1610 98.2 °F (36.8 °C) 78 20 (!) 139/104 96 %       Records Reviewed: Nursing Notes and Old Medical Records        ED Course:     ED Course as of Dec 05 1936   Sat Dec 05, 2020   1926 Case management is working on placement for patient    [CK]      ED Course User Index  [CK] Arnol Recio DO     7:36 PM  Case management has provided resources for the patient. She was not able to establish placement for the patient today. Provider Notes (Medical Decision Making):     MDM  Number of Diagnoses or Management Options  Diagnosis management comments: Patient was seen here yesterday for the same complaints. She had lab work and a chest x-ray done which showed no acute process. Patient has no increased oxygen requirement and is in no respiratory distress at this time. Will treat symptomatically with duo nebs and steroids and discharged home with a inhaler. Will have case management come and see the patient. Procedures         Disposition   Disposition:   Discharge        DISCHARGE PLAN:  1. Current Discharge Medication List      CONTINUE these medications which have NOT CHANGED    Details   albuterol (PROVENTIL HFA, VENTOLIN HFA, PROAIR HFA) 90 mcg/actuation inhaler Take 2 Puffs by inhalation every four (4) hours as needed for Wheezing.   Qty: 1 Inhaler, Refills: 1      Nebulizers misc 3 mL by Does Not Apply route four (4) times daily as needed (sob). Qty: 30 Each, Refills: 0      albuterol sulfate (PROAIR RESPICLICK) 90 mcg/actuation breath activated inhaler Take 2 Puffs by inhalation every six (6) hours as needed for Wheezing. Qty: 1 Inhaler, Refills: 1      budesonide-formoteroL (SYMBICORT) 80-4.5 mcg/actuation HFAA Take 2 Puffs by inhalation two (2) times a day. Qty: 1 Inhaler, Refills: 1           2. Follow-up Information     Follow up With Specialties Details Why Contact Info    Your PCP  Schedule an appointment as soon as possible for a visit      [address] [facility] EMERGENCY DEPT Emergency Medicine  As needed, If symptoms worsen [address] [place] [address]  [phone]        3. Return to ED if worse   4. Discharge Medication List as of 12/5/2020  8:00 PM            Diagnosis     Clinical Impression: No diagnosis found. Attestations:    Shree Roa, DO    Please note that this dictation was completed with [employer], the computer voice recognition software. Quite often unanticipated grammatical, syntax, homophones, and other interpretive errors are inadvertently transcribed by the computer software. Please disregard these errors. Please excuse any errors that have escaped final proofreading. Thank you.

## 2020-12-05 NOTE — ED TRIAGE NOTES
Pt reports she is homeless and was seen here last night, reports her meds were sent to the wrong pharmacy.  Patient arrives ambulatory with audible wheezing

## 2020-12-05 NOTE — ED PROVIDER NOTES
EMERGENCY DEPARTMENT HISTORY AND PHYSICAL EXAM      Date: 12/4/2020  Patient Name: Tyra Walters    History of Presenting Illness     Chief Complaint   Patient presents with    Wheezing    Shortness of Breath    Addiction problem       History Provided By: Patient    HPI: Tyra Walters, 54 y.o. female with a past medical history significant COPD, substance abuse,  presents to the ED with cc of cough, wheeze, relapsed back into alcohol and drug abuse. Patient states that she recently had a dispute with her fiancé, engagement was broken off, at which point patient started drinking, today she admits to drinking alcohol, smoking crack, smoking marijuana. Flip Delgado has kicked her out of the house, currently she is homeless. Patient went to  and told him that she would needed help for her addictions. Recommended coming to the hospital.  Currently patient complained of some mild cough, wheezing, does not have any albuterol inhalers. Denies any chest pain, palpitations, no fevers chills. There are no other complaints, changes, or physical findings at this time. PCP: None    Current Outpatient Medications   Medication Sig Dispense Refill    albuterol (PROVENTIL HFA, VENTOLIN HFA, PROAIR HFA) 90 mcg/actuation inhaler Take 2 Puffs by inhalation every four (4) hours as needed for Wheezing. 1 Inhaler 1    Nebulizers misc 3 mL by Does Not Apply route four (4) times daily as needed (sob). 30 Each 0    albuterol sulfate (PROAIR RESPICLICK) 90 mcg/actuation breath activated inhaler Take 2 Puffs by inhalation every six (6) hours as needed for Wheezing. 1 Inhaler 1    budesonide-formoteroL (SYMBICORT) 80-4.5 mcg/actuation HFAA Take 2 Puffs by inhalation two (2) times a day.  1 Inhaler 1       Past History     Past Medical History:  Past Medical History:   Diagnosis Date    Asthma     COPD (chronic obstructive pulmonary disease) ([address]     Emphysema lung ([address]        Past Surgical History:  History reviewed. No pertinent surgical history. Family History:  History reviewed. No pertinent family history. Social History:  Social History     Tobacco Use    Smoking status: Current Every Day Smoker     Packs/day: 1.00    Smokeless tobacco: Never Used    Tobacco comment: 37 years   Substance Use Topics    Alcohol use: Yes     Alcohol/week: 6.0 standard drinks     Types: 6 Cans of beer per week     Comment: daily    Drug use: Yes     Types: Marijuana, Cocaine     Comment: tonight       Allergies:  No Known Allergies      Review of Systems     Review of Systems   Constitutional: Negative for chills and fever. Eyes: Negative for photophobia and visual disturbance. Respiratory: Positive for cough and wheezing. Negative for chest tightness. Cardiovascular: Negative for chest pain and palpitations. Gastrointestinal: Negative for abdominal pain, nausea and vomiting. Musculoskeletal: Negative for myalgias. Neurological: Negative for weakness. Physical Exam     Physical Exam  Vitals signs and nursing note reviewed. Constitutional:       Appearance: She is well-developed. She is not toxic-appearing or diaphoretic. HENT:      Head: Normocephalic and atraumatic. Mouth/Throat:      Mouth: Mucous membranes are moist.      Pharynx: Oropharynx is clear. Eyes:      Extraocular Movements: Extraocular movements intact. Pupils: Pupils are equal, round, and reactive to light. Neck:      Musculoskeletal: Normal range of motion and neck supple. Cardiovascular:      Rate and Rhythm: Normal rate and regular rhythm. Pulmonary:      Effort: Pulmonary effort is normal.      Breath sounds: Examination of the right-upper field reveals wheezing. Examination of the left-upper field reveals wheezing. Examination of the right-middle field reveals wheezing. Examination of the left-middle field reveals wheezing. Examination of the right-lower field reveals wheezing.  Examination of the left-lower field reveals wheezing. Wheezing present. Chest:      Chest wall: No tenderness. Musculoskeletal: Normal range of motion. Right lower leg: She exhibits no tenderness. No edema. Left lower leg: She exhibits no tenderness. No edema. Skin:     General: Skin is warm and dry. Capillary Refill: Capillary refill takes less than 2 seconds. Neurological:      General: No focal deficit present. Mental Status: She is alert and oriented to person, place, and time. Cranial Nerves: No cranial nerve deficit. Motor: No weakness. Diagnostic Study Results     Labs -     Recent Results (from the past 12 hour(s))   DRUG SCREEN, URINE    Collection Time: 12/04/20 10:15 PM   Result Value Ref Range    AMPHETAMINES Negative Negative      BARBITURATES Negative Negative      BENZODIAZEPINES Negative Negative      COCAINE Positive (A) Negative      METHADONE Negative Negative      OPIATES Negative Negative      PCP(PHENCYCLIDINE) Negative Negative      THC (TH-CANNABINOL) Positive (A) Negative      Drug screen comment        This test is a screen for drugs of abuse in a medical setting only (i.e., they are unconfirmed results and as such must not be used for non-medical purposes, e.g.,employment testing, legal testing). Due to its inherent nature, false positive (FP) and false negative (FN) results may be obtained. Therefore, if necessary for medical care, recommend confirmation of positive findings by GC/MS.    URINALYSIS W/MICROSCOPIC    Collection Time: 12/04/20 10:15 PM   Result Value Ref Range    Color Yellow/Straw      Appearance Clear Clear      Specific gravity <1.005 1.003 - 1.030    pH (UA) 6.0 5.0 - 8.0      Protein Negative Negative mg/dL    Glucose Negative Negative mg/dL    Ketone Negative Negative mg/dL    Bilirubin Negative Negative      Blood Small (A) Negative      Urobilinogen 0.1 0.1 - 1.0 EU/dL    Nitrites Negative Negative      Leukocyte Esterase Negative Negative      WBC 0-4 0 - 4 /hpf    RBC 0-5 0 - 5 /hpf    Bacteria Negative Negative /hpf   CBC WITH AUTOMATED DIFF    Collection Time: 12/04/20 10:20 PM   Result Value Ref Range    WBC 7.6 3.6 - 11.0 K/uL    RBC 4.81 3.80 - 5.20 M/uL    HGB 14.0 11.5 - 16.0 g/dL    HCT 43.1 35.0 - 47.0 %    MCV 89.6 80.0 - 99.0 FL    MCH 29.1 26.0 - 34.0 PG    MCHC 32.5 30.0 - 36.5 g/dL    RDW 16.7 (H) 11.5 - 14.5 %    PLATELET 173 588 - 850 K/uL    MPV 11.2 8.9 - 12.9 FL    NRBC 0.0 0  WBC    ABSOLUTE NRBC 0.00 0.00 - 0.01 K/uL    NEUTROPHILS 62 32 - 75 %    LYMPHOCYTES 29 12 - 49 %    MONOCYTES 8 5 - 13 %    EOSINOPHILS 1 0 - 7 %    BASOPHILS 0 0 - 1 %    IMMATURE GRANULOCYTES 0 0.0 - 0.5 %    ABS. NEUTROPHILS 4.7 1.8 - 8.0 K/UL    ABS. LYMPHOCYTES 2.2 0.8 - 3.5 K/UL    ABS. MONOCYTES 0.6 0.0 - 1.0 K/UL    ABS. EOSINOPHILS 0.0 0.0 - 0.4 K/UL    ABS. BASOPHILS 0.0 0.0 - 0.1 K/UL    ABS. IMM. GRANS. 0.0 0.00 - 0.04 K/UL    DF AUTOMATED     METABOLIC PANEL, COMPREHENSIVE    Collection Time: 12/04/20 10:20 PM   Result Value Ref Range    Sodium 143 136 - 145 mmol/L    Potassium 3.4 (L) 3.5 - 5.1 mmol/L    Chloride 106 97 - 108 mmol/L    CO2 30 21 - 32 mmol/L    Anion gap 7 5 - 15 mmol/L    Glucose 96 65 - 100 mg/dL    BUN 5 (L) 6 - 20 mg/dL    Creatinine 0.54 (L) 0.55 - 1.02 mg/dL    BUN/Creatinine ratio 9 (L) 12 - 20      GFR est AA >60 >60 ml/min/1.73m2    GFR est non-AA >60 >60 ml/min/1.73m2    Calcium 8.9 8.5 - 10.1 mg/dL    Bilirubin, total 0.5 0.2 - 1.0 mg/dL    AST (SGOT) 15 15 - 37 U/L    ALT (SGPT) 15 12 - 78 U/L    Alk.  phosphatase 81 45 - 117 U/L    Protein, total 7.9 6.4 - 8.2 g/dL    Albumin 3.8 3.5 - 5.0 g/dL    Globulin 4.1 (H) 2.0 - 4.0 g/dL    A-G Ratio 0.9 (L) 1.1 - 2.2     ETHYL ALCOHOL    Collection Time: 12/04/20 10:20 PM   Result Value Ref Range    ALCOHOL(ETHYL),SERUM 44 (H) <10 mg/dL       Radiologic Studies -   [unfilled]  CT Results  (Last 48 hours)    None        CXR Results  (Last 48 hours)               12/04/20 1495  XR CHEST SNGL V Final result    Impression:  Impression[de-identified]    1. No acute cardiopulmonary disease       Narrative:  Single View Chest 12/4/2020       Comparison: July 5, 2020       Indication: Shortness of breath. Findings:   Heart size upper normal. No significant consolidation, edema or pleural   effusion. No pneumothorax. Medical Decision Making and ED Course   I am the first provider for this patient. I reviewed the vital signs, available nursing notes, past medical history, past surgical history, family history and social history. Vital Signs-Reviewed the patient's vital signs. Patient Vitals for the past 12 hrs:   Temp Pulse Resp BP SpO2   12/05/20 0115  84 22 (!) 128/95 98 %   12/04/20 2159 98.2 °F (36.8 °C) (!) 104 20 114/77 95 %       EKG interpretation: (Preliminary)  Normal sinus rhythm, 89, no ST elevations, normal axis  Records Reviewed: Nursing Notes    The patient presents with wheezing cough with a differential diagnosis of COPD exacerbation, pneumonia, CHF, bronchitis      Provider Notes (Medical Decision Making):     MDM   77-year-old female, history of COPD, presents emergency department feeling short of breath, wheezing, cough, patient admits to drinking alcohol and smoking crack and marijuana today requesting resources for substance abuse. Patient no distress, saturating 95% on room air, diffuse by lateral wheezing. Will order DuoNeb in ED, order chest x-ray basic lab work. Reassess patient    ED Course:   Initial assessment performed. The patients presenting problems have been discussed, and they are in agreement with the care plan formulated and outlined with them. I have encouraged them to ask questions as they arise throughout their visit.     ED Course as of Dec 05 0157   Sat Dec 05, 2020   0014 On reassessment patient sleeping, appears comfortable, still has diffuse wheezes bilateral, will give another DuoNeb treatment, Solu-Medrol IV    [PZ]   0150 Patient reassessed, sleeping comfortably, received second albuterol and Solu-Medrol, patient not tachypneic, saturating 98% on room air, breath sounds moderate inspiratory expiratory wheezing, likely chronic. Discussed results and current course with patient, patient has been giving resources for housing, substance abuse, will discharge patient from emergency department. [PZ]      ED Course User Index  [PZ] Hoa Olson MD         Procedures       Tyler Shannon MD  Procedures           Disposition       Discharged    Remove if not discharged  DISCHARGE PLAN:  1. Current Discharge Medication List      CONTINUE these medications which have NOT CHANGED    Details   Nebulizers misc 3 mL by Does Not Apply route four (4) times daily as needed (sob). Qty: 30 Each, Refills: 0      albuterol sulfate (PROAIR RESPICLICK) 90 mcg/actuation breath activated inhaler Take 2 Puffs by inhalation every six (6) hours as needed for Wheezing. Qty: 1 Inhaler, Refills: 1      budesonide-formoteroL (SYMBICORT) 80-4.5 mcg/actuation HFAA Take 2 Puffs by inhalation two (2) times a day. Qty: 1 Inhaler, Refills: 1           2. Follow-up Information     Follow up With Specialties Details Why Nader Salter MD Family Medicine In 1 week As needed [address] [facility] [address]  [phone]          3. Return to ED if worse     Diagnosis     Clinical Impression:   1. Persistent asthma with acute exacerbation, unspecified asthma severity    2. Substance abuse ([facility] [address]        Attestations:    Tyler Shannon MD    Please note that this dictation was completed with [employer], the computer voice recognition software. Quite often unanticipated grammatical, syntax, homophones, and other interpretive errors are inadvertently transcribed by the computer software. Please disregard these errors. Please excuse any errors that have escaped final proofreading. Thank you.

## 2020-12-05 NOTE — BSMART NOTE
A face to face assessment was done in [facility]. Pt is a 54year old female with a reported history of substance use presented to the ED for SOB and help for substance use. Pt was alert and oriented to name and place, appearance was poor, affect was flat, behaviors were WNL, speech was slurred, maintained minimal eye contact, thought process was WNL, thought content was unfocused due to pt falling asleep during assessment, presents with some insight and judgement on her substance use aeb pt seeking help, memory is WNL. Pt was not observed to be responding to internal stimuli and denied AVH. During assessment pt reported that she came to the hospital due to SOB and also because she needs help for her substance use, reporting that she recently relapsed after a year of being sober. Pt reported that she has been drinking alcohol and using cocaine daily. Pt reported her triggers to be that she is homeless and that she is having relationship issues with her fiance. Pt denied SI. When asked if she had thoughts of harming others, pt said yes. Pt reported having thoughts of harming the fiance. When asked if she was planning on killing him or beating him up/assaulting him, pt reported \"No, not like that, I just want to tell him how I feel\". Pt reported that she wants to \"hurt his feelings\". Pt reported poor sleep and appetite issues. Pt denied access to guns/wepons. Pt reported history of IP hospitalization at [place]. Pt reporting having no PCP, Psychiatrist or counselor. Pt stated \"I need all that\". Pt reported that most of  his father's family members suffer from mental health. Pt denied any legal issues. Pt reported history of COPD . Pt denied being on O2 or CPAP machine. Pt denied history of trauma. Pt reported having no support system. Pt reported that she is currently homeless. Pt reported that she is not on any psychiatric medication.      Findings discussed with Dr. Macey Hernandez who reported that pt does not meet criteria for IP treatment. Dr. Farzad Wu recommends pt be given OP resources for follow up. Pt was given: Mental health resources, Substance abuse resources and housing resources. ED nurse notified.

## 2020-12-05 NOTE — ED TRIAGE NOTES
Relapsed on alcohol and cocaine tonight after a year. Now having shortness of breathe. Hx Copd. Wants help for substance abuse.  Denies SI/ HI

## 2020-12-06 NOTE — DISCHARGE INSTRUCTIONS
Patient Education        Chronic Obstructive Pulmonary Disease (COPD): Care Instructions  Your Care Instructions     Chronic obstructive pulmonary disease (COPD) is a general term for a group of lung diseases, including emphysema and chronic bronchitis. People with COPD have decreased airflow in and out of the lungs, which makes it hard to breathe. The airways also can get clogged with thick mucus. Cigarette smoking is a major cause of COPD. Although there is no cure for COPD, you can slow its progress. Following your treatment plan and taking care of yourself can help you feel better and live longer. Follow-up care is a key part of your treatment and safety. Be sure to make and go to all appointments, and call your doctor if you are having problems. It's also a good idea to know your test results and keep a list of the medicines you take. How can you care for yourself at home? Staying healthy    · Do not smoke. This is the most important step you can take to prevent more damage to your lungs. If you need help quitting, talk to your doctor about stop-smoking programs and medicines. These can increase your chances of quitting for good.     · Avoid colds and flu. Get a pneumococcal vaccine shot. If you have had one before, ask your doctor whether you need a second dose. Get the flu vaccine every fall. If you must be around people with colds or the flu, wash your hands often.     · Avoid secondhand smoke, air pollution, and high altitudes. Also avoid cold, dry air and hot, humid air. Stay at home with your windows closed when air pollution is bad. Medicines and oxygen therapy    · Take your medicines exactly as prescribed. Call your doctor if you think you are having a problem with your medicine. You may be taking medicines such as:  ? Bronchodilators. These help open your airways and make breathing easier. They are either short-acting (work for 6 to 9 hours) or long-acting (work for 24 hours).  You inhale most bronchodilators, so they start to act quickly. Always carry your quick-relief inhaler with you in case you need it while you are away from home. ? Corticosteroids (prednisone, budesonide). These reduce airway inflammation. They come in pill or inhaled form. You must take these medicines every day for them to work well.     · Ask your doctor or pharmacist if a spacer is right for you. A spacer may help you get more inhaled medicine to your lungs. If you use one, ask how to use it properly.     · Do not take any vitamins, over-the-counter medicine, or herbal products without talking to your doctor first.     · If your doctor prescribed antibiotics, take them as directed. Do not stop taking them just because you feel better. You need to take the full course of antibiotics.     · If you use oxygen therapy, use the flow rate your doctor has recommended. Don't change it without talking to your doctor first. Oxygen therapy boosts the amount of oxygen in your blood and helps you breathe easier. Activity    · Get regular exercise. Walking is an easy way to get exercise. Start out slowly, and walk a little more each day.     · Pay attention to your breathing. You are exercising too hard if you can't talk while you exercise.     · Take short rest breaks when doing household chores and other activities.     · Learn breathing methods--such as breathing through pursed lips--to help you become less short of breath.     · If your doctor has not set you up with a pulmonary rehabilitation program, ask if rehab is right for you. Rehab includes exercise programs, education about your disease and how to manage it, help with diet and other changes, and emotional support. Diet    · Eat regular, healthy meals. Use bronchodilators about 1 hour before you eat to make it easier to eat. Eat several small meals instead of three large ones.  Drink beverages at the end of the meal. Avoid foods that are hard to chew.     · Eat foods that contain protein so you don't lose muscle mass.     · Talk with your doctor if you gain too much weight or if you lose weight without trying. Mental health    · Talk to your family, friends, or a therapist about your feelings. Some people feel frightened, angry, hopeless, helpless, and even guilty. Talking openly about bad feelings can help you cope. If these feelings last, talk to your doctor. When should you call for help? Call 911 anytime you think you may need emergency care. For example, call if:    · You have severe trouble breathing. Call your doctor now or seek immediate medical care if:    · You have new or worse trouble breathing.     · You cough up blood.     · You have a fever. Watch closely for changes in your health, and be sure to contact your doctor if:    · You cough more deeply or more often, especially if you notice more mucus or a change in the color of your mucus.     · You have new or worse swelling in your legs or belly.     · You are not getting better as expected. Where can you learn more? Go to http://www.gray.com/  Enter M510 in the search box to learn more about \"Chronic Obstructive Pulmonary Disease (COPD): Care Instructions. \"  Current as of: February 24, 2020               Content Version: 12.6  © 2006-2020 Healthwise, Incorporated. Care instructions adapted under license by [employer] (which disclaims liability or warranty for this information). If you have questions about a medical condition or this instruction, always ask your healthcare professional. [place] [address] any warranty or liability for your use of this information. Patient Education        Wheezing or Bronchoconstriction: Care Instructions  Your Care Instructions  Wheezing is a whistling noise made during breathing. It occurs when the small airways, or bronchial tubes, that lead to your lungs swell or contract (spasm) and become narrow.  This narrowing is called bronchoconstriction. When your airways constrict, it is hard for air to pass through and this makes it hard for you to breathe. Wheezing and bronchoconstriction can be caused by many problems, including:  · An infection such as the flu or a cold. · Allergies such as hay fever. · Diseases such as asthma or chronic obstructive pulmonary disease. · Smoking. Treatment for your wheezing depends on what is causing the problem. Your wheezing may get better without treatment. But you may need to pay attention to things that cause your wheezing and avoid them. Or you may need medicine to help treat the wheezing and to reduce the swelling or to relieve spasms in your lungs. Follow-up care is a key part of your treatment and safety. Be sure to make and go to all appointments, and call your doctor if you are having problems. It is also a good idea to know your test results and keep a list of the medicines you take. How can you care for yourself at home? · Take your medicine exactly as prescribed. Call your doctor if you think you are having a problem with your medicine. You will get more details on the specific medicine your doctor prescribes. · If your doctor prescribed antibiotics, take them as directed. Do not stop taking them just because you feel better. You need to take the full course of antibiotics. · Breathe moist air from a humidifier, hot shower, or sink filled with hot water. This may help ease your symptoms and make it easier for you to breathe. · If you have congestion in your nose and throat, drinking plenty of fluids, especially hot fluids, may help relieve your symptoms. If you have kidney, heart, or liver disease and have to limit fluids, talk with your doctor before you increase the amount of fluids you drink. · If you have mucus in your airways, it may help to breathe deeply and cough. · Do not smoke or allow others to smoke around you. Smoking can make your wheezing worse. If you need help quitting, talk to your doctor about stop-smoking programs and medicines. These can increase your chances of quitting for good. · Avoid things that may cause your wheezing. These may include colds, smoke, air pollution, dust, pollen, pets, cockroaches, stress, and cold air. When should you call for help? Call 911 anytime you think you may need emergency care. For example, call if:    · You have severe trouble breathing.     · You passed out (lost consciousness). Call your doctor now or seek immediate medical care if:    · You cough up yellow, dark brown, or bloody mucus (sputum).     · You have new or worse shortness of breath.     · Your wheezing is not getting better or it gets worse after you start taking your medicine. Watch closely for changes in your health, and be sure to contact your doctor if:    · You do not get better as expected. Where can you learn more? Go to http://www.gray.com/  Enter V454 in the search box to learn more about \"Wheezing or Bronchoconstriction: Care Instructions. \"  Current as of: February 24, 2020               Content Version: 12.6  © 6979-4887 [employer]. Care instructions adapted under license by [employer] (which disclaims liability or warranty for this information). If you have questions about a medical condition or this instruction, always ask your healthcare professional. [place] [address] any warranty or liability for your use of this information. Patient Education        Learning About Nebulizers  What is a nebulizer? A nebulizer is a tool that delivers liquid medicine as a fine mist. You breathe in the medicine through a mouthpiece or face mask. This sends the medicine directly to your airways and lungs. You breathe in the medicine for a few minutes. What is it used for? A nebulizer may be used to treat respiratory problems.  These include asthma and chronic obstructive pulmonary disease (COPD). If you have asthma, it can be used with daily controller medicines or with quick-relief medicine during an attack or flare-up. A nebulizer can make inhaling medicines easier. It may be very helpful if it is hard for you to breathe or use an inhaler. How do you use a nebulizer? 1. Put the medicine into the medicine container. Be sure to measure the right amount. 2. Make sure that the container is connected to the mouthpiece or face mask. 3. Turn on the air compressor. 4. Take deep, slow breaths through the mouthpiece or mask. Hold each breath for about 2 seconds. 5. Continue breathing until the medicine is gone from the container. When the medicine is gone, there will be no more mist coming out. This may take about 10 minutes. 6. Shake the container to make sure you get all the medicine. Where can you learn more? Go to http://www.gray.com/  Enter [id number] in the search box to learn more about \"Learning About Nebulizers. \"  Current as of: February 24, 2020               Content Version: 12.6  © 1148-4356 [employer]. Care instructions adapted under license by [employer] (which disclaims liability or warranty for this information). If you have questions about a medical condition or this instruction, always ask your healthcare professional. [place] [address] any warranty or liability for your use of this information.

## 2020-12-07 NOTE — PROGRESS NOTES
12/7/20. [facility] ( [facility] ) called for pt - messages left. CM spoke with pt - pt on the phone with boyfriend - told CM that boyfriend stated she could return home - then pt handed CCM the phone. Boyfriend kind & pleasant - told CM pt could return. Address ( [address] [facility], [address]. # [phone]. Pt given : food, hat, gloves, scarf, coat, &  boots.  Pt very  happy & thankful for the assist.

## 2020-12-07 NOTE — PROGRESS NOTES
12/7/20. CM informed pt homeless & sitting in the ED since Friday. CM met with pt. Pt here in [place] , d/t breaking up with boy friend in [place]. Pt states she also has a drug addiction of cocaine/crack & alcohol. Pt states she has a niece her , but niece has blocked her number & family will not take her in d/t her addiction. Pt states she receives SSI monthly of $783. SSE to see for insurance verification, [address]  ( [phone]) called & to f/u on as well for possible assit. Pt in waiting area with no shoes/coat & dirty.

## 2022-01-13 ENCOUNTER — HOSPITAL ENCOUNTER (EMERGENCY)
Age: 57
Discharge: HOME | End: 2022-01-13
Attending: EMERGENCY MEDICINE
Payer: MEDICAID

## 2022-01-13 ENCOUNTER — APPOINTMENT (OUTPATIENT)
Dept: GENERAL RADIOLOGY | Age: 57
End: 2022-01-13
Attending: EMERGENCY MEDICINE
Payer: MEDICAID

## 2022-01-13 VITALS
OXYGEN SATURATION: 97 % | SYSTOLIC BLOOD PRESSURE: 159 MMHG | DIASTOLIC BLOOD PRESSURE: 90 MMHG | HEIGHT: 61 IN | BODY MASS INDEX: 34.93 KG/M2 | WEIGHT: 185 LBS | TEMPERATURE: 98.1 F | HEART RATE: 78 BPM | RESPIRATION RATE: 22 BRPM

## 2022-01-13 DIAGNOSIS — R06.02 SOB (SHORTNESS OF BREATH): Primary | ICD-10-CM

## 2022-01-13 DIAGNOSIS — F19.10 SUBSTANCE ABUSE (HCC): ICD-10-CM

## 2022-01-13 DIAGNOSIS — F32.A DEPRESSION, UNSPECIFIED DEPRESSION TYPE: ICD-10-CM

## 2022-01-13 LAB
ALBUMIN SERPL-MCNC: 3.4 G/DL (ref 3.5–5)
ALBUMIN/GLOB SERPL: 0.8 {RATIO} (ref 1.1–2.2)
ALP SERPL-CCNC: 84 U/L (ref 45–117)
ALT SERPL-CCNC: 17 U/L (ref 12–78)
ANION GAP SERPL CALC-SCNC: 2 MMOL/L (ref 5–15)
AST SERPL W P-5'-P-CCNC: 12 U/L (ref 15–37)
ATRIAL RATE: 78 BPM
BASOPHILS # BLD: 0 K/UL (ref 0–0.1)
BASOPHILS NFR BLD: 0 % (ref 0–1)
BILIRUB SERPL-MCNC: 0.8 MG/DL (ref 0.2–1)
BUN SERPL-MCNC: 7 MG/DL (ref 6–20)
BUN/CREAT SERPL: 11 (ref 12–20)
CA-I BLD-MCNC: 9.3 MG/DL (ref 8.5–10.1)
CALCULATED P AXIS, ECG09: 68 DEGREES
CALCULATED R AXIS, ECG10: 45 DEGREES
CALCULATED T AXIS, ECG11: 44 DEGREES
CHLORIDE SERPL-SCNC: 106 MMOL/L (ref 97–108)
CK SERPL-CCNC: 131 NG/ML (ref 26–192)
CO2 SERPL-SCNC: 33 MMOL/L (ref 21–32)
CREAT SERPL-MCNC: 0.65 MG/DL (ref 0.55–1.02)
DIAGNOSIS, 93000: NORMAL
DIFFERENTIAL METHOD BLD: ABNORMAL
EOSINOPHIL # BLD: 0.1 K/UL (ref 0–0.4)
EOSINOPHIL NFR BLD: 1 % (ref 0–7)
ERYTHROCYTE [DISTWIDTH] IN BLOOD BY AUTOMATED COUNT: 16.7 % (ref 11.5–14.5)
GLOBULIN SER CALC-MCNC: 4.4 G/DL (ref 2–4)
GLUCOSE SERPL-MCNC: 114 MG/DL (ref 65–100)
HCT VFR BLD AUTO: 44.2 % (ref 35–47)
HGB BLD-MCNC: 14 G/DL (ref 11.5–16)
IMM GRANULOCYTES # BLD AUTO: 0 K/UL (ref 0–0.04)
IMM GRANULOCYTES NFR BLD AUTO: 0 % (ref 0–0.5)
LYMPHOCYTES # BLD: 2.2 K/UL (ref 0.8–3.5)
LYMPHOCYTES NFR BLD: 26 % (ref 12–49)
MCH RBC QN AUTO: 29.1 PG (ref 26–34)
MCHC RBC AUTO-ENTMCNC: 31.7 G/DL (ref 30–36.5)
MCV RBC AUTO: 91.9 FL (ref 80–99)
MONOCYTES # BLD: 0.7 K/UL (ref 0–1)
MONOCYTES NFR BLD: 8 % (ref 5–13)
NEUTS SEG # BLD: 5.5 K/UL (ref 1.8–8)
NEUTS SEG NFR BLD: 65 % (ref 32–75)
NRBC # BLD: 0 K/UL (ref 0–0.01)
NRBC BLD-RTO: 0 PER 100 WBC
P-R INTERVAL, ECG05: 168 MS
PLATELET # BLD AUTO: 309 K/UL (ref 150–400)
PMV BLD AUTO: 10.4 FL (ref 8.9–12.9)
POTASSIUM SERPL-SCNC: 3.6 MMOL/L (ref 3.5–5.1)
PROT SERPL-MCNC: 7.8 G/DL (ref 6.4–8.2)
Q-T INTERVAL, ECG07: 380 MS
QRS DURATION, ECG06: 82 MS
QTC CALCULATION (BEZET), ECG08: 433 MS
RBC # BLD AUTO: 4.81 M/UL (ref 3.8–5.2)
SODIUM SERPL-SCNC: 141 MMOL/L (ref 136–145)
TROPONIN-HIGH SENSITIVITY: 6 NG/L (ref 0–51)
VENTRICULAR RATE, ECG03: 78 BPM
WBC # BLD AUTO: 8.5 K/UL (ref 3.6–11)

## 2022-01-13 PROCEDURE — 71045 X-RAY EXAM CHEST 1 VIEW: CPT

## 2022-01-13 PROCEDURE — 94640 AIRWAY INHALATION TREATMENT: CPT

## 2022-01-13 PROCEDURE — 85025 COMPLETE CBC W/AUTO DIFF WBC: CPT

## 2022-01-13 PROCEDURE — 84484 ASSAY OF TROPONIN QUANT: CPT

## 2022-01-13 PROCEDURE — 80053 COMPREHEN METABOLIC PANEL: CPT

## 2022-01-13 PROCEDURE — 74011000250 HC RX REV CODE- 250: Performed by: EMERGENCY MEDICINE

## 2022-01-13 PROCEDURE — 36415 COLL VENOUS BLD VENIPUNCTURE: CPT

## 2022-01-13 PROCEDURE — 93005 ELECTROCARDIOGRAM TRACING: CPT

## 2022-01-13 PROCEDURE — 74011250637 HC RX REV CODE- 250/637: Performed by: EMERGENCY MEDICINE

## 2022-01-13 PROCEDURE — 82550 ASSAY OF CK (CPK): CPT

## 2022-01-13 PROCEDURE — 99285 EMERGENCY DEPT VISIT HI MDM: CPT

## 2022-01-13 RX ORDER — IPRATROPIUM BROMIDE AND ALBUTEROL SULFATE 2.5; .5 MG/3ML; MG/3ML
3 SOLUTION RESPIRATORY (INHALATION)
Status: COMPLETED | OUTPATIENT
Start: 2022-01-13 | End: 2022-01-13

## 2022-01-13 RX ORDER — ALBUTEROL SULFATE 90 UG/1
2 AEROSOL, METERED RESPIRATORY (INHALATION) ONCE
Status: DISCONTINUED | OUTPATIENT
Start: 2022-01-13 | End: 2022-01-13

## 2022-01-13 RX ORDER — LORAZEPAM 1 MG/1
1 TABLET ORAL
Status: COMPLETED | OUTPATIENT
Start: 2022-01-13 | End: 2022-01-13

## 2022-01-13 RX ADMIN — IPRATROPIUM BROMIDE AND ALBUTEROL SULFATE 3 ML: .5; 2.5 SOLUTION RESPIRATORY (INHALATION) at 21:41

## 2022-01-13 RX ADMIN — IPRATROPIUM BROMIDE AND ALBUTEROL SULFATE 3 ML: .5; 2.5 SOLUTION RESPIRATORY (INHALATION) at 21:15

## 2022-01-13 RX ADMIN — LORAZEPAM 1 MG: 1 TABLET ORAL at 15:30

## 2022-01-13 NOTE — ED PROVIDER NOTES
EMERGENCY DEPARTMENT HISTORY AND PHYSICAL EXAM      Date: 1/13/2022  Patient Name: Naz Davidson    History of Presenting Illness     Chief Complaint   Patient presents with    Shortness of Breath       History Provided By: Patient    HPI: Naz Davidson, 64 y.o. female with a past medical history significant Substance abuse presents to the ED with cc of 2 weeks history of shortness of breath. Patient reports alcohol and crack use during that time as well. Patient denies fevers or chills, denies nausea or vomiting. There are no other complaints, changes, or physical findings at this time. PCP: None    No current facility-administered medications on file prior to encounter. Current Outpatient Medications on File Prior to Encounter   Medication Sig Dispense Refill    albuterol (PROVENTIL HFA, VENTOLIN HFA, PROAIR HFA) 90 mcg/actuation inhaler Take 2 Puffs by inhalation every four (4) hours as needed for Wheezing. 1 Inhaler 1    Nebulizers misc 3 mL by Does Not Apply route four (4) times daily as needed (sob). 30 Each 0    albuterol sulfate (PROAIR RESPICLICK) 90 mcg/actuation breath activated inhaler Take 2 Puffs by inhalation every six (6) hours as needed for Wheezing. 1 Inhaler 1    budesonide-formoteroL (SYMBICORT) 80-4.5 mcg/actuation HFAA Take 2 Puffs by inhalation two (2) times a day. 1 Inhaler 1       Past History     Past Medical History:  Past Medical History:   Diagnosis Date    Asthma     [facility] [address]     Emphysema lung ([facility] [address]        Past Surgical History:  No past surgical history on file. Family History:  History reviewed. No pertinent family history. Social History:  Social History     Tobacco Use    Smoking status: Current Every Day Smoker     Packs/day: 1.00    Smokeless tobacco: Never Used    Tobacco comment: 37 years   Substance Use Topics    Alcohol use:  Yes     Alcohol/week: 6.0 standard drinks     Types: 6 Cans of beer per week Comment: daily    Drug use: Yes     Types: Marijuana, Cocaine     Comment: tonight       Allergies:  No Known Allergies      Review of Systems   Review of Systems   Constitutional: Negative for chills and fever. HENT: Negative for sinus pressure and sinus pain. Eyes: Negative for photophobia and redness. Respiratory: Positive for shortness of breath and wheezing. Cardiovascular: Negative for chest pain and palpitations. Gastrointestinal: Negative for abdominal pain and nausea. Genitourinary: Negative for flank pain and hematuria. Musculoskeletal: Negative for arthralgias and gait problem. Skin: Negative for color change and pallor. Neurological: Negative for dizziness and weakness. Review of Systems    Physical Exam   Physical Exam  Constitutional:       General: No acute distress. Appearance: Normal appearance. Not toxic-appearing. HENT:      Head: Normocephalic and atraumatic. Nose: Nose normal.      Mouth/Throat:      Mouth: Mucous membranes are moist.   Eyes:      Extraocular Movements: Extraocular movements intact. Pupils: Pupils are equal, round, and reactive to light. Cardiovascular:      Rate and Rhythm: Normal rate. Pulses: Normal pulses. Pulmonary:      Effort: Pulmonary effort is normal.      Breath sounds: No stridor, clear to auscultation bilaterally  Abdominal:      General: Abdomen is flat. There is no distension. Musculoskeletal:         General: Normal range of motion. Cervical back: Normal range of motion and neck supple. Skin:     General: Skin is warm and dry. Capillary Refill: Capillary refill takes less than 2 seconds. Neurological:      General: No focal deficit present. Mental Status: Alert and oriented to person, place, and time. Psychiatric:         Mood and Affect: Depressed mood, flat affect, tearful.        Physical Exam    Lab and Diagnostic Study Results     Labs -     Recent Results (from the past 12 hour(s)) EKG, 12 LEAD, INITIAL    Collection Time: 01/13/22  2:38 PM   Result Value Ref Range    Ventricular Rate 78 BPM    Atrial Rate 78 BPM    P-R Interval 168 ms    QRS Duration 82 ms    Q-T Interval 380 ms    QTC Calculation (Bezet) 433 ms    Calculated P Axis 68 degrees    Calculated R Axis 45 degrees    Calculated T Axis 44 degrees    Diagnosis       Normal sinus rhythm  Normal ECG  When compared with ECG of 04-DEC-2020 22:17,  No significant change was found  Confirmed by Luis Carlos Welch ([id number]) on 1/13/2022 2:49:32 PM     CBC WITH AUTOMATED DIFF    Collection Time: 01/13/22  2:45 PM   Result Value Ref Range    WBC 8.5 3.6 - 11.0 K/uL    RBC 4.81 3.80 - 5.20 M/uL    HGB 14.0 11.5 - 16.0 g/dL    HCT 44.2 35.0 - 47.0 %    MCV 91.9 80.0 - 99.0 FL    MCH 29.1 26.0 - 34.0 PG    MCHC 31.7 30.0 - 36.5 g/dL    RDW 16.7 (H) 11.5 - 14.5 %    PLATELET 260 470 - 564 K/uL    MPV 10.4 8.9 - 12.9 FL    NRBC 0.0 0.0  WBC    ABSOLUTE NRBC 0.00 0.00 - 0.01 K/uL    NEUTROPHILS 65 32 - 75 %    LYMPHOCYTES 26 12 - 49 %    MONOCYTES 8 5 - 13 %    EOSINOPHILS 1 0 - 7 %    BASOPHILS 0 0 - 1 %    IMMATURE GRANULOCYTES 0 0 - 0.5 %    ABS. NEUTROPHILS 5.5 1.8 - 8.0 K/UL    ABS. LYMPHOCYTES 2.2 0.8 - 3.5 K/UL    ABS. MONOCYTES 0.7 0.0 - 1.0 K/UL    ABS. EOSINOPHILS 0.1 0.0 - 0.4 K/UL    ABS. BASOPHILS 0.0 0.0 - 0.1 K/UL    ABS. IMM. GRANS. 0.0 0.00 - 0.04 K/UL    DF AUTOMATED         Radiologic Studies -   @lastxrresult@  CT Results  (Last 48 hours)    None        CXR Results  (Last 48 hours)               01/13/22 1447  XR CHEST PORT Final result    Impression:  The cardiomediastinal silhouette is appropriate for age, technique,   and lung expansion. Pulmonary vasculature is not congested. The lungs are   essentially clear. No effusion or pneumothorax is seen. Narrative:  1 new comparison December 4                   Medical Decision Making   - I am the first provider for this patient.     - I reviewed the vital signs, available nursing notes, past medical history, past surgical history, family history and social history. - Initial assessment performed. The patients presenting problems have been discussed, and they are in agreement with the care plan formulated and outlined with them. I have encouraged them to ask questions as they arise throughout their visit. Vital Signs-Reviewed the patient's vital signs. Patient Vitals for the past 12 hrs:   Temp Pulse Resp BP SpO2   01/13/22 1430 98.1 °F (36.7 °C) 76 22 (!) 130/91 97 %     ED Course:     ED Course as of 01/13/22 2004   Thu Jan 13, 2022 2003 Discussed case with [facility]. Patient requesting detox, however psychiatry does not believe she meets inpatient criteria. Resources given for outpatient detox. [CS]      ED Course User Index  [CS] Ulis Cockayne, MD         Disposition   Disposition: DC- Adult Discharges: All of the diagnostic tests were reviewed and questions answered. Diagnosis, care plan and treatment options were discussed. The patient understands the instructions and will follow up as directed. The patients results have been reviewed with them. They have been counseled regarding their diagnosis. The patient verbally convey understanding and agreement of the signs, symptoms, diagnosis, treatment and prognosis and additionally agrees to follow up as recommended with their PCP in 24 - 48 hours. They also agree with the care-plan and convey that all of their questions have been answered. I have also put together some discharge instructions for them that include: 1) educational information regarding their diagnosis, 2) how to care for their diagnosis at home, as well a 3) list of reasons why they would want to return to the ED prior to their follow-up appointment, should their condition change. DISCHARGE PLAN:  1.    Current Discharge Medication List      CONTINUE these medications which have NOT CHANGED    Details   albuterol (PROVENTIL HFA, VENTOLIN HFA, PROAIR HFA) 90 mcg/actuation inhaler Take 2 Puffs by inhalation every four (4) hours as needed for Wheezing. Qty: 1 Inhaler, Refills: 1      Nebulizers misc 3 mL by Does Not Apply route four (4) times daily as needed (sob). Qty: 30 Each, Refills: 0      albuterol sulfate (PROAIR RESPICLICK) 90 mcg/actuation breath activated inhaler Take 2 Puffs by inhalation every six (6) hours as needed for Wheezing. Qty: 1 Inhaler, Refills: 1      budesonide-formoteroL (SYMBICORT) 80-4.5 mcg/actuation HFAA Take 2 Puffs by inhalation two (2) times a day. Qty: 1 Inhaler, Refills: 1           2. Follow-up Information    None       3. Return to ED if worse   4. Current Discharge Medication List            Diagnosis     Clinical Impression:   1. SOB (shortness of breath)    2. Substance abuse ([facility] [address]    3. Depression, unspecified depression type        Attestations:    Samy Talley MD    Please note that this dictation was completed with [employer] the [employer] voice recognition software. Quite often unanticipated grammatical, syntax, homophones, and other interpretive errors are inadvertently transcribed by the computer software. Please disregard these errors. Please excuse any errors that have escaped final proofreading. Thank you.

## 2022-01-13 NOTE — Clinical Note
[address] [facility] EMERGENCY DEPT  [address] [place] [address] [place] [address]  [phone]    Work/School Note    Date: 1/13/2022    To Whom It May concern:    Juan Pablo Ya was seen and treated today in the emergency room by the following provider(s):  Attending Provider: Giselle Guerrero MD.      Juan Pablo Ya is excused from work/school on 01/13/22 and 01/14/22. She is medically clear to return to work/school on 1/15/2022.        Sincerely,          Angel Juarez MD
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
## 2022-01-14 NOTE — DISCHARGE INSTRUCTIONS
Thank you! Thank you for allowing me to care for you in the emergency department. I sincerely hope that you are satisfied with your visit today. It is my goal to provide you with excellent care. Below you will find a list of your labs and imaging from your visit today. Should you have any questions regarding these results please do not hesitate to call the emergency department. Labs -     Recent Results (from the past 12 hour(s))   EKG, 12 LEAD, INITIAL    Collection Time: 01/13/22  2:38 PM   Result Value Ref Range    Ventricular Rate 78 BPM    Atrial Rate 78 BPM    P-R Interval 168 ms    QRS Duration 82 ms    Q-T Interval 380 ms    QTC Calculation (Bezet) 433 ms    Calculated P Axis 68 degrees    Calculated R Axis 45 degrees    Calculated T Axis 44 degrees    Diagnosis       Normal sinus rhythm  Normal ECG  When compared with ECG of 04-DEC-2020 22:17,  No significant change was found  Confirmed by Ashley Lowe ([id number]) on 1/13/2022 2:49:32 PM     CBC WITH AUTOMATED DIFF    Collection Time: 01/13/22  2:45 PM   Result Value Ref Range    WBC 8.5 3.6 - 11.0 K/uL    RBC 4.81 3.80 - 5.20 M/uL    HGB 14.0 11.5 - 16.0 g/dL    HCT 44.2 35.0 - 47.0 %    MCV 91.9 80.0 - 99.0 FL    MCH 29.1 26.0 - 34.0 PG    MCHC 31.7 30.0 - 36.5 g/dL    RDW 16.7 (H) 11.5 - 14.5 %    PLATELET 386 934 - 203 K/uL    MPV 10.4 8.9 - 12.9 FL    NRBC 0.0 0.0  WBC    ABSOLUTE NRBC 0.00 0.00 - 0.01 K/uL    NEUTROPHILS 65 32 - 75 %    LYMPHOCYTES 26 12 - 49 %    MONOCYTES 8 5 - 13 %    EOSINOPHILS 1 0 - 7 %    BASOPHILS 0 0 - 1 %    IMMATURE GRANULOCYTES 0 0 - 0.5 %    ABS. NEUTROPHILS 5.5 1.8 - 8.0 K/UL    ABS. LYMPHOCYTES 2.2 0.8 - 3.5 K/UL    ABS. MONOCYTES 0.7 0.0 - 1.0 K/UL    ABS. EOSINOPHILS 0.1 0.0 - 0.4 K/UL    ABS. BASOPHILS 0.0 0.0 - 0.1 K/UL    ABS. IMM.  GRANS. 0.0 0.00 - 0.04 K/UL    DF AUTOMATED     METABOLIC PANEL, COMPREHENSIVE    Collection Time: 01/13/22  2:45 PM   Result Value Ref Range    Sodium 141 136 - 145 mmol/L Potassium 3.6 3.5 - 5.1 mmol/L    Chloride 106 97 - 108 mmol/L    CO2 33 (H) 21 - 32 mmol/L    Anion gap 2 (L) 5 - 15 mmol/L    Glucose 114 (H) 65 - 100 mg/dL    BUN 7 6 - 20 mg/dL    Creatinine 0.65 0.55 - 1.02 mg/dL    BUN/Creatinine ratio 11 (L) 12 - 20      GFR est AA >60 >60 ml/min/1.73m2    GFR est non-AA >60 >60 ml/min/1.73m2    Calcium 9.3 8.5 - 10.1 mg/dL    Bilirubin, total 0.8 0.2 - 1.0 mg/dL    AST (SGOT) 12 (L) 15 - 37 U/L    ALT (SGPT) 17 12 - 78 U/L    Alk. phosphatase 84 45 - 117 U/L    Protein, total 7.8 6.4 - 8.2 g/dL    Albumin 3.4 (L) 3.5 - 5.0 g/dL    Globulin 4.4 (H) 2.0 - 4.0 g/dL    A-G Ratio 0.8 (L) 1.1 - 2.2     CK W/ REFLX CKMB    Collection Time: 01/13/22  2:45 PM   Result Value Ref Range    .0 26 - 192 ng/mL   TROPONIN-HIGH SENSITIVITY    Collection Time: 01/13/22  2:45 PM   Result Value Ref Range    Troponin-High Sensitivity 6 0 - 51 ng/L       Radiologic Studies -   XR CHEST PORT   Final Result   The cardiomediastinal silhouette is appropriate for age, technique,   and lung expansion. Pulmonary vasculature is not congested. The lungs are   essentially clear. No effusion or pneumothorax is seen. CT Results  (Last 48 hours)      None          CXR Results  (Last 48 hours)                 01/13/22 1447  XR CHEST PORT Final result    Impression:  The cardiomediastinal silhouette is appropriate for age, technique,   and lung expansion. Pulmonary vasculature is not congested. The lungs are   essentially clear. No effusion or pneumothorax is seen. Narrative:  1 new comparison December 4                      If you feel that you have not received excellent quality care or timely care, please ask to speak to the nurse manager. Please choose us in the future for your continued health care needs.    ------------------------------------------------------------------------------------------------------------  The exam and treatment you received in the Emergency Department were for an urgent problem and are not intended as complete care. It is important that you follow-up with a doctor, nurse practitioner, or physician assistant to:  (1) confirm your diagnosis,  (2) re-evaluation of changes in your illness and treatment, and  (3) for ongoing care. If your symptoms become worse or you do not improve as expected and you are unable to reach your usual health care provider, you should return to the Emergency Department. We are available 24 hours a day. Please take your discharge instructions with you when you go to your follow-up appointment. If you have any problem arranging a follow-up appointment, contact the Emergency Department immediately. If a prescription has been provided, please have it filled as soon as possible to prevent a delay in treatment. Read the entire medication instruction sheet provided to you by the pharmacy. If you have any questions or reservations about taking the medication due to side effects or interactions with other medications, please call your primary care physician or contact the ER to speak with the charge nurse. Make an appointment with your family doctor or the physician you were referred to for follow-up of this visit as instructed on your discharge paperwork, as this is a mandatory follow-up. Return to the ER if you are unable to be seen or if you are unable to be seen in a timely manner. If you have any problem arranging the follow-up visit, contact the Emergency Department immediately.

## 2022-01-24 ENCOUNTER — APPOINTMENT (OUTPATIENT)
Dept: GENERAL RADIOLOGY | Age: 57
End: 2022-01-24
Attending: PHYSICIAN ASSISTANT
Payer: MEDICAID

## 2022-01-24 ENCOUNTER — HOSPITAL ENCOUNTER (EMERGENCY)
Age: 57
Discharge: HOME | End: 2022-01-24
Attending: EMERGENCY MEDICINE
Payer: MEDICAID

## 2022-01-24 VITALS
BODY MASS INDEX: 34.36 KG/M2 | WEIGHT: 182 LBS | HEIGHT: 61 IN | OXYGEN SATURATION: 94 % | HEART RATE: 81 BPM | TEMPERATURE: 97.9 F | SYSTOLIC BLOOD PRESSURE: 150 MMHG | DIASTOLIC BLOOD PRESSURE: 85 MMHG | RESPIRATION RATE: 18 BRPM

## 2022-01-24 DIAGNOSIS — J44.1 COPD EXACERBATION (HCC): Primary | ICD-10-CM

## 2022-01-24 DIAGNOSIS — Z72.0 TOBACCO ABUSE: ICD-10-CM

## 2022-01-24 DIAGNOSIS — Z59.00 HOMELESS: ICD-10-CM

## 2022-01-24 PROCEDURE — 96372 THER/PROPH/DIAG INJ SC/IM: CPT

## 2022-01-24 PROCEDURE — 74011250636 HC RX REV CODE- 250/636: Performed by: PHYSICIAN ASSISTANT

## 2022-01-24 PROCEDURE — 94640 AIRWAY INHALATION TREATMENT: CPT

## 2022-01-24 PROCEDURE — 71045 X-RAY EXAM CHEST 1 VIEW: CPT

## 2022-01-24 PROCEDURE — 74011000250 HC RX REV CODE- 250: Performed by: PHYSICIAN ASSISTANT

## 2022-01-24 PROCEDURE — 77030025612 HC NEB KT VYRM -A

## 2022-01-24 PROCEDURE — 99284 EMERGENCY DEPT VISIT MOD MDM: CPT

## 2022-01-24 RX ORDER — PREDNISONE 10 MG/1
TABLET ORAL
Qty: 21 TABLET | Refills: 0 | OUTPATIENT
Start: 2022-01-25 | End: 2022-02-04

## 2022-01-24 RX ORDER — DEXAMETHASONE SODIUM PHOSPHATE 100 MG/10ML
10 INJECTION INTRAMUSCULAR; INTRAVENOUS ONCE
Status: COMPLETED | OUTPATIENT
Start: 2022-01-24 | End: 2022-01-24

## 2022-01-24 RX ORDER — ALBUTEROL SULFATE 0.83 MG/ML
2.5 SOLUTION RESPIRATORY (INHALATION)
Qty: 30 NEBULE | Refills: 0 | OUTPATIENT
Start: 2022-01-24 | End: 2022-02-04

## 2022-01-24 RX ORDER — IPRATROPIUM BROMIDE AND ALBUTEROL SULFATE 2.5; .5 MG/3ML; MG/3ML
3 SOLUTION RESPIRATORY (INHALATION)
Status: COMPLETED | OUTPATIENT
Start: 2022-01-24 | End: 2022-01-24

## 2022-01-24 RX ORDER — AZITHROMYCIN 250 MG/1
TABLET, FILM COATED ORAL
Qty: 6 TABLET | Refills: 0 | Status: ON HOLD | OUTPATIENT
Start: 2022-01-24 | End: 2022-04-18

## 2022-01-24 RX ORDER — ALBUTEROL SULFATE 90 UG/1
2 AEROSOL, METERED RESPIRATORY (INHALATION)
Qty: 1 EACH | Refills: 0 | OUTPATIENT
Start: 2022-01-24 | End: 2022-02-04

## 2022-01-24 RX ORDER — ALBUTEROL SULFATE 90 UG/1
2 AEROSOL, METERED RESPIRATORY (INHALATION)
Status: DISCONTINUED | OUTPATIENT
Start: 2022-01-24 | End: 2022-01-25 | Stop reason: HOSPADM

## 2022-01-24 RX ADMIN — DEXAMETHASONE SODIUM PHOSPHATE 10 MG: 10 INJECTION INTRAMUSCULAR; INTRAVENOUS at 20:29

## 2022-01-24 RX ADMIN — IPRATROPIUM BROMIDE AND ALBUTEROL SULFATE 3 ML: 2.5; .5 SOLUTION RESPIRATORY (INHALATION) at 20:08

## 2022-01-24 RX ADMIN — IPRATROPIUM BROMIDE AND ALBUTEROL SULFATE 3 ML: 2.5; .5 SOLUTION RESPIRATORY (INHALATION) at 21:30

## 2022-01-24 SDOH — ECONOMIC STABILITY - HOUSING INSECURITY: HOMELESSNESS UNSPECIFIED: Z59.00

## 2022-01-25 NOTE — ED PROVIDER NOTES
EMERGENCY DEPARTMENT HISTORY AND PHYSICAL EXAM      Date: 1/24/2022  Patient Name: Ellen Swenson    History of Presenting Illness     Chief Complaint   Patient presents with    Wheezing     History Provided By: Patient    HPI: Ellen Swenson, 64 y.o. female with medical history significant for COPD/asthma, polysubstance abuse and tobacco abuse who presents via EMS to the ED with cc of acute moderate persistent shortness of breath, wheezing, dry cough, chest tightness X 4 days and worsening today. No medications modifying factors. States that she does not have any albuterol at home. No fever, chills, nausea, vomiting, chest pain, lightheadedness, dizziness, hemoptysis, abdominal pain, palpitations, leg pain or swelling, orthopnea, PND, dyspnea on exertion. Denies any known exposure to COVID-19 or sick contacts. States that she did receive her Covid vaccine. PCP: None    There are no other complaints, changes, or physical findings at this time. No current facility-administered medications on file prior to encounter. Current Outpatient Medications on File Prior to Encounter   Medication Sig Dispense Refill    [DISCONTINUED] albuterol (PROVENTIL HFA, VENTOLIN HFA, PROAIR HFA) 90 mcg/actuation inhaler Take 2 Puffs by inhalation every four (4) hours as needed for Wheezing. 1 Inhaler 1    Nebulizers misc 3 mL by Does Not Apply route four (4) times daily as needed (sob). 30 Each 0    albuterol sulfate (PROAIR RESPICLICK) 90 mcg/actuation breath activated inhaler Take 2 Puffs by inhalation every six (6) hours as needed for Wheezing. 1 Inhaler 1    budesonide-formoteroL (SYMBICORT) 80-4.5 mcg/actuation HFAA Take 2 Puffs by inhalation two (2) times a day. 1 Inhaler 1     Past History     Past Medical History:  Past Medical History:   Diagnosis Date    Asthma     [facility] [address]     Emphysema lung ([facility] [address]      Past Surgical History:  History reviewed.  No pertinent surgical history. Family History:  History reviewed. No pertinent family history. Social History:  Social History     Tobacco Use    Smoking status: Current Every Day Smoker     Packs/day: 1.00     Years: 37.00     Pack years: 37.00    Smokeless tobacco: Never Used    Tobacco comment: 37 years   Substance Use Topics    Alcohol use: Yes     Alcohol/week: 6.0 standard drinks     Types: 6 Cans of beer per week     Comment: daily    Drug use: Yes     Types: Marijuana, Cocaine     Comment: tonight     Allergies:  No Known Allergies  Review of Systems   Review of Systems   Constitutional: Negative for activity change, chills, fatigue and fever. HENT: Negative for congestion, ear discharge, ear pain, postnasal drip, rhinorrhea, sneezing, sore throat and trouble swallowing. Eyes: Negative for pain and redness. Respiratory: Positive for cough, chest tightness, shortness of breath and wheezing. Negative for stridor. Cardiovascular: Negative for chest pain, palpitations and leg swelling. Gastrointestinal: Negative for abdominal pain, diarrhea, nausea and vomiting. Genitourinary: Negative. Musculoskeletal: Negative for arthralgias, myalgias, neck pain and neck stiffness. Skin: Negative. Negative for rash. Allergic/Immunologic: Negative for environmental allergies. Neurological: Negative for headaches. Psychiatric/Behavioral: Negative. Physical Exam   Physical Exam  Vitals and nursing note reviewed. Constitutional:       General: She is not in acute distress. Appearance: Normal appearance. She is well-developed. She is not ill-appearing, toxic-appearing or diaphoretic. HENT:      Head: Normocephalic and atraumatic. Right Ear: Hearing and external ear normal.      Left Ear: Hearing and external ear normal.      Nose: Nose normal. No congestion or rhinorrhea.       Mouth/Throat:      Mouth: Mucous membranes are moist.   Eyes:      Conjunctiva/sclera: Conjunctivae normal.      Pupils: Pupils are equal, round, and reactive to light. Cardiovascular:      Rate and Rhythm: Normal rate and regular rhythm. Pulmonary:      Effort: Pulmonary effort is normal. No tachypnea, accessory muscle usage or respiratory distress. Breath sounds: No stridor. Wheezing present. No rhonchi or rales. Chest:      Chest wall: No tenderness. Abdominal:      General: Bowel sounds are normal. There is no distension. Palpations: Abdomen is soft. Tenderness: There is no abdominal tenderness. There is no guarding. Musculoskeletal:         General: Normal range of motion. Cervical back: Normal range of motion. Skin:     General: Skin is warm and dry. Neurological:      Mental Status: She is alert and oriented to person, place, and time. Psychiatric:         Behavior: Behavior normal.         Thought Content: Thought content normal.         Judgment: Judgment normal.       Diagnostic Study Results   Labs -   No results found for this or any previous visit (from the past 12 hour(s)). Radiologic Studies -   XR CHEST PORT   Final Result   No evidence of acute cardiopulmonary process. XR CHEST PORT    Result Date: 1/24/2022  No evidence of acute cardiopulmonary process. Medical Decision Making   I am the first provider for this patient. I reviewed the vital signs, available nursing notes, past medical history, past surgical history, family history and social history. Vital Signs-Reviewed the patient's vital signs. Patient Vitals for the past 24 hrs:   Temp Pulse Resp BP SpO2   01/24/22 2117    (!) 150/85    01/24/22 2028    102/76    01/24/22 1925 97.9 °F (36.6 °C) 81 18 93/73 94 %     Pulse Oximetry Analysis - 94% on RA (normal)    Records Reviewed: Nursing Notes, Old Medical Records, Previous Radiology Studies and Previous Laboratory Studies    Provider Notes (Medical Decision Making):   Patient presents with SOB and wheezing.  DDx: asthma, acute bronchitis, copd, pulmonary edema, pna. Will treat with nebs and steroids for now and only obtain labs, EKG and CXR PRN    The patient has been re-examined after nebulizer treatments and states that they are feeling better and have no new complaints. On auscultation, wheezing is significantly improved. Laboratory tests, medications, x-rays, diagnosis, follow up plan and return instructions have been reviewed and discussed with the patient. The patient has had the opportunity to ask questions about their care. The patient expresses understanding and agreement with diagnosis, care plan; including oral steroids, nebulizer or inhaler use, follow up and return instructions. The patient agrees to return in 24 hours if their symptoms are not improving or immediately if they have any change in their condition including worsening wheezing or any signs of increasing work of breathing. ED Course:   Initial assessment performed. The patients presenting problems have been discussed, and they are in agreement with the care plan formulated and outlined with them. I have encouraged them to ask questions as they arise throughout their visit. ED Course as of 01/24/22 2209   Mon Jan 24, 2022   2115 Pt resting comfortably in room in NAD. Wheezing improved, but still present in BLF. Repeat duoneb ordered. Will continue to monitor. [SM]   8580 Pt's wheezing significantly improved. Resting comfortably in room. [SM]      ED Course User Index  [SM] Rose Castro PA-C       Progress Note:   Updated pt on all returned results and findings. Discussed the importance of proper follow up as referred below along with return precautions. Pt in agreement with the care plan and expresses agreement with and understanding of all items discussed. Disposition:  10:09 PM  I have discussed with patient their diagnosis, treatment, and follow up plan.  The patient agrees to follow up as outlined in discharge paperwork and also to return to the ED with any worsening. Du Boyle PA-C      PLAN:  1. Current Discharge Medication List      START taking these medications    Details   albuterol (PROVENTIL VENTOLIN) 2.5 mg /3 mL (0.083 %) nebu 3 mL by Nebulization route every four (4) hours as needed for Wheezing. Qty: 30 Nebule, Refills: 0  Start date: 1/24/2022      predniSONE (STERAPRED DS) 10 mg dose pack See administration instruction per 10mg dose pack  Qty: 21 Tablet, Refills: 0  Start date: 1/25/2022      azithromycin (ZITHROMAX) 250 mg tablet Take two tablets today then one tablet daily  Qty: 6 Tablet, Refills: 0  Start date: 1/24/2022         CONTINUE these medications which have CHANGED    Details   albuterol (PROVENTIL HFA, VENTOLIN HFA, PROAIR HFA) 90 mcg/actuation inhaler Take 2 Puffs by inhalation every four (4) hours as needed for Wheezing. Qty: 1 Each, Refills: 0  Start date: 1/24/2022         CONTINUE these medications which have NOT CHANGED    Details   albuterol sulfate (PROAIR RESPICLICK) 90 mcg/actuation breath activated inhaler Take 2 Puffs by inhalation every six (6) hours as needed for Wheezing. Qty: 1 Inhaler, Refills: 1           2. Follow-up Information     Follow up With Specialties Details Why [address]  Schedule an appointment as soon as possible for a visit in 1 week As needed [facility]  Sujatha Somers  Schedule an appointment as soon as possible for a visit in 1 week As needed [address] [facility] [address] [place] [address]    [facility] EMERGENCY DEPT Emergency Medicine Go to  As needed, If symptoms worsen [address] [facility]  [phone]        Return to ED if worse     Diagnosis     Clinical Impression:   1. COPD exacerbation ([address]    2. Tobacco abuse    3. Homeless            Please note that this dictation was completed with Dragon, computer voice recognition software.   Quite often unanticipated grammatical, syntax, homophones, and other interpretive errors are inadvertently transcribed by the computer software. Please disregard these errors. Additionally, please excuse any errors that have escaped final proofreading.

## 2022-02-04 ENCOUNTER — APPOINTMENT (OUTPATIENT)
Dept: GENERAL RADIOLOGY | Age: 57
End: 2022-02-04
Attending: EMERGENCY MEDICINE
Payer: MEDICAID

## 2022-02-04 ENCOUNTER — HOSPITAL ENCOUNTER (EMERGENCY)
Age: 57
Discharge: HOME | End: 2022-02-04
Attending: EMERGENCY MEDICINE | Admitting: EMERGENCY MEDICINE
Payer: MEDICAID

## 2022-02-04 VITALS
DIASTOLIC BLOOD PRESSURE: 90 MMHG | RESPIRATION RATE: 21 BRPM | HEART RATE: 81 BPM | SYSTOLIC BLOOD PRESSURE: 120 MMHG | TEMPERATURE: 97.9 F | OXYGEN SATURATION: 98 %

## 2022-02-04 DIAGNOSIS — J44.1 ACUTE EXACERBATION OF CHRONIC OBSTRUCTIVE PULMONARY DISEASE (COPD) (HCC): Primary | ICD-10-CM

## 2022-02-04 LAB
ALBUMIN SERPL-MCNC: 3 G/DL (ref 3.5–5)
ALBUMIN/GLOB SERPL: 0.8 {RATIO} (ref 1.1–2.2)
ALP SERPL-CCNC: 62 U/L (ref 45–117)
ALT SERPL-CCNC: 14 U/L (ref 12–78)
ANION GAP SERPL CALC-SCNC: 3 MMOL/L (ref 5–15)
AST SERPL W P-5'-P-CCNC: 9 U/L (ref 15–37)
ATRIAL RATE: 77 BPM
BASOPHILS # BLD: 0 K/UL (ref 0–0.1)
BASOPHILS NFR BLD: 0 % (ref 0–1)
BILIRUB SERPL-MCNC: 0.5 MG/DL (ref 0.2–1)
BNP SERPL-MCNC: 257 PG/ML
BUN SERPL-MCNC: 9 MG/DL (ref 6–20)
BUN/CREAT SERPL: 15 (ref 12–20)
CA-I BLD-MCNC: 8.5 MG/DL (ref 8.5–10.1)
CALCULATED P AXIS, ECG09: 77 DEGREES
CALCULATED R AXIS, ECG10: 59 DEGREES
CALCULATED T AXIS, ECG11: 44 DEGREES
CHLORIDE SERPL-SCNC: 110 MMOL/L (ref 97–108)
CO2 SERPL-SCNC: 31 MMOL/L (ref 21–32)
CREAT SERPL-MCNC: 0.61 MG/DL (ref 0.55–1.02)
DIAGNOSIS, 93000: NORMAL
DIFFERENTIAL METHOD BLD: ABNORMAL
EOSINOPHIL # BLD: 0.1 K/UL (ref 0–0.4)
EOSINOPHIL NFR BLD: 1 % (ref 0–7)
ERYTHROCYTE [DISTWIDTH] IN BLOOD BY AUTOMATED COUNT: 16.1 % (ref 11.5–14.5)
GLOBULIN SER CALC-MCNC: 3.7 G/DL (ref 2–4)
GLUCOSE SERPL-MCNC: 101 MG/DL (ref 65–100)
HCT VFR BLD AUTO: 37.3 % (ref 35–47)
HGB BLD-MCNC: 11.7 G/DL (ref 11.5–16)
IMM GRANULOCYTES # BLD AUTO: 0 K/UL (ref 0–0.04)
IMM GRANULOCYTES NFR BLD AUTO: 1 % (ref 0–0.5)
LYMPHOCYTES # BLD: 2.1 K/UL (ref 0.8–3.5)
LYMPHOCYTES NFR BLD: 31 % (ref 12–49)
MCH RBC QN AUTO: 29.1 PG (ref 26–34)
MCHC RBC AUTO-ENTMCNC: 31.4 G/DL (ref 30–36.5)
MCV RBC AUTO: 92.8 FL (ref 80–99)
MONOCYTES # BLD: 0.5 K/UL (ref 0–1)
MONOCYTES NFR BLD: 7 % (ref 5–13)
NEUTS SEG # BLD: 4 K/UL (ref 1.8–8)
NEUTS SEG NFR BLD: 60 % (ref 32–75)
NRBC # BLD: 0 K/UL (ref 0–0.01)
NRBC BLD-RTO: 0 PER 100 WBC
P-R INTERVAL, ECG05: 160 MS
PLATELET # BLD AUTO: 227 K/UL (ref 150–400)
PMV BLD AUTO: 10.8 FL (ref 8.9–12.9)
POTASSIUM SERPL-SCNC: 3 MMOL/L (ref 3.5–5.1)
PROT SERPL-MCNC: 6.7 G/DL (ref 6.4–8.2)
Q-T INTERVAL, ECG07: 394 MS
QRS DURATION, ECG06: 88 MS
QTC CALCULATION (BEZET), ECG08: 445 MS
RBC # BLD AUTO: 4.02 M/UL (ref 3.8–5.2)
SODIUM SERPL-SCNC: 144 MMOL/L (ref 136–145)
TROPONIN-HIGH SENSITIVITY: 13 NG/L (ref 0–51)
VENTRICULAR RATE, ECG03: 77 BPM
WBC # BLD AUTO: 6.6 K/UL (ref 3.6–11)

## 2022-02-04 PROCEDURE — 93005 ELECTROCARDIOGRAM TRACING: CPT

## 2022-02-04 PROCEDURE — 83880 ASSAY OF NATRIURETIC PEPTIDE: CPT

## 2022-02-04 PROCEDURE — 71045 X-RAY EXAM CHEST 1 VIEW: CPT

## 2022-02-04 PROCEDURE — 74011250636 HC RX REV CODE- 250/636: Performed by: EMERGENCY MEDICINE

## 2022-02-04 PROCEDURE — 84484 ASSAY OF TROPONIN QUANT: CPT

## 2022-02-04 PROCEDURE — 96375 TX/PRO/DX INJ NEW DRUG ADDON: CPT

## 2022-02-04 PROCEDURE — 96366 THER/PROPH/DIAG IV INF ADDON: CPT

## 2022-02-04 PROCEDURE — 80053 COMPREHEN METABOLIC PANEL: CPT

## 2022-02-04 PROCEDURE — 96365 THER/PROPH/DIAG IV INF INIT: CPT

## 2022-02-04 PROCEDURE — 85025 COMPLETE CBC W/AUTO DIFF WBC: CPT

## 2022-02-04 PROCEDURE — 99284 EMERGENCY DEPT VISIT MOD MDM: CPT

## 2022-02-04 PROCEDURE — 74011000250 HC RX REV CODE- 250: Performed by: EMERGENCY MEDICINE

## 2022-02-04 PROCEDURE — 36415 COLL VENOUS BLD VENIPUNCTURE: CPT

## 2022-02-04 RX ORDER — PREDNISONE 20 MG/1
60 TABLET ORAL DAILY
Qty: 15 TABLET | Refills: 0 | Status: SHIPPED | OUTPATIENT
Start: 2022-02-04 | End: 2022-02-04 | Stop reason: SDUPTHER

## 2022-02-04 RX ORDER — MAGNESIUM SULFATE 1 G/100ML
1 INJECTION INTRAVENOUS ONCE
Status: COMPLETED | OUTPATIENT
Start: 2022-02-04 | End: 2022-02-04

## 2022-02-04 RX ORDER — PREDNISONE 20 MG/1
60 TABLET ORAL DAILY
Qty: 15 TABLET | Refills: 0 | Status: SHIPPED | OUTPATIENT
Start: 2022-02-04 | End: 2022-02-09

## 2022-02-04 RX ORDER — ALBUTEROL SULFATE 90 UG/1
2 AEROSOL, METERED RESPIRATORY (INHALATION)
Qty: 1 EACH | Refills: 0 | Status: SHIPPED | OUTPATIENT
Start: 2022-02-04 | End: 2022-02-04 | Stop reason: SDUPTHER

## 2022-02-04 RX ORDER — IPRATROPIUM BROMIDE AND ALBUTEROL SULFATE 2.5; .5 MG/3ML; MG/3ML
3 SOLUTION RESPIRATORY (INHALATION)
Status: DISPENSED | OUTPATIENT
Start: 2022-02-04 | End: 2022-02-04

## 2022-02-04 RX ORDER — ALBUTEROL SULFATE 90 UG/1
2 AEROSOL, METERED RESPIRATORY (INHALATION)
Qty: 1 EACH | Refills: 0 | Status: ON HOLD | OUTPATIENT
Start: 2022-02-04 | End: 2022-04-20 | Stop reason: SDUPTHER

## 2022-02-04 RX ADMIN — MAGNESIUM SULFATE HEPTAHYDRATE 1 G: 1 INJECTION, SOLUTION INTRAVENOUS at 11:59

## 2022-02-04 RX ADMIN — IPRATROPIUM BROMIDE AND ALBUTEROL SULFATE 3 ML: .5; 3 SOLUTION RESPIRATORY (INHALATION) at 11:40

## 2022-02-04 RX ADMIN — METHYLPREDNISOLONE SODIUM SUCCINATE 125 MG: 125 INJECTION, POWDER, FOR SOLUTION INTRAMUSCULAR; INTRAVENOUS at 11:59

## 2022-02-04 RX ADMIN — IPRATROPIUM BROMIDE AND ALBUTEROL SULFATE 3 ML: .5; 3 SOLUTION RESPIRATORY (INHALATION) at 11:59

## 2022-02-04 NOTE — ED PROVIDER NOTES
EMERGENCY DEPARTMENT HISTORY AND PHYSICAL EXAM      Date: 2/4/2022  Patient Name: Harleen Presley      History of Presenting Illness     Chief Complaint   Patient presents with    Chest Pain       History Provided By: Patient    HPI: Harleen Presley, 64 y.o. female with a past medical history significant COPD presents to the ED with cc of increasing shortness of breath over the past couple of days. She normally does not have an oxygen requirement home but has a 2 L oxygen requirement here. She also says that she last ingested alcohol and smoke crack cocaine this morning. Patient says that she is got some mild tightness in her chest is worse with deep inspiration. She denies any fever, chills, rash, diarrhea, headache, night sweats. No treatment prior to arrival.    There are no other complaints, changes, or physical findings at this time. PCP: None    Current Outpatient Medications   Medication Sig Dispense Refill    albuterol (PROVENTIL HFA, VENTOLIN HFA, PROAIR HFA) 90 mcg/actuation inhaler Take 2 Puffs by inhalation every four (4) hours as needed for Wheezing. 1 Each 0    predniSONE (DELTASONE) 20 mg tablet Take 60 mg by mouth daily for 5 days. 15 Tablet 0    azithromycin (ZITHROMAX) 250 mg tablet Take two tablets today then one tablet daily 6 Tablet 0    Nebulizers misc 3 mL by Does Not Apply route four (4) times daily as needed (sob). 30 Each 0    budesonide-formoteroL (SYMBICORT) 80-4.5 mcg/actuation HFAA Take 2 Puffs by inhalation two (2) times a day. 1 Inhaler 1       Past History     Past Medical History:  Past Medical History:   Diagnosis Date    Asthma     [facility]ch[facility] obstructive [facility]) [address] [address]     Emphysema lung ([facility] [address]        Past Surgical History:  No past surgical history on file. Family History:  No family history on file.     Social History:  Social History     Tobacco Use    Smoking status: Current Every Day Smoker     Packs/day: 1.00     Years: 37.00     Pack years: 37.00  Smokeless tobacco: Never Used    Tobacco comment: 37 years   Substance Use Topics    Alcohol use: Yes     Alcohol/week: 6.0 standard drinks     Types: 6 Cans of beer per week     Comment: daily    Drug use: Yes     Types: Marijuana, Cocaine     Comment: tonight       Allergies:  No Known Allergies      Review of Systems     Review of Systems   Constitutional: Negative. Negative for appetite change, chills, fatigue and fever. HENT: Negative. Negative for congestion and sinus pain. Eyes: Negative. Negative for pain and visual disturbance. Respiratory: Positive for chest tightness and shortness of breath. Cardiovascular: Negative. Negative for chest pain. Gastrointestinal: Negative. Negative for abdominal pain, diarrhea, nausea and vomiting. Genitourinary: Negative. Negative for difficulty urinating. No discharge   Musculoskeletal: Negative. Negative for arthralgias. Skin: Negative. Negative for rash. Neurological: Negative. Negative for weakness and headaches. Hematological: Negative. Psychiatric/Behavioral: Negative. Negative for agitation. The patient is not nervous/anxious. All other systems reviewed and are negative. Physical Exam     Physical Exam  Vitals and nursing note reviewed. Constitutional:       General: She is not in acute distress. Appearance: She is well-developed. HENT:      Head: Normocephalic and atraumatic. Nose: Nose normal.      Mouth/Throat:      Mouth: Mucous membranes are moist.      Pharynx: Oropharynx is clear. No oropharyngeal exudate. Eyes:      General:         Right eye: No discharge. Left eye: No discharge. Conjunctiva/sclera: Conjunctivae normal.      Pupils: Pupils are equal, round, and reactive to light. Cardiovascular:      Rate and Rhythm: Normal rate and regular rhythm. Chest Wall: PMI is not displaced. No thrill. Heart sounds: Normal heart sounds. No murmur heard. No friction rub.  No gallop. Pulmonary:      Effort: Tachypnea and respiratory distress present. Breath sounds: Examination of the right-upper field reveals rhonchi. Examination of the left-upper field reveals rhonchi. Examination of the right-middle field reveals rhonchi. Examination of the left-middle field reveals rhonchi. Examination of the right-lower field reveals rhonchi. Examination of the left-lower field reveals rhonchi. Rhonchi present. No wheezing or rales. Chest:      Chest wall: No tenderness. Abdominal:      General: Bowel sounds are normal. There is no distension. Palpations: Abdomen is soft. There is no mass. Tenderness: There is no abdominal tenderness. There is no guarding or rebound. Musculoskeletal:         General: Normal range of motion. Cervical back: Normal range of motion and neck supple. Lymphadenopathy:      Cervical: No cervical adenopathy. Skin:     General: Skin is warm and dry. Capillary Refill: Capillary refill takes less than 2 seconds. Findings: No erythema or rash. Neurological:      Mental Status: She is alert and oriented to person, place, and time. Cranial Nerves: No cranial nerve deficit. Coordination: Coordination normal.   Psychiatric:         Mood and Affect: Mood normal.         Behavior: Behavior normal.         Lab and Diagnostic Study Results     Labs -     No results found for this or any previous visit (from the past 12 hour(s)). Radiologic Studies -   [unfilled]  CT Results  (Last 48 hours)    None        CXR Results  (Last 48 hours)    None          Medical Decision Making and ED Course   - I am the first and primary provider for this patient AND AM THE PRIMARY PROVIDER OF RECORD. - I reviewed the vital signs, available nursing notes, past medical history, past surgical history, family history and social history. - Initial assessment performed.  The patients presenting problems have been discussed, and the staff are in agreement with the care plan formulated and outlined with them. I have encouraged them to ask questions as they arise throughout their visit. Vital Signs-Reviewed the patient's vital signs. No data found. EKG interpretation: (Preliminary): Performed at [id number], and read at 1050  Ventricular rate 77 bpm,  ms, QRS duration 80 ms,  ms. Rotation: Normal sinus rhythm, normal EKG. Records Reviewed: Nursing Notes    The patient presents with increased work of breathing shortness of breath and coarse breath sounds and substance abuse with a differential diagnosis of ACS, pneumonia, pneumonitis. Will assess with basic cardiac labs EKG and chest x-ray    ED Course:              Provider Notes (Medical Decision Making):   49-year-old female no past medical history significant for COPD presents with acute respiratory distress. Symptoms are completely resolved after albuterol and Atrovent and steroids here patient says she feels much better and does not have any other acute pathology at this point time. Will treat with steroids as an outpatient have her follow-up with her PCP. MDM           Consultations:       Consultations: - NONE        Procedures and Critical Care       Performed by: Elieser Dupont MD  PROCEDURES:  Procedures         Disposition     Disposition: Condition stable    Discharged    Remove if not discharged  DISCHARGE PLAN:  1. Current Discharge Medication List      CONTINUE these medications which have NOT CHANGED    Details   albuterol (PROVENTIL HFA, VENTOLIN HFA, PROAIR HFA) 90 mcg/actuation inhaler Take 2 Puffs by inhalation every four (4) hours as needed for Wheezing. Qty: 1 Each, Refills: 0      albuterol (PROVENTIL VENTOLIN) 2.5 mg /3 mL (0.083 %) nebu 3 mL by Nebulization route every four (4) hours as needed for Wheezing.   Qty: 30 Nebule, Refills: 0      predniSONE (STERAPRED DS) 10 mg dose pack See administration instruction per 10mg dose pack  Qty: 21 Tablet, Refills: 0      azithromycin (ZITHROMAX) 250 mg tablet Take two tablets today then one tablet daily  Qty: 6 Tablet, Refills: 0      Nebulizers misc 3 mL by Does Not Apply route four (4) times daily as needed (sob). Qty: 30 Each, Refills: 0      albuterol sulfate (PROAIR RESPICLICK) 90 mcg/actuation breath activated inhaler Take 2 Puffs by inhalation every six (6) hours as needed for Wheezing. Qty: 1 Inhaler, Refills: 1      budesonide-formoteroL (SYMBICORT) 80-4.5 mcg/actuation HFAA Take 2 Puffs by inhalation two (2) times a day. Qty: 1 Inhaler, Refills: 1           2. Follow-up Information     Follow up With Specialties Details Why [address] [facility] EMERGENCY DEPT Emergency Medicine Call in 2 days As needed, If symptoms worsen [address] [facility] [address] [phone]        3. Return to ED if worse   4. Discharge Medication List as of 2/4/2022  3:07 PM      START taking these medications    Details   predniSONE (DELTASONE) 20 mg tablet Take 60 mg by mouth daily for 5 days. , Normal, Disp-15 Tablet, R-0      albuterol (PROVENTIL HFA, VENTOLIN HFA, PROAIR HFA) 90 mcg/actuation inhaler Take 2 Puffs by inhalation every four (4) hours as needed for Wheezing., Normal, Disp-1 Each, R-0         CONTINUE these medications which have NOT CHANGED    Details   azithromycin (ZITHROMAX) 250 mg tablet Take two tablets today then one tablet daily, Normal, Disp-6 Tablet, R-0      Nebulizers misc 3 mL by Does Not Apply route four (4) times daily as needed (sob). , Normal, Disp-30 Each,R-0      budesonide-formoteroL (SYMBICORT) 80-4.5 mcg/actuation HFAA Take 2 Puffs by inhalation two (2) times a day., Normal, Disp-1 Inhaler,R-1             Diagnosis     Clinical Impression:   1. Acute exacerbation of chronic obstructive pulmonary disease (COPD) ([address]        Attestations:    Leighann Crouch MD    Please note that this dictation was completed with [employer] the [employer] voice recognition software.   Quite often unanticipated grammatical, syntax, homophones, and other interpretive errors are inadvertently transcribed by the computer software. Please disregard these errors. Please excuse any errors that have escaped final proofreading. Thank you.

## 2022-02-04 NOTE — ED TRIAGE NOTES
Pt to ed via ems from home. Pt with chest pain that began yesterday. Admits to cocaine use yesterday. Also reports cough. Pain worse with palpation.

## 2022-02-22 ENCOUNTER — HOSPITAL ENCOUNTER (EMERGENCY)
Age: 57
Discharge: HOME | End: 2022-02-22
Attending: STUDENT IN AN ORGANIZED HEALTH CARE EDUCATION/TRAINING PROGRAM
Payer: MEDICAID

## 2022-02-22 ENCOUNTER — APPOINTMENT (OUTPATIENT)
Dept: GENERAL RADIOLOGY | Age: 57
End: 2022-02-22
Attending: STUDENT IN AN ORGANIZED HEALTH CARE EDUCATION/TRAINING PROGRAM
Payer: MEDICAID

## 2022-02-22 VITALS
HEART RATE: 70 BPM | WEIGHT: 180 LBS | RESPIRATION RATE: 20 BRPM | HEIGHT: 61 IN | OXYGEN SATURATION: 94 % | TEMPERATURE: 97.9 F | DIASTOLIC BLOOD PRESSURE: 94 MMHG | BODY MASS INDEX: 33.99 KG/M2 | SYSTOLIC BLOOD PRESSURE: 134 MMHG

## 2022-02-22 DIAGNOSIS — K29.90 GASTRITIS AND DUODENITIS: Primary | ICD-10-CM

## 2022-02-22 LAB
ALBUMIN SERPL-MCNC: 3.4 G/DL (ref 3.5–5)
ALBUMIN/GLOB SERPL: 1 {RATIO} (ref 1.1–2.2)
ALP SERPL-CCNC: 74 U/L (ref 45–117)
ALT SERPL-CCNC: 14 U/L (ref 12–78)
ANION GAP SERPL CALC-SCNC: 3 MMOL/L (ref 5–15)
AST SERPL W P-5'-P-CCNC: 13 U/L (ref 15–37)
BASOPHILS # BLD: 0 K/UL (ref 0–0.1)
BASOPHILS NFR BLD: 0 % (ref 0–1)
BILIRUB SERPL-MCNC: 0.9 MG/DL (ref 0.2–1)
BUN SERPL-MCNC: 6 MG/DL (ref 6–20)
BUN/CREAT SERPL: 12 (ref 12–20)
CA-I BLD-MCNC: 8.7 MG/DL (ref 8.5–10.1)
CHLORIDE SERPL-SCNC: 108 MMOL/L (ref 97–108)
CO2 SERPL-SCNC: 32 MMOL/L (ref 21–32)
CREAT SERPL-MCNC: 0.5 MG/DL (ref 0.55–1.02)
DIFFERENTIAL METHOD BLD: ABNORMAL
EOSINOPHIL # BLD: 0.1 K/UL (ref 0–0.4)
EOSINOPHIL NFR BLD: 1 % (ref 0–7)
ERYTHROCYTE [DISTWIDTH] IN BLOOD BY AUTOMATED COUNT: 16.1 % (ref 11.5–14.5)
GLOBULIN SER CALC-MCNC: 3.3 G/DL (ref 2–4)
GLUCOSE SERPL-MCNC: 94 MG/DL (ref 65–100)
HCT VFR BLD AUTO: 41.7 % (ref 35–47)
HGB BLD-MCNC: 13.2 G/DL (ref 11.5–16)
IMM GRANULOCYTES # BLD AUTO: 0 K/UL (ref 0–0.04)
IMM GRANULOCYTES NFR BLD AUTO: 0 % (ref 0–0.5)
LIPASE SERPL-CCNC: 44 U/L (ref 73–393)
LYMPHOCYTES # BLD: 2.2 K/UL (ref 0.8–3.5)
LYMPHOCYTES NFR BLD: 33 % (ref 12–49)
MAGNESIUM SERPL-MCNC: 1.9 MG/DL (ref 1.6–2.4)
MCH RBC QN AUTO: 29.4 PG (ref 26–34)
MCHC RBC AUTO-ENTMCNC: 31.7 G/DL (ref 30–36.5)
MCV RBC AUTO: 92.9 FL (ref 80–99)
MONOCYTES # BLD: 0.6 K/UL (ref 0–1)
MONOCYTES NFR BLD: 8 % (ref 5–13)
NEUTS SEG # BLD: 3.7 K/UL (ref 1.8–8)
NEUTS SEG NFR BLD: 58 % (ref 32–75)
NRBC # BLD: 0 K/UL (ref 0–0.01)
NRBC BLD-RTO: 0 PER 100 WBC
PLATELET # BLD AUTO: 231 K/UL (ref 150–400)
PMV BLD AUTO: 10.8 FL (ref 8.9–12.9)
POTASSIUM SERPL-SCNC: 3.2 MMOL/L (ref 3.5–5.1)
PROT SERPL-MCNC: 6.7 G/DL (ref 6.4–8.2)
RBC # BLD AUTO: 4.49 M/UL (ref 3.8–5.2)
SODIUM SERPL-SCNC: 143 MMOL/L (ref 136–145)
WBC # BLD AUTO: 6.6 K/UL (ref 3.6–11)

## 2022-02-22 PROCEDURE — 83735 ASSAY OF MAGNESIUM: CPT

## 2022-02-22 PROCEDURE — 99284 EMERGENCY DEPT VISIT MOD MDM: CPT

## 2022-02-22 PROCEDURE — 74011250636 HC RX REV CODE- 250/636: Performed by: STUDENT IN AN ORGANIZED HEALTH CARE EDUCATION/TRAINING PROGRAM

## 2022-02-22 PROCEDURE — 74011000250 HC RX REV CODE- 250: Performed by: STUDENT IN AN ORGANIZED HEALTH CARE EDUCATION/TRAINING PROGRAM

## 2022-02-22 PROCEDURE — 36415 COLL VENOUS BLD VENIPUNCTURE: CPT

## 2022-02-22 PROCEDURE — 74011250637 HC RX REV CODE- 250/637: Performed by: STUDENT IN AN ORGANIZED HEALTH CARE EDUCATION/TRAINING PROGRAM

## 2022-02-22 PROCEDURE — 74011636637 HC RX REV CODE- 636/637: Performed by: STUDENT IN AN ORGANIZED HEALTH CARE EDUCATION/TRAINING PROGRAM

## 2022-02-22 PROCEDURE — 94640 AIRWAY INHALATION TREATMENT: CPT

## 2022-02-22 PROCEDURE — 80053 COMPREHEN METABOLIC PANEL: CPT

## 2022-02-22 PROCEDURE — 85025 COMPLETE CBC W/AUTO DIFF WBC: CPT

## 2022-02-22 PROCEDURE — 71045 X-RAY EXAM CHEST 1 VIEW: CPT

## 2022-02-22 PROCEDURE — 83690 ASSAY OF LIPASE: CPT

## 2022-02-22 PROCEDURE — 96374 THER/PROPH/DIAG INJ IV PUSH: CPT

## 2022-02-22 RX ORDER — FAMOTIDINE 20 MG/1
20 TABLET, FILM COATED ORAL 2 TIMES DAILY
Qty: 20 TABLET | Refills: 0 | Status: SHIPPED | OUTPATIENT
Start: 2022-02-22 | End: 2022-03-04

## 2022-02-22 RX ORDER — MAG HYDROX/ALUMINUM HYD/SIMETH 200-200-20
30 SUSPENSION, ORAL (FINAL DOSE FORM) ORAL ONCE
Status: COMPLETED | OUTPATIENT
Start: 2022-02-22 | End: 2022-02-22

## 2022-02-22 RX ORDER — PREDNISONE 20 MG/1
40 TABLET ORAL ONCE
Status: COMPLETED | OUTPATIENT
Start: 2022-02-22 | End: 2022-02-22

## 2022-02-22 RX ORDER — LIDOCAINE HYDROCHLORIDE 20 MG/ML
15 SOLUTION OROPHARYNGEAL
Status: COMPLETED | OUTPATIENT
Start: 2022-02-22 | End: 2022-02-22

## 2022-02-22 RX ORDER — ONDANSETRON 4 MG/1
4 TABLET, FILM COATED ORAL
Qty: 20 TABLET | Refills: 0 | Status: SHIPPED | OUTPATIENT
Start: 2022-02-22 | End: 2022-04-20

## 2022-02-22 RX ORDER — IPRATROPIUM BROMIDE AND ALBUTEROL SULFATE 2.5; .5 MG/3ML; MG/3ML
3 SOLUTION RESPIRATORY (INHALATION)
Status: COMPLETED | OUTPATIENT
Start: 2022-02-22 | End: 2022-02-22

## 2022-02-22 RX ADMIN — PREDNISONE 40 MG: 20 TABLET ORAL at 18:34

## 2022-02-22 RX ADMIN — LIDOCAINE HYDROCHLORIDE 15 ML: 20 SOLUTION ORAL; TOPICAL at 18:35

## 2022-02-22 RX ADMIN — IPRATROPIUM BROMIDE AND ALBUTEROL SULFATE 3 ML: .5; 3 SOLUTION RESPIRATORY (INHALATION) at 18:41

## 2022-02-22 RX ADMIN — ALUMINUM HYDROXIDE, MAGNESIUM HYDROXIDE, AND SIMETHICONE 30 ML: 200; 200; 20 SUSPENSION ORAL at 18:35

## 2022-02-22 RX ADMIN — SODIUM CHLORIDE, PRESERVATIVE FREE 20 MG: 5 INJECTION INTRAVENOUS at 18:34

## 2022-02-22 RX ADMIN — SODIUM CHLORIDE 500 ML: 9 INJECTION, SOLUTION INTRAVENOUS at 18:40

## 2022-02-22 NOTE — ED TRIAGE NOTES
Pt arrives with abdominal pain r/t alcohol use. Reports feeling tremulous. Hx pancreatitis. Pt is also homeless. Denies N/V/D. Denies SI/HI/AVH.

## 2022-02-22 NOTE — ED PROVIDER NOTES
[facility]  EMERGENCY DEPARTMENT ENCOUNTER NOTE    Date: 2/22/2022  Patient Name: Mikal Fitzgerald    History of Presenting Illness     Chief Complaint   Patient presents with    Alcohol Problem    Abdominal Pain     HPI: Mikal Fitzgerald, 64 y.o. female with A past medical history of COPD, asthma, homelessness, and cocaine and EtOH use presents for epigastric pain. Patient reports drinking alcohol and using cocaine yesterday. She reports epigastric pain that she relates to using substances and she reports that she has had this pain in the past.  She currently has no nausea or vomiting but reports epigastric pain. No chest pain or shortness of breath. No cough. She reports wheezing and has been out of her inhalers. No fevers or chills. No chest pain. Medical History   I reviewed the medical, surgical, family, and social history, as well as allergies:    PCP: None    Past Medical History:  Past Medical History:   Diagnosis Date    Asthma     [facility]chronic obstructive [facility]) [facility] [address]     Emphysema lung ([facility] [address]      Past Surgical History:  No past surgical history on file. Current Outpatient Medications:  Current Outpatient Medications   Medication Instructions    albuterol (PROVENTIL HFA, VENTOLIN HFA, PROAIR HFA) 90 mcg/actuation inhaler 2 Puffs, Inhalation, EVERY 4 HOURS AS NEEDED    azithromycin (ZITHROMAX) 250 mg tablet Take two tablets today then one tablet daily    budesonide-formoteroL (SYMBICORT) 80-4.5 mcg/actuation HFAA 2 Puffs, Inhalation, 2 TIMES DAILY    famotidine (PEPCID) 20 mg, Oral, 2 TIMES DAILY    Nebulizers misc 3 mL, Does Not Apply, 4 TIMES DAILY AS NEEDED    ondansetron hcl (ZOFRAN) 4 mg, Oral, EVERY 8 HOURS AS NEEDED      Family History:  History reviewed. No pertinent family history.   Social History:  Social History     Tobacco Use    Smoking status: Current Every Day Smoker     Packs/day: 1.00     Years: 37.00     Pack years: 37.00  Smokeless tobacco: Never Used    Tobacco comment: 37 years   Substance Use Topics    Alcohol use: Yes     Alcohol/week: 6.0 standard drinks     Types: 6 Cans of beer per week     Comment: daily    Drug use: Yes     Types: Marijuana, Cocaine     Comment: tonight     Allergies:  No Known Allergies    Review of Systems     Review of Systems  Negative: All other systems negative. Physical Exam and Vital Signs   Vital Signs - Reviewed the patient's vital signs. Patient Vitals for the past 12 hrs:   Temp Pulse Resp BP SpO2   02/22/22 1945 97.9 °F (36.6 °C) 74 16 131/82 94 %   02/22/22 1852  69 18 (!) 134/91 97 %   02/22/22 1841     98 %   02/22/22 1702 98.2 °F (36.8 °C) 88 16 (!) 154/94 98 %   02/22/22 1700     98 %     Physical Exam:    GENERAL: awake, alert, cooperative, not in distress  HEENT:  * Pupils equal, EOMI  * Head atraumatic  CV:  * regular rhythm  * warm and perfused extremities bilaterally  PULMONARY: Good air movement, noted wheezes  ABDOMEN/: soft, no distension, no guarding, no suprapubic tenderness, no abdominal tenderness  EXTREMITIES/BACK: warm and perfused, no tenderness, no edema  SKIN: no rashes or signs of trauma  NEURO:  * Speech clear  * Moves U&LE to command    Medical Decision Making   - I am the first and primary provider for this patient and am the primary provider of record. - I reviewed the vital signs, available nursing notes, past medical history, past surgical history, family history and social history. - Initial assessment performed. The patients presenting problems have been discussed, and the staff are in agreement with the care plan formulated and outlined with them. I have encouraged them to ask questions as they arise throughout their visit. - Available medical records, nursing notes, old EKGs, and EMS run sheets (if patient was EMS transported) were reviewed    MDM:   Patient is a 64 y.o. female presenting for epigastric pain.  Vitals reveal no significant abnormalities and physical exam reveals wheezing. Based on the history, physical exam, risk factors, and vitals signs, differential includes: Mild COPD, mild asthma, gastritis, pancreatitis, abnormalities, ANDRE, titration. Will initiate workup and symptomatic treatment. See ED Course and Reassessment for results and interpretations. Results     Labs:  Recent Results (from the past 12 hour(s))   CBC WITH AUTOMATED DIFF    Collection Time: 02/22/22  6:30 PM   Result Value Ref Range    WBC 6.6 3.6 - 11.0 K/uL    RBC 4.49 3.80 - 5.20 M/uL    HGB 13.2 11.5 - 16.0 g/dL    HCT 41.7 35.0 - 47.0 %    MCV 92.9 80.0 - 99.0 FL    MCH 29.4 26.0 - 34.0 PG    MCHC 31.7 30.0 - 36.5 g/dL    RDW 16.1 (H) 11.5 - 14.5 %    PLATELET 932 814 - 610 K/uL    MPV 10.8 8.9 - 12.9 FL    NRBC 0.0 0.0  WBC    ABSOLUTE NRBC 0.00 0.00 - 0.01 K/uL    NEUTROPHILS 58 32 - 75 %    LYMPHOCYTES 33 12 - 49 %    MONOCYTES 8 5 - 13 %    EOSINOPHILS 1 0 - 7 %    BASOPHILS 0 0 - 1 %    IMMATURE GRANULOCYTES 0 0 - 0.5 %    ABS. NEUTROPHILS 3.7 1.8 - 8.0 K/UL    ABS. LYMPHOCYTES 2.2 0.8 - 3.5 K/UL    ABS. MONOCYTES 0.6 0.0 - 1.0 K/UL    ABS. EOSINOPHILS 0.1 0.0 - 0.4 K/UL    ABS. BASOPHILS 0.0 0.0 - 0.1 K/UL    ABS. IMM. GRANS. 0.0 0.00 - 0.04 K/UL    DF AUTOMATED     METABOLIC PANEL, COMPREHENSIVE    Collection Time: 02/22/22  6:30 PM   Result Value Ref Range    Sodium 143 136 - 145 mmol/L    Potassium 3.2 (L) 3.5 - 5.1 mmol/L    Chloride 108 97 - 108 mmol/L    CO2 32 21 - 32 mmol/L    Anion gap 3 (L) 5 - 15 mmol/L    Glucose 94 65 - 100 mg/dL    BUN 6 6 - 20 mg/dL    Creatinine 0.50 (L) 0.55 - 1.02 mg/dL    BUN/Creatinine ratio 12 12 - 20      GFR est AA >60 >60 ml/min/1.73m2    GFR est non-AA >60 >60 ml/min/1.73m2    Calcium 8.7 8.5 - 10.1 mg/dL    Bilirubin, total 0.9 0.2 - 1.0 mg/dL    AST (SGOT) 13 (L) 15 - 37 U/L    ALT (SGPT) 14 12 - 78 U/L    Alk.  phosphatase 74 45 - 117 U/L    Protein, total 6.7 6.4 - 8.2 g/dL    Albumin 3.4 (L) 3.5 - 5.0 g/dL    Globulin 3.3 2.0 - 4.0 g/dL    A-G Ratio 1.0 (L) 1.1 - 2.2     LIPASE    Collection Time: 02/22/22  6:30 PM   Result Value Ref Range    Lipase 44 (L) 73 - 393 U/L   MAGNESIUM    Collection Time: 02/22/22  6:30 PM   Result Value Ref Range    Magnesium 1.9 1.6 - 2.4 mg/dL     Radiologic Studies:  CT Results  (Last 48 hours)    None        CXR Results  (Last 48 hours)               02/22/22 1754  XR CHEST PORT Final result    Impression:  No acute findings. Narrative:  Shortness of breath. Comparison chest x-ray 2/4/2022. Findings: Single frontal view of the chest. Normal cardiomediastinal silhouette. No vascular congestion or pulmonary edema. The lungs are well-inflated. No   infiltrate, effusion, pneumothorax. No free air under the hemidiaphragms. Bones   grossly intact. Medications ordered:  Medications   albuterol-ipratropium (DUO-NEB) 2.5 MG-0.5 MG/3 ML (3 mL Nebulization Given 2/22/22 1841)   alum-mag hydroxide-simeth (MYLANTA) oral suspension 30 mL (30 mL Oral Given 2/22/22 1835)   lidocaine (XYLOCAINE) 2 % viscous solution 15 mL (15 mL Mouth/Throat Given 2/22/22 1835)   sodium chloride 0.9 % bolus infusion 500 mL (0 mL IntraVENous IV Completed 2/22/22 1940)   predniSONE (DELTASONE) tablet 40 mg (40 mg Oral Given 2/22/22 1834)   famotidine (PF) (PEPCID) 20 mg in 0.9% sodium chloride 10 mL injection (20 mg IntraVENous Given 2/22/22 1834)     ED Course and Reassessment     ED Course:     ED Course as of 02/22/22 2053 Tue Feb 22, 2022 1837 Chest x-ray negative for acute pathology: no concern for pulmonary edema, pleural effusion, pneumothorax, or pneumonia. [SS]   1930 CBC does not show any evidence of acute process. Leukocytosis not present to suggest infection. Hemoglobin not suggestive of acute anemia. Platelet count is normal.    No significant electrolyte derangements. Creatinine is not elevated more than baseline range making ANDRE unlikely.  No significant transaminitis noted. Normal bilirubin. Magnesium within normal limits. Lipase is not significantly elevated. [SS]      ED Course User Index  [SS] Vita Rojas MD       Reassessment:    The patient presents with nausea and vomiting. Based on the patient's clinical picture at this time and the evaluation as detailed above, I see no evidence for more malignant underlying processes. There is no significant evidence for significant dehydration requiring admission. Nausea and vomiting have improved during ED stay and patient tolerated PO intake. The possibility of more malignant occult pathology was discussed with the patient. The patient understands the need to return promptly with any worsening symptoms or changes, and that no emergency department workup, no matter how extensive, can definitely exclude some more serious intra-abdominal and pelvic processes early in the disease course. After completion of workup and discussion of results and diagnoses with the patient, all the patient's questions were answered. If required, all follow up appointments and treatments were discussed and explained. The patient sounded understanding and agrees with the plan. The patient understands that at this time there is no evidence for a more malignant underlying process, but the patient also understands that early in the process of an illness, an emergency department workup can be falsely reassuring. Routine discharge counseling was given to the patient and the patient understands that worsening, changing or persistent symptoms should prompt an immediate call or follow up with their primary physician or the emergency department. The importance of appropriate follow up was also discussed with the patient. More extensive discharge instructions were given in the patient's discharge paperwork.       Final Disposition     Discharge: DISCHARGED FROM EMERGENCY DEPARTMENT    Patient will be discharged from the Emergency Department in stable condition. All of the diagnostic tests were reviewed and any questions were answered. Diagnosis, results, follow up if applicable, and return precautions were discussed. I have also put together printed discharge instructions for them that include: 1) educational information regarding their diagnosis, 2) how to care for their diagnosis at home, as well a 3) list of reasons why they would want to return to the ED prior to their follow-up appointment, should their condition change. Any labs or imaging done in the ED will be either printed with the discharge paperwork or available through [id number] DISCHARGE PLAN:  1. Current Discharge Medication List      START taking these medications    Details   famotidine (Pepcid) 20 mg tablet Take 1 Tablet by mouth two (2) times a day for 10 days. Qty: 20 Tablet, Refills: 0      ondansetron hcl (Zofran) 4 mg tablet Take 1 Tablet by mouth every eight (8) hours as needed for Nausea or Vomiting. Qty: 20 Tablet, Refills: 0         CONTINUE these medications which have NOT CHANGED    Details   albuterol (PROVENTIL HFA, VENTOLIN HFA, PROAIR HFA) 90 mcg/actuation inhaler Take 2 Puffs by inhalation every four (4) hours as needed for Wheezing. Qty: 1 Each, Refills: 0      azithromycin (ZITHROMAX) 250 mg tablet Take two tablets today then one tablet daily  Qty: 6 Tablet, Refills: 0      Nebulizers misc 3 mL by Does Not Apply route four (4) times daily as needed (sob). Qty: 30 Each, Refills: 0      budesonide-formoteroL (SYMBICORT) 80-4.5 mcg/actuation HFAA Take 2 Puffs by inhalation two (2) times a day. Qty: 1 Inhaler, Refills: 1            2.   Follow-up Information     Follow up With Specialties Details Why [address] [facility]    [address] [facility] [address] EMERGENCY DEPT Emergency Medicine Go to  If symptoms worsen [address] [facility] [address] [phone]    Your doctor  Schedule an appointment as soon as possible for a visit in 2 days          3.   Return to ED if worse    4. Current Discharge Medication List      START taking these medications    Details   famotidine (Pepcid) 20 mg tablet Take 1 Tablet by mouth two (2) times a day for 10 days. Qty: 20 Tablet, Refills: 0  Start date: 2/22/2022, End date: 3/4/2022      ondansetron hcl (Zofran) 4 mg tablet Take 1 Tablet by mouth every eight (8) hours as needed for Nausea or Vomiting. Qty: 20 Tablet, Refills: 0  Start date: 2/22/2022             Diagnosis     Clinical Impression:   1. Gastritis and duodenitis      Attestations:    Ab Patel MD    Please note that this dictation was completed with [email or web address], the computer voice recognition software. Quite often unanticipated grammatical, syntax, homophones, and other interpretive errors are inadvertently transcribed by the computer software. Please disregard these errors. Please excuse any errors that have escaped final proofreading. Thank you.

## 2022-02-23 ENCOUNTER — HOSPITAL ENCOUNTER (EMERGENCY)
Age: 57
Discharge: LEFT BEFORE DISCHARGE | End: 2022-02-23
Admitting: EMERGENCY MEDICINE
Payer: MEDICAID

## 2022-02-23 PROCEDURE — 75810000275 HC EMERGENCY DEPT VISIT NO LEVEL OF CARE

## 2022-02-23 NOTE — ED NOTES
After pt was discharged. Pt requesting resources for alcohol and drug abuse. Behavioral healh made aware. Behavioral health intake to bedside and spoke with pt. Pt now reports suicidal/homicidal thoughts to intake nurse. Zully Garcia

## 2022-02-23 NOTE — DISCHARGE INSTRUCTIONS
Thank you! Thank you for allowing me to care for you in the emergency department. I sincerely hope that you are satisfied with your visit today. It is my goal to provide you with excellent care. Below you will find a list of your labs and imaging from your visit today. Should you have any questions regarding these results please do not hesitate to call the emergency department. Labs -     Recent Results (from the past 12 hour(s))   CBC WITH AUTOMATED DIFF    Collection Time: 02/22/22  6:30 PM   Result Value Ref Range    WBC 6.6 3.6 - 11.0 K/uL    RBC 4.49 3.80 - 5.20 M/uL    HGB 13.2 11.5 - 16.0 g/dL    HCT 41.7 35.0 - 47.0 %    MCV 92.9 80.0 - 99.0 FL    MCH 29.4 26.0 - 34.0 PG    MCHC 31.7 30.0 - 36.5 g/dL    RDW 16.1 (H) 11.5 - 14.5 %    PLATELET 880 184 - 733 K/uL    MPV 10.8 8.9 - 12.9 FL    NRBC 0.0 0.0  WBC    ABSOLUTE NRBC 0.00 0.00 - 0.01 K/uL    NEUTROPHILS 58 32 - 75 %    LYMPHOCYTES 33 12 - 49 %    MONOCYTES 8 5 - 13 %    EOSINOPHILS 1 0 - 7 %    BASOPHILS 0 0 - 1 %    IMMATURE GRANULOCYTES 0 0 - 0.5 %    ABS. NEUTROPHILS 3.7 1.8 - 8.0 K/UL    ABS. LYMPHOCYTES 2.2 0.8 - 3.5 K/UL    ABS. MONOCYTES 0.6 0.0 - 1.0 K/UL    ABS. EOSINOPHILS 0.1 0.0 - 0.4 K/UL    ABS. BASOPHILS 0.0 0.0 - 0.1 K/UL    ABS. IMM. GRANS. 0.0 0.00 - 0.04 K/UL    DF AUTOMATED     METABOLIC PANEL, COMPREHENSIVE    Collection Time: 02/22/22  6:30 PM   Result Value Ref Range    Sodium 143 136 - 145 mmol/L    Potassium 3.2 (L) 3.5 - 5.1 mmol/L    Chloride 108 97 - 108 mmol/L    CO2 32 21 - 32 mmol/L    Anion gap 3 (L) 5 - 15 mmol/L    Glucose 94 65 - 100 mg/dL    BUN 6 6 - 20 mg/dL    Creatinine 0.50 (L) 0.55 - 1.02 mg/dL    BUN/Creatinine ratio 12 12 - 20      GFR est AA >60 >60 ml/min/1.73m2    GFR est non-AA >60 >60 ml/min/1.73m2    Calcium 8.7 8.5 - 10.1 mg/dL    Bilirubin, total 0.9 0.2 - 1.0 mg/dL    AST (SGOT) 13 (L) 15 - 37 U/L    ALT (SGPT) 14 12 - 78 U/L    Alk.  phosphatase 74 45 - 117 U/L    Protein, total 6.7 6.4 - 8.2 g/dL    Albumin 3.4 (L) 3.5 - 5.0 g/dL    Globulin 3.3 2.0 - 4.0 g/dL    A-G Ratio 1.0 (L) 1.1 - 2.2     LIPASE    Collection Time: 02/22/22  6:30 PM   Result Value Ref Range    Lipase 44 (L) 73 - 393 U/L   MAGNESIUM    Collection Time: 02/22/22  6:30 PM   Result Value Ref Range    Magnesium 1.9 1.6 - 2.4 mg/dL       Radiologic Studies -   XR CHEST PORT   Final Result   No acute findings. CT Results  (Last 48 hours)      None          CXR Results  (Last 48 hours)                 02/22/22 1754  XR CHEST PORT Final result    Impression:  No acute findings. Narrative:  Shortness of breath. Comparison chest x-ray 2/4/2022. Findings: Single frontal view of the chest. Normal cardiomediastinal silhouette. No vascular congestion or pulmonary edema. The lungs are well-inflated. No   infiltrate, effusion, pneumothorax. No free air under the hemidiaphragms. Bones   grossly intact. If you feel that you have not received excellent quality care or timely care, please ask to speak to the nurse manager. Please choose us in the future for your continued health care needs. ------------------------------------------------------------------------------------------------------------  The exam and treatment you received in the Emergency Department were for an urgent problem and are not intended as complete care. It is important that you follow-up with a doctor, nurse practitioner, or physician assistant to:  (1) confirm your diagnosis,  (2) re-evaluation of changes in your illness and treatment, and  (3) for ongoing care. If your symptoms become worse or you do not improve as expected and you are unable to reach your usual health care provider, you should return to the Emergency Department. We are available 24 hours a day. Please take your discharge instructions with you when you go to your follow-up appointment.      If a prescription has been provided, please have it filled as soon as possible to prevent a delay in treatment. Read the entire medication instruction sheet provided to you by the pharmacy. If you have any questions or reservations about taking the medication due to side effects or interactions with other medications, please call your primary care physician or contact the ER to speak with the charge nurse. Make an appointment with your family doctor or the physician you were referred to for follow-up of this visit as instructed on your discharge paperwork, as this is a mandatory follow-up. Return to the ER if you are unable to be seen or if you are unable to be seen in a timely manner. If you have any problem arranging the follow-up visit, contact the Emergency Department immediately.

## 2022-03-18 PROBLEM — J44.1 COPD EXACERBATION (HCC): Status: ACTIVE | Noted: 2020-07-05

## 2022-04-15 ENCOUNTER — HOSPITAL ENCOUNTER (INPATIENT)
Age: 57
LOS: 5 days | Discharge: HOME | DRG: 751 | End: 2022-04-20
Attending: EMERGENCY MEDICINE | Admitting: PSYCHIATRY & NEUROLOGY
Payer: MEDICAID

## 2022-04-15 DIAGNOSIS — F33.1 MODERATE EPISODE OF RECURRENT MAJOR DEPRESSIVE DISORDER (HCC): ICD-10-CM

## 2022-04-15 DIAGNOSIS — F43.23 ADJUSTMENT DISORDER WITH MIXED ANXIETY AND DEPRESSED MOOD: ICD-10-CM

## 2022-04-15 DIAGNOSIS — F14.10 COCAINE USE DISORDER, MILD, ABUSE (HCC): ICD-10-CM

## 2022-04-15 DIAGNOSIS — F41.8 ANXIETY ASSOCIATED WITH DEPRESSION: Primary | ICD-10-CM

## 2022-04-15 DIAGNOSIS — F10.10 ALCOHOL USE DISORDER, MILD, ABUSE: ICD-10-CM

## 2022-04-15 PROBLEM — F32.9 MAJOR DEPRESSION: Status: ACTIVE | Noted: 2022-04-15

## 2022-04-15 LAB
ALBUMIN SERPL-MCNC: 3.4 G/DL (ref 3.5–5)
ALBUMIN/GLOB SERPL: 1 {RATIO} (ref 1.1–2.2)
ALP SERPL-CCNC: 68 U/L (ref 45–117)
ALT SERPL-CCNC: 18 U/L (ref 12–78)
AMPHET UR QL SCN: NEGATIVE
ANION GAP SERPL CALC-SCNC: 10 MMOL/L (ref 5–15)
APPEARANCE UR: CLEAR
AST SERPL-CCNC: 11 U/L (ref 15–37)
BACTERIA URNS QL MICRO: NEGATIVE /HPF
BARBITURATES UR QL SCN: NEGATIVE
BASOPHILS # BLD: 0 K/UL (ref 0–0.1)
BASOPHILS NFR BLD: 0 % (ref 0–1)
BENZODIAZ UR QL: NEGATIVE
BILIRUB SERPL-MCNC: 0.7 MG/DL (ref 0.2–1)
BILIRUB UR QL CFM: NEGATIVE
BUN SERPL-MCNC: 10 MG/DL (ref 6–20)
BUN/CREAT SERPL: 13 (ref 12–20)
CALCIUM SERPL-MCNC: 8.4 MG/DL (ref 8.5–10.1)
CANNABINOIDS UR QL SCN: POSITIVE
CHLORIDE SERPL-SCNC: 105 MMOL/L (ref 97–108)
CO2 SERPL-SCNC: 29 MMOL/L (ref 21–32)
COCAINE UR QL SCN: NEGATIVE
COLOR UR: ABNORMAL
CREAT SERPL-MCNC: 0.78 MG/DL (ref 0.55–1.02)
DIFFERENTIAL METHOD BLD: ABNORMAL
DRUG SCRN COMMENT,DRGCM: ABNORMAL
EOSINOPHIL # BLD: 0.1 K/UL (ref 0–0.4)
EOSINOPHIL NFR BLD: 1 % (ref 0–7)
EPITH CASTS URNS QL MICRO: ABNORMAL /LPF
ERYTHROCYTE [DISTWIDTH] IN BLOOD BY AUTOMATED COUNT: 15.6 % (ref 11.5–14.5)
ETHANOL SERPL-MCNC: <10 MG/DL
FLUAV RNA SPEC QL NAA+PROBE: NOT DETECTED
FLUBV RNA SPEC QL NAA+PROBE: NOT DETECTED
GLOBULIN SER CALC-MCNC: 3.3 G/DL (ref 2–4)
GLUCOSE SERPL-MCNC: 198 MG/DL (ref 65–100)
GLUCOSE UR STRIP.AUTO-MCNC: NEGATIVE MG/DL
HCT VFR BLD AUTO: 38.6 % (ref 35–47)
HGB BLD-MCNC: 12.1 G/DL (ref 11.5–16)
HGB UR QL STRIP: NEGATIVE
IMM GRANULOCYTES # BLD AUTO: 0 K/UL (ref 0–0.04)
IMM GRANULOCYTES NFR BLD AUTO: 0 % (ref 0–0.5)
KETONES UR QL STRIP.AUTO: 15 MG/DL
LEUKOCYTE ESTERASE UR QL STRIP.AUTO: NEGATIVE
LYMPHOCYTES # BLD: 2.3 K/UL (ref 0.8–3.5)
LYMPHOCYTES NFR BLD: 30 % (ref 12–49)
MCH RBC QN AUTO: 28.1 PG (ref 26–34)
MCHC RBC AUTO-ENTMCNC: 31.3 G/DL (ref 30–36.5)
MCV RBC AUTO: 89.6 FL (ref 80–99)
METHADONE UR QL: NEGATIVE
MONOCYTES # BLD: 0.6 K/UL (ref 0–1)
MONOCYTES NFR BLD: 8 % (ref 5–13)
NEUTS SEG # BLD: 4.7 K/UL (ref 1.8–8)
NEUTS SEG NFR BLD: 61 % (ref 32–75)
NITRITE UR QL STRIP.AUTO: NEGATIVE
NRBC # BLD: 0 K/UL (ref 0–0.01)
NRBC BLD-RTO: 0 PER 100 WBC
OPIATES UR QL: NEGATIVE
PCP UR QL: NEGATIVE
PH UR STRIP: 5.5 [PH] (ref 5–8)
PLATELET # BLD AUTO: 281 K/UL (ref 150–400)
PMV BLD AUTO: 10.6 FL (ref 8.9–12.9)
POTASSIUM SERPL-SCNC: 3.2 MMOL/L (ref 3.5–5.1)
PROT SERPL-MCNC: 6.7 G/DL (ref 6.4–8.2)
PROT UR STRIP-MCNC: NEGATIVE MG/DL
RBC # BLD AUTO: 4.31 M/UL (ref 3.8–5.2)
RBC #/AREA URNS HPF: ABNORMAL /HPF (ref 0–5)
SARS-COV-2, COV2: NOT DETECTED
SODIUM SERPL-SCNC: 144 MMOL/L (ref 136–145)
SP GR UR REFRACTOMETRY: 1.03 (ref 1–1.03)
UA: UC IF INDICATED,UAUC: ABNORMAL
UROBILINOGEN UR QL STRIP.AUTO: 1 EU/DL (ref 0.2–1)
WBC # BLD AUTO: 7.8 K/UL (ref 3.6–11)
WBC URNS QL MICRO: ABNORMAL /HPF (ref 0–4)

## 2022-04-15 PROCEDURE — 82077 ASSAY SPEC XCP UR&BREATH IA: CPT

## 2022-04-15 PROCEDURE — 99285 EMERGENCY DEPT VISIT HI MDM: CPT

## 2022-04-15 PROCEDURE — 81001 URINALYSIS AUTO W/SCOPE: CPT

## 2022-04-15 PROCEDURE — 85025 COMPLETE CBC W/AUTO DIFF WBC: CPT

## 2022-04-15 PROCEDURE — 87636 SARSCOV2 & INF A&B AMP PRB: CPT

## 2022-04-15 PROCEDURE — 80053 COMPREHEN METABOLIC PANEL: CPT

## 2022-04-15 PROCEDURE — 65270000029 HC RM PRIVATE

## 2022-04-15 PROCEDURE — 80307 DRUG TEST PRSMV CHEM ANLYZR: CPT
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
## 2022-04-15 NOTE — ED NOTES
Pt presents to ED via EMS complaining of anxiety and depression. Pt reports she is a recovering drug addict who has been clean from crack and alcohol for 2-3 weeks. Pt states she has been leaving at a crisis house and completed her time there yesterday. Pt reports trying to get admission into a shelter yesterday and being denied for \"being there too many times\" even though she says she hasn't been there before. Pt is tearful and appears honestly concerned for her well being and appears to truly want to improve her life. Patient states she has been having suicidal thoughts. Pt reports she has made attempts in the past to kill herself with pills and even though she does not want to hurt herself, she has been having thoughts about doing it. Pt denies HI. Pt is alert and oriented x 4, RR even and unlabored, skin is warm and dry. Assessment completed and pt updated on plan of care. Call bell in reach. Emergency Department Nursing Plan of Care       The Nursing Plan of Care is developed from the Nursing assessment and Emergency Department Attending provider initial evaluation. The plan of care may be reviewed in the ED Provider note.     The Plan of Care was developed with the following considerations:   Patient / Family readiness to learn indicated by:verbalized understanding  Persons(s) to be included in education: patient  Barriers to Learning/Limitations:No    Signed     Stan Rogers RN    4/15/2022   7:14 PM

## 2022-04-16 PROCEDURE — 74011250637 HC RX REV CODE- 250/637: Performed by: STUDENT IN AN ORGANIZED HEALTH CARE EDUCATION/TRAINING PROGRAM

## 2022-04-16 PROCEDURE — 65220000003 HC RM SEMIPRIVATE PSYCH

## 2022-04-16 RX ORDER — OLANZAPINE 5 MG/1
5 TABLET ORAL
Status: DISCONTINUED | OUTPATIENT
Start: 2022-04-16 | End: 2022-04-20 | Stop reason: HOSPADM

## 2022-04-16 RX ORDER — BUDESONIDE AND FORMOTEROL FUMARATE DIHYDRATE 80; 4.5 UG/1; UG/1
2 AEROSOL RESPIRATORY (INHALATION) 2 TIMES DAILY
Status: DISCONTINUED | OUTPATIENT
Start: 2022-04-16 | End: 2022-04-20 | Stop reason: HOSPADM

## 2022-04-16 RX ORDER — DIPHENHYDRAMINE HYDROCHLORIDE 50 MG/ML
50 INJECTION, SOLUTION INTRAMUSCULAR; INTRAVENOUS
Status: DISCONTINUED | OUTPATIENT
Start: 2022-04-16 | End: 2022-04-20 | Stop reason: HOSPADM

## 2022-04-16 RX ORDER — SERTRALINE HYDROCHLORIDE 50 MG/1
50 TABLET, FILM COATED ORAL DAILY
Status: ON HOLD | COMMUNITY
Start: 2022-03-25 | End: 2022-04-20 | Stop reason: SDUPTHER

## 2022-04-16 RX ORDER — HALOPERIDOL 5 MG/ML
5 INJECTION INTRAMUSCULAR
Status: DISCONTINUED | OUTPATIENT
Start: 2022-04-16 | End: 2022-04-20 | Stop reason: HOSPADM

## 2022-04-16 RX ORDER — TRAZODONE HYDROCHLORIDE 100 MG/1
1 TABLET ORAL
Status: ON HOLD | COMMUNITY
Start: 2022-03-25 | End: 2022-04-20 | Stop reason: SDUPTHER

## 2022-04-16 RX ORDER — LORAZEPAM 2 MG/ML
1 INJECTION INTRAMUSCULAR
Status: DISCONTINUED | OUTPATIENT
Start: 2022-04-16 | End: 2022-04-20 | Stop reason: HOSPADM

## 2022-04-16 RX ORDER — TRAZODONE HYDROCHLORIDE 50 MG/1
50 TABLET ORAL
Status: DISCONTINUED | OUTPATIENT
Start: 2022-04-16 | End: 2022-04-20 | Stop reason: HOSPADM

## 2022-04-16 RX ORDER — ADHESIVE BANDAGE
30 BANDAGE TOPICAL DAILY PRN
Status: DISCONTINUED | OUTPATIENT
Start: 2022-04-16 | End: 2022-04-20 | Stop reason: HOSPADM

## 2022-04-16 RX ORDER — ALBUTEROL SULFATE 90 UG/1
2 AEROSOL, METERED RESPIRATORY (INHALATION)
Status: DISCONTINUED | OUTPATIENT
Start: 2022-04-16 | End: 2022-04-20 | Stop reason: HOSPADM

## 2022-04-16 RX ORDER — HYDROXYZINE 25 MG/1
50 TABLET, FILM COATED ORAL
Status: DISCONTINUED | OUTPATIENT
Start: 2022-04-16 | End: 2022-04-20 | Stop reason: HOSPADM

## 2022-04-16 RX ORDER — SERTRALINE HYDROCHLORIDE 50 MG/1
50 TABLET, FILM COATED ORAL DAILY
Status: DISCONTINUED | OUTPATIENT
Start: 2022-04-17 | End: 2022-04-20 | Stop reason: HOSPADM

## 2022-04-16 RX ORDER — BENZTROPINE MESYLATE 1 MG/1
1 TABLET ORAL
Status: DISCONTINUED | OUTPATIENT
Start: 2022-04-16 | End: 2022-04-20 | Stop reason: HOSPADM

## 2022-04-16 RX ORDER — ACETAMINOPHEN 325 MG/1
650 TABLET ORAL
Status: DISCONTINUED | OUTPATIENT
Start: 2022-04-16 | End: 2022-04-20 | Stop reason: HOSPADM

## 2022-04-16 RX ADMIN — BUDESONIDE AND FORMOTEROL FUMARATE DIHYDRATE 2 PUFF: 80; 4.5 AEROSOL RESPIRATORY (INHALATION) at 12:22

## 2022-04-16 NOTE — ED PROVIDER NOTES
EMERGENCY DEPARTMENT HISTORY AND PHYSICAL EXAM    Date: 4/15/2022  Patient Name: Cadence Mckoy    History of Presenting Illness     Chief Complaint   Patient presents with    Anxiety         History Provided By: Patient    Chief Complaint: anxiety  Duration: 2 Weeks  Timing:  Acute  Quality: States she is crying all the time and trying to get help states she has been clean from cocaine and alcohol for 2 weeks  Severity: Severe  Modifying Factors: States she is homeless and trying to finda rehab center. Associated Symptoms: Suicidal ideation stating she would take pillsstating she would take pills      HPI: Cadence Mckoy is a 64 y.o. female with a PMH of COPD asthma who presents with anxiety cute onset few weeks ago. Patient states she is very depressed. States she is trying to get help that she has been off crack cocaine and alcohol for 2 weeks and was in a rehab program but was discharged and is looking for a new rehabilitation program.  States that she has had thoughts of suicide and would take pills but really does not want to hurt her self. .  Patient states she had a suicide attempt 20 years ago. Patient states she knows she needs help. PCP: None    Current Outpatient Medications   Medication Sig Dispense Refill    ondansetron hcl (Zofran) 4 mg tablet Take 1 Tablet by mouth every eight (8) hours as needed for Nausea or Vomiting. 20 Tablet 0    albuterol (PROVENTIL HFA, VENTOLIN HFA, PROAIR HFA) 90 mcg/actuation inhaler Take 2 Puffs by inhalation every four (4) hours as needed for Wheezing. 1 Each 0    azithromycin (ZITHROMAX) 250 mg tablet Take two tablets today then one tablet daily 6 Tablet 0    Nebulizers misc 3 mL by Does Not Apply route four (4) times daily as needed (sob). 30 Each 0    budesonide-formoteroL (SYMBICORT) 80-4.5 mcg/actuation HFAA Take 2 Puffs by inhalation two (2) times a day.  1 Inhaler 1       Past History     Past Medical History:  Past Medical History:   Diagnosis Date    Asthma  COPD (chronic obstructive pulmonary disease) (HCC)     Emphysema lung (HCC)        Past Surgical History:  No past surgical history on file. Family History:  No family history on file. Social History:  Social History     Tobacco Use    Smoking status: Current Every Day Smoker     Packs/day: 1.00     Years: 37.00     Pack years: 37.00    Smokeless tobacco: Never Used    Tobacco comment: 37 years   Substance Use Topics    Alcohol use: Yes     Alcohol/week: 6.0 standard drinks     Types: 6 Cans of beer per week     Comment: daily    Drug use: Yes     Types: Marijuana, Cocaine     Comment: tonight       Allergies:  No Known Allergies      Review of Systems   Review of Systems   Constitutional: Negative for fatigue and fever. Respiratory: Negative for shortness of breath and wheezing. Cardiovascular: Negative for chest pain. Gastrointestinal: Negative for abdominal pain. Musculoskeletal: Negative for arthralgias, myalgias and neck pain. Skin: Negative for pallor and rash. Neurological: Negative for dizziness and headaches. Psychiatric/Behavioral: Positive for sleep disturbance and suicidal ideas. The patient is nervous/anxious. All other systems reviewed and are negative. Physical Exam     Vitals:    04/15/22 1831 04/15/22 1842   BP: (!) 142/96    Pulse: (!) 58    Resp: 18    Temp: 98.6 °F (37 °C)    SpO2: 98%    Weight: 79.4 kg (175 lb) 79.4 kg (175 lb)   Height:  5' 1\" (1.549 m)     Physical Exam  Vitals and nursing note reviewed. Constitutional:       General: She is not in acute distress. Appearance: Normal appearance. She is well-developed. HENT:      Head: Normocephalic and atraumatic. Right Ear: External ear normal.      Left Ear: External ear normal.      Nose: Nose normal.      Mouth/Throat:      Mouth: Mucous membranes are moist.   Eyes:      Conjunctiva/sclera: Conjunctivae normal.   Cardiovascular:      Rate and Rhythm: Normal rate and regular rhythm. Heart sounds: Normal heart sounds. Pulmonary:      Effort: Pulmonary effort is normal. No respiratory distress. Breath sounds: Normal breath sounds. No wheezing. Abdominal:      General: Bowel sounds are normal.      Palpations: Abdomen is soft. Tenderness: There is no abdominal tenderness. Musculoskeletal:         General: Normal range of motion. Cervical back: Normal range of motion and neck supple. Lymphadenopathy:      Cervical: No cervical adenopathy. Skin:     General: Skin is warm and dry. Findings: No rash. Neurological:      Mental Status: She is alert and oriented to person, place, and time. Cranial Nerves: No cranial nerve deficit. Coordination: Coordination normal.   Psychiatric:         Attention and Perception: Attention normal.         Mood and Affect: Mood is depressed. Speech: Speech normal.         Behavior: Behavior normal. Behavior is cooperative. Thought Content: Thought content does not include homicidal or suicidal ideation. Diagnostic Study Results     Labs -     Recent Results (from the past 12 hour(s))   CBC WITH AUTOMATED DIFF    Collection Time: 04/15/22  8:55 PM   Result Value Ref Range    WBC 7.8 3.6 - 11.0 K/uL    RBC 4.31 3.80 - 5.20 M/uL    HGB 12.1 11.5 - 16.0 g/dL    HCT 38.6 35.0 - 47.0 %    MCV 89.6 80.0 - 99.0 FL    MCH 28.1 26.0 - 34.0 PG    MCHC 31.3 30.0 - 36.5 g/dL    RDW 15.6 (H) 11.5 - 14.5 %    PLATELET 091 648 - 295 K/uL    MPV 10.6 8.9 - 12.9 FL    NRBC 0.0 0  WBC    ABSOLUTE NRBC 0.00 0.00 - 0.01 K/uL    NEUTROPHILS 61 32 - 75 %    LYMPHOCYTES 30 12 - 49 %    MONOCYTES 8 5 - 13 %    EOSINOPHILS 1 0 - 7 %    BASOPHILS 0 0 - 1 %    IMMATURE GRANULOCYTES 0 0.0 - 0.5 %    ABS. NEUTROPHILS 4.7 1.8 - 8.0 K/UL    ABS. LYMPHOCYTES 2.3 0.8 - 3.5 K/UL    ABS. MONOCYTES 0.6 0.0 - 1.0 K/UL    ABS. EOSINOPHILS 0.1 0.0 - 0.4 K/UL    ABS. BASOPHILS 0.0 0.0 - 0.1 K/UL    ABS. IMM.  GRANS. 0.0 0.00 - 0.04 K/UL DF AUTOMATED     METABOLIC PANEL, COMPREHENSIVE    Collection Time: 04/15/22  8:55 PM   Result Value Ref Range    Sodium 144 136 - 145 mmol/L    Potassium 3.2 (L) 3.5 - 5.1 mmol/L    Chloride 105 97 - 108 mmol/L    CO2 29 21 - 32 mmol/L    Anion gap 10 5 - 15 mmol/L    Glucose 198 (H) 65 - 100 mg/dL    BUN 10 6 - 20 MG/DL    Creatinine 0.78 0.55 - 1.02 MG/DL    BUN/Creatinine ratio 13 12 - 20      GFR est AA >60 >60 ml/min/1.73m2    GFR est non-AA >60 >60 ml/min/1.73m2    Calcium 8.4 (L) 8.5 - 10.1 MG/DL    Bilirubin, total 0.7 0.2 - 1.0 MG/DL    ALT (SGPT) 18 12 - 78 U/L    AST (SGOT) 11 (L) 15 - 37 U/L    Alk.  phosphatase 68 45 - 117 U/L    Protein, total 6.7 6.4 - 8.2 g/dL    Albumin 3.4 (L) 3.5 - 5.0 g/dL    Globulin 3.3 2.0 - 4.0 g/dL    A-G Ratio 1.0 (L) 1.1 - 2.2     COVID-19 WITH INFLUENZA A/B    Collection Time: 04/15/22  8:55 PM   Result Value Ref Range    SARS-CoV-2 by PCR Not detected NOTD      Influenza A by PCR Not detected      Influenza B by PCR Not detected     ETHYL ALCOHOL    Collection Time: 04/15/22  8:55 PM   Result Value Ref Range    ALCOHOL(ETHYL),SERUM <10 <10 MG/DL   DRUG SCREEN, URINE    Collection Time: 04/15/22  9:19 PM   Result Value Ref Range    AMPHETAMINES Negative NEG      BARBITURATES Negative NEG      BENZODIAZEPINES Negative NEG      COCAINE Negative NEG      METHADONE Negative NEG      OPIATES Negative NEG      PCP(PHENCYCLIDINE) Negative NEG      THC (TH-CANNABINOL) Positive (A) NEG      Drug screen comment (NOTE)    URINALYSIS W/ REFLEX CULTURE    Collection Time: 04/15/22  9:19 PM    Specimen: Miscellaneous sample; Urine    Urine specimen   Result Value Ref Range    Color YELLOW/STRAW      Appearance CLEAR CLEAR      Specific gravity 1.030 1.003 - 1.030      pH (UA) 5.5 5.0 - 8.0      Protein Negative NEG mg/dL    Glucose Negative NEG mg/dL    Ketone 15 (A) NEG mg/dL    Blood Negative NEG      Urobilinogen 1.0 0.2 - 1.0 EU/dL    Nitrites Negative NEG      Leukocyte Esterase Negative NEG      WBC 0-4 0 - 4 /hpf    RBC 0-5 0 - 5 /hpf    Epithelial cells FEW FEW /lpf    Bacteria Negative NEG /hpf    UA:UC IF INDICATED CULTURE NOT INDICATED BY UA RESULT CNI     BILIRUBIN, CONFIRM    Collection Time: 04/15/22  9:19 PM   Result Value Ref Range    Bilirubin UA, confirm Negative NEG         Radiologic Studies -   No orders to display     CT Results  (Last 48 hours)    None        CXR Results  (Last 48 hours)    None            Medical Decision Making   I am the first provider for this patient. I reviewed the vital signs, available nursing notes, past medical history, past surgical history, family history and social history. Vital Signs-Reviewed the patient's vital signs. Records Reviewed: Nursing Notes    Provider Notes (Medical Decision Making):   59-year-old female presents with depression and anxiety homelessness history of alcohol and cocaine abuse states she has been clean for 2 weeks concern for relapse. Tearful we will order labs and be smart consult, probable admission  ED Course as of 04/18/22 1644   Fri Apr 15, 2022   2218 Waiting for accepting physician and bed assignment. Patient will be admitted per Alice Curtis BSMARTcounselor. [AN]   2252 Case signed out to Yobani SHELLEY. [AN]      ED Course User Index  [AN] Yanelis Quevedo NP          Procedures:  Procedures    Please note that this dictation was completed with Dragon, computer voice recognition software. Quite often unanticipated grammatical, syntax, homophones, and other interpretive errors are inadvertently transcribed by the computer software. Please disregard these errors. Additionally, please excuse any errors that have escaped final proofreading. Diagnosis     Clinical Impression:   1.  Anxiety associated with depression

## 2022-04-16 NOTE — ED NOTES
TRANSFER - OUT REPORT:    Verbal report given to Yves Dakin, RN (name) on [facility]  being transferred to [facility] (unit) for routine progression of care       Report consisted of patients Situation, Background, Assessment and   Recommendations(SBAR). Information from the following report(s) SBAR, Kardex, ED Summary and Recent Results was reviewed with the receiving nurse. Lines:       Opportunity for questions and clarification was provided.       Patient transported with: Mehrdad Salomon
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
## 2022-04-16 NOTE — BSMART NOTE
Comprehensive Assessment Form Part 1    Section I - Disposition    Axis I - Major Depressive Disorder   Axis II - Deferred  Axis III - COPD  Axis IV - Homeless, Relationship stressors, Lack of support system  [place] V - 35      The Medical Doctor to Psychiatrist conference was not completed. The Medical Doctor is in agreement with Psychiatrist disposition because of (reason) patient is requesting voluntary admission. The plan is admit to [facility]. The on-call Psychiatrist consulted was NP Iveth Valderrama.  The admitting Psychiatrist will be Dr. Leonor Harris. The admitting Diagnosis is Major Depression. The Payor source is [employer]. This writer reviewed the [address] [address] in nursing flowsheet and the risk level assigned is high. Based on this assessment, the risk of suicide is moderate and the plan is admit to inpatient psych. Section II - Integrated Summary  Summary:  Patient is a 64year old female seen face to face in the ER. She reported she has been \"begging for help\" recently and only has been getting phone numbers in return. She was in a [facility] program recently and was trying to get into a substance abuse IOP program.  She thought she had been accepted however they called her and said she has been in the program too many times before so is not eligible. She reported she has no idea what they are talking about because she has never been to that program before. [facility] but couldn't stay in the program because they have to walk everywhere and she couldn't with her COPD. She denied homicidal ideation and symptoms of psychosis. She reported she feels like giving up and dying because she can't seem to get help. She is requesting voluntary admission. The patient has demonstrated mental capacity to provide informed consent. The information is given by the patient. The Chief Complaint is mental health.   The Precipitant Factors are homeless, relationship stressors, lack of support system. Previous Hospitalizations: no  The patient has not previously been in restraints. Current Psychiatrist and/or  is NA. Lethality Assessment:    The potential for suicide is noted by the following: previous of attempt and ideation. The potential for homicide is not noted. The patient has not been a perpetrator of sexual or physical abuse. There are not pending charges. The patient is felt to be at risk for self harm or harm to others. The attending nurse was advised the patient needs supervision. Section III - Psychosocial  The patient's overall mood and attitude is withdrawn and tearful. Feelings of helplessness and hopelessness are observed by patient's self report. Generalized anxiety is not observed. Panic is not observed. Phobias are not observed. Obsessive compulsive tendencies are not observed. Section IV - Mental Status Exam  The patient's appearance shows no evidence of impairment. The patient's behavior is tearful. The patient is oriented to time, place, person and situation. The patient's speech shows no evidence of impairment. The patient's mood is withdrawn. The range of affect is constricted. The patient's thought content demonstrates no evidence of impairment. The thought process shows no evidence of impairment. The patient's perception shows no evidence of impairment. The patient's memory shows no evidence of impairment. The patient's appetite is decreased. The patient's sleep has evidence of insomnia. The patient's insight is blaming. The patient's judgement is psychologically impaired. Section V - Substance Abuse  The patient is not using substances. However, the patient was using alcohol for greater than 10 years with last use on 2 weeks ago, cannabis by smoking for greater than 10 years with last use on 2 weeks ago and cocaine by smoking for greater than 10 years with last use on 2 weeks ago. The patient has experienced the following withdrawal symptoms: N/A. Section VI - Living Arrangements  The patient is single. The patient is homeless. The patient has adult children. The patient does not plan to return home upon discharge. The patient does not have legal issues pending. The patient's source of income comes from social security. Buddhism and cultural practices have not been voiced at this time. The patient's greatest support comes from nobody and this person will not be involved with the treatment. The patient has been in an event described as horrible or outside the realm of ordinary life experience either currently or in the past.  The patient has not been a victim of sexual/physical abuse. Section VII - Other Areas of Clinical Concern  The highest grade achieved is not assessed with the overall quality of school experience being described as unknown. The patient is currently disabled and speaks [place] as a primary language. The patient has no communication impairments affecting communication. The patient's preference for learning can be described as: can read and write adequately.   The patient's hearing is normal.  The patient's vision is normal.      Basil Gomez MA

## 2022-04-16 NOTE — BH NOTES
PSYCHOSOCIAL ASSESSMENT  :Patient identifying info: Kp Putnam is a 64 y.o., female admitted 4/15/2022  6:27 PM     Presenting problem and precipitating factors: 64year old female admitted from [facility] ED endorsing passive SI of wanting to give up and die because she cannot get help. Pt is seeking substance use program to help with her addiction to cocaine and alcohol. Pt reports she feels alone and that nobody is willing to help her. Pt was recently discharged from [facility] POST[facility]. Pt was referred to [facility] but cannot participate because of her COPD, she cannot walk as much as they are asking her to. Pt denies KAYLIN WILLSON [facility]. Mental status assessment: Pt was pleasant and forthcoming with treatment team. Pt appropriately responded to assessment questions but required multiple questions in order to provide detail. Strengths/Recreation/Coping Skills: Insured, voluntary admission, steady income    Collateral information: Mattie Alvarado, daughter, [phone]    Current psychiatric /substance abuse providers and contact info: [facility]. Pt was prescribed Zoloft but ran out of prescription. Previous psychiatric/substance abuse providers and response to treatment: Previously at [facility] 2 weeks ago. Pt has had 10 ED visits since 1/2022 to various facilities ([place] [facility], [facility], [facility])    Family history of mental illness or substance abuse: None stated    Substance abuse history:  Hx of cocaine use and alcohol (1 case/day). UDS+ THC, BAL 0  Social History     Tobacco Use    Smoking status: Current Every Day Smoker     Packs/day: 1.00     Years: 37.00     Pack years: 37.00    Smokeless tobacco: Never Used    Tobacco comment: 37 years   Substance Use Topics    Alcohol use:  Yes     Alcohol/week: 6.0 standard drinks     Types: 6 Cans of beer per week     Comment: daily       History of biomedical complications associated with substance abuse: None stated    Patient's current acceptance of treatment or motivation for change: voluntary admission    Family constellation: Pt is single with 3 adult children (2 daughters, 1 son) and 4 grandsons    Is significant other involved? No    Describe support system: Minimal support in [place], family and services in [place]    Describe living arrangements and home environment: Homeless, originally from [place] but moved to [place]. Pt appears to be back and forth from [place] to [place], lives with her daughter in [place] sometimes but states she moved to [place] to be sober from drugs.     GUARDIAN/POA: NO    Guardian Name: None    Guardian Contact: None    Health issues:   Hospital Problems  Never Reviewed          Codes Class Noted POA    Major depression ICD-10-CM: F32.9  ICD-9-CM: 296.20  4/15/2022 Unknown              Trauma history: Pt denies hx of trauma    Legal issues: No pending legal charges    History of  service: None    Financial status: SSDI    Gnosticism/cultural factors: None stated    Education/work history: Completed 10th grade level of education    Have you been licensed as a health care professional (current or ): No    Describe coping skills: Limited, ineffective    Con Jose  2022

## 2022-04-16 NOTE — CONSULTS
Hospitalist Consultation Note    NAME:  Arik Busby   :   1965   MRN:   [id number]   Room Number: 327/01  @ [facility]     ATTENDING: Ghada Iniguez MD  PCP:  None    Date/Time:  2022 8:22 AM    Please note that this dictation was completed with [email or web address], the [employer] voice recognition software. Quite often unanticipated grammatical, syntax, homophones, and other interpretive errors are inadvertently transcribed by the computer software. Please disregard these errors. Please excuse any errors that have escaped final proofreading. Recommendations/Plan:       Active Problems:    Major depression (4/15/2022)       #History of COPD not in acute observation  -Resume home inhalers    #Tobacco use  - Patient was counseled extensively on the need to abstain from tobacco, its addictive tendencies, its deleterious effects on the lungs, cardiovascular  as well as its financial & social sequelae        -Psychiatric treatment and management of health issues,Defer to psychiatrist for further management.  -Continue home meds. -Medically stable at this time. We will follow up on a p.r.n. basis.  -No VTE prophylaxis indicated or warranted at this time. Subjective:   REQUESTING PHYSICIAN:  REASON FOR CONSULT:      Cj Ravi is a 64 y.o. female who I was asked to see for medical evaluation. Patient denied any medical complaint this time. No difficulty breathing chest pain or bowel/urinary problems. Patient is a current smoker and does some drug use            Past Medical History:   Diagnosis Date    Asthma     [facility] [address]     Emphyse[facility] lung [address] [address]       No past surgical history on file. Social History     Tobacco Use    Smoking status: Current Every Day Smoker     Packs/day: 1.00     Years: 37.00     Pack years: 37.00    Smokeless tobacco: Never Used    Tobacco comment: 37 years   Substance Use Topics    Alcohol use:  Yes Alcohol/week: 6.0 standard drinks     Types: 6 Cans of beer per week     Comment: daily      No family history on file. No Known Allergies   Prior to Admission medications    Medication Sig Start Date End Date Taking? Authorizing Provider   ondansetron hcl (Zofran) 4 mg tablet Take 1 Tablet by mouth every eight (8) hours as needed for Nausea or Vomiting. 2/22/22   Gustabo Enciso MD   albuterol (PROVENTIL HFA, VENTOLIN HFA, PROAIR HFA) 90 mcg/actuation inhaler Take 2 Puffs by inhalation every four (4) hours as needed for Wheezing. 2/4/22   Theodora Salguero MD   azithromycin (ZITHROMAX) 250 mg tablet Take two tablets today then one tablet daily 1/24/22   Keara Taylor PA-C   Nebulizers misc 3 mL by Does Not Apply route four (4) times daily as needed (sob). 7/11/20   Emely Borrego MD   budesonide-formoteroL (SYMBICORT) 80-4.5 mcg/actuation HFAA Take 2 Puffs by inhalation two (2) times a day. 7/9/20   Rosi Gifford MD       REVIEW OF SYSTEMS:     Total of 12 systems reviewed as follows:   I am not able to complete the review of systems because:    The patient is intubated and sedated    The patient has altered mental status due to his acute medical problems    The patient has baseline aphasia from prior stroke(s)    The patient has baseline dementia and is not reliable historian                 POSITIVE= underlined text  Negative = text not underlined  General:  fever, chills, sweats, generalized weakness, weight loss/gain,      loss of appetite   Eyes:    blurred vision, eye pain, loss of vision, double vision  ENT:    rhinorrhea, pharyngitis   Respiratory:   cough, sputum production, SOB, wheezing, LIZAMA, pleuritic pain   Cardiology:   chest pain, palpitations, orthopnea, PND, edema, syncope   Gastrointestinal:  abdominal pain , N/V, dysphagia, diarrhea, constipation, bleeding   Genitourinary:  frequency, urgency, dysuria, hematuria, incontinence   Muskuloskeletal :  arthralgia, myalgia   Hematology:  easy bruising, nose or gum bleeding, lymphadenopathy   Dermatological: rash, ulceration, pruritis   Endocrine:   hot flashes or polydipsia   Neurological:  headache, dizziness, confusion, focal weakness, paresthesia,     Speech difficulties, memory loss, gait disturbance  Psychological: Feelings of anxiety, depression, agitation    Objective:   VITALS:    Visit Vitals  /78 (BP Patient Position: Sitting)   Pulse 73   Temp 98.2 °F (36.8 °C)   Resp 17   Ht 5' 1\" (1.549 m)   Wt 79.4 kg (175 lb)   SpO2 100%   Breastfeeding No   BMI 33.07 kg/m²     Temp (24hrs), Av.4 °F (36.9 °C), Min:98.2 °F (36.8 °C), Max:98.6 °F (37 °C)      PHYSICAL EXAM:   General:    Alert, cooperative, no distress, appears stated age. HEENT: Atraumatic, anicteric sclerae, pink conjunctivae     No oral ulcers, mucosa moist, throat clear  Neck:  Supple, symmetrical,  thyroid: non tender  Lungs:   Mild crackles bilaterally   Chest wall:  No tenderness  No Accessory muscle use. Heart:   Regular  rhythm,  No  murmur   No edema  Abdomen:   Soft, non-tender. Not distended. Bowel sounds normal  Extremities: No cyanosis. No clubbing  Skin:     Not pale. Not Jaundiced  No rashes   Psych:    Not anxious or agitated. Neurologic: EOMs intact. No facial asymmetry. No aphasia or slurred speech.  Symmetrical strength, Alert and oriented X 4.     _______________________________________________________________________  Care Plan discussed with:    Comments   Patient x    Family      RN x    Care Manager                    Consultant:      ____________________________________________________________________  TOTAL TIME:    15mins    Comments    x Reviewed previous records   >50% of visit spent in counseling and coordination of care x Discussion with patient and/or family and questions answered       Critical Care Provided     Minutes non procedure based  ________________________________________________________________________  Signed: Diane Lutz, MD      Procedures: see electronic medical records for all procedures/Xrays and details which were not copied into this note but were reviewed prior to creation of Plan. LAB DATA REVIEWED:    Recent Results (from the past 24 hour(s))   CBC WITH AUTOMATED DIFF    Collection Time: 04/15/22  8:55 PM   Result Value Ref Range    WBC 7.8 3.6 - 11.0 K/uL    RBC 4.31 3.80 - 5.20 M/uL    HGB 12.1 11.5 - 16.0 g/dL    HCT 38.6 35.0 - 47.0 %    MCV 89.6 80.0 - 99.0 FL    MCH 28.1 26.0 - 34.0 PG    MCHC 31.3 30.0 - 36.5 g/dL    RDW 15.6 (H) 11.5 - 14.5 %    PLATELET 305 865 - 408 K/uL    MPV 10.6 8.9 - 12.9 FL    NRBC 0.0 0  WBC    ABSOLUTE NRBC 0.00 0.00 - 0.01 K/uL    NEUTROPHILS 61 32 - 75 %    LYMPHOCYTES 30 12 - 49 %    MONOCYTES 8 5 - 13 %    EOSINOPHILS 1 0 - 7 %    BASOPHILS 0 0 - 1 %    IMMATURE GRANULOCYTES 0 0.0 - 0.5 %    ABS. NEUTROPHILS 4.7 1.8 - 8.0 K/UL    ABS. LYMPHOCYTES 2.3 0.8 - 3.5 K/UL    ABS. MONOCYTES 0.6 0.0 - 1.0 K/UL    ABS. EOSINOPHILS 0.1 0.0 - 0.4 K/UL    ABS. BASOPHILS 0.0 0.0 - 0.1 K/UL    ABS. IMM. GRANS. 0.0 0.00 - 0.04 K/UL    DF AUTOMATED     METABOLIC PANEL, COMPREHENSIVE    Collection Time: 04/15/22  8:55 PM   Result Value Ref Range    Sodium 144 136 - 145 mmol/L    Potassium 3.2 (L) 3.5 - 5.1 mmol/L    Chloride 105 97 - 108 mmol/L    CO2 29 21 - 32 mmol/L    Anion gap 10 5 - 15 mmol/L    Glucose 198 (H) 65 - 100 mg/dL    BUN 10 6 - 20 MG/DL    Creatinine 0.78 0.55 - 1.02 MG/DL    BUN/Creatinine ratio 13 12 - 20      GFR est AA >60 >60 ml/min/1.73m2    GFR est non-AA >60 >60 ml/min/1.73m2    Calcium 8.4 (L) 8.5 - 10.1 MG/DL    Bilirubin, total 0.7 0.2 - 1.0 MG/DL    ALT (SGPT) 18 12 - 78 U/L    AST (SGOT) 11 (L) 15 - 37 U/L    Alk.  phosphatase 68 45 - 117 U/L    Protein, total 6.7 6.4 - 8.2 g/dL    Albumin 3.4 (L) 3.5 - 5.0 g/dL    Globulin 3.3 2.0 - 4.0 g/dL    A-G Ratio 1.0 (L) 1.1 - 2.2     COVID-19 WITH INFLUENZA A/B    Collection Time: 04/15/22  8:55 PM   Result Value Ref Range    SARS-CoV-2 by PCR Not detected NOTD      Influenza A by PCR Not detected      Influenza B by PCR Not detected     ETHYL ALCOHOL    Collection Time: 04/15/22  8:55 PM   Result Value Ref Range    ALCOHOL(ETHYL),SERUM <10 <10 MG/DL   DRUG SCREEN, URINE    Collection Time: 04/15/22  9:19 PM   Result Value Ref Range    AMPHETAMINES Negative NEG      BARBITURATES Negative NEG      BENZODIAZEPINES Negative NEG      COCAINE Negative NEG      METHADONE Negative NEG      OPIATES Negative NEG      PCP(PHENCYCLIDINE) Negative NEG      THC (TH-CANNABINOL) Positive (A) NEG      Drug screen comment (NOTE)    URINALYSIS W/ REFLEX CULTURE    Collection Time: 04/15/22  9:19 PM    Specimen: Miscellaneous sample; Urine    Urine specimen   Result Value Ref Range    Color YELLOW/STRAW      Appearance CLEAR CLEAR      Specific gravity 1.030 1.003 - 1.030      pH (UA) 5.5 5.0 - 8.0      Protein Negative NEG mg/dL    Glucose Negative NEG mg/dL    Ketone 15 (A) NEG mg/dL    Blood Negative NEG      Urobilinogen 1.0 0.2 - 1.0 EU/dL    Nitrites Negative NEG      Leukocyte Esterase Negative NEG      WBC 0-4 0 - 4 /hpf    RBC 0-5 0 - 5 /hpf    Epithelial cells FEW FEW /lpf    Bacteria Negative NEG /hpf    UA:UC IF INDICATED CULTURE NOT INDICATED BY UA RESULT CNI     BILIRUBIN, CONFIRM    Collection Time: 04/15/22  9:19 PM   Result Value Ref Range    Bilirubin UA, confirm Negative NEG         _____________________________  Hospitalist: Dirk Padgett MD

## 2022-04-16 NOTE — PROGRESS NOTES
Bedside and Verbal shift change report given to Madan Guerrero RN (oncoming nurse) by Ritesh Shaver RN (offgoing nurse). Report included the following information SBAR, Kardex, MAR, Accordion, and Recent Results. Assumed care of the patient, she is ambulating in the room. She denies SI, HI and anxiety. She states she has depression 6/10. She stated her last bowel movement was 4/15/2022. She denies pain. She is very pleasant and cooperative. Staff will continue to monitor for safety.     Problem: Depressed Mood (Adult/Pediatric)  Goal: *STG: Attends activities and groups  Outcome: Progressing Towards Goal  Goal: *STG: Remains safe in hospital  Outcome: Progressing Towards Goal     Problem: Patient Education: Go to Patient Education Activity  Goal: Patient/Family Education  Outcome: Progressing Towards Goal

## 2022-04-16 NOTE — ED NOTES
Bedside and Verbal shift change report given to Luz Jerry (oncoming nurse) by Roderick Hammans, RN (offgoing nurse). Report included the following information SBAR and ED Summary.

## 2022-04-16 NOTE — DISCHARGE INSTRUCTIONS
DISCHARGE SUMMARY    Gerard Perez  : 1965  MRN: [id number]    The patient Cadence Mckoy exhibits the ability to control behavior in a less restrictive environment. Patient's level of functioning is improving. No assaultive/destructive behavior has been observed for the past 24 hours. No suicidal/homicidal threat or behavior has been observed for the past 24 hours. There is no evidence of serious medication side effects. Patient has not been in physical or protective restraints for at least the past 24 hours. If weapons involved, how are they secured? ***    Is patient aware of and in agreement with discharge plan? ***    Arrangements for medication:  Prescriptions given to patient, given a weeks supply or 30 day supply. Copy of discharge instructions to provider?:  ***    Arrangements for transportation home:  ***    Keep all follow up appointments as scheduled, continue to take prescribed medications per physician instructions.   Mental health crisis number:  724 or your local mental health crisis line number at [facility] at [address] [facility] [address] WARM LINE      1-851-680-MHAV ([phone])      M-F: 9am to 9pm      Sat & Sun: 5pm - 9pm  National suicide prevention lines:                             5-382-MJFWMZU ([phone])       6-315-762-TALK ([phone])    Crisis Text Line:  Text HOME to [phone]

## 2022-04-16 NOTE — PROGRESS NOTES
Patient states she's here for admission beause she's depressed and has thoughts of wanting to die. She states she does not have a plan for how to harm herself and she wants help. She is alert, oriented, pleasant and cooperative with the admission process. She states she is homeless and she feels overwhelmed. She states she has had problems with ETOH abuse and states her last drink was about a week ago. She states she last used cocaine about two weeks ago. She denies hx of DT's. She was oriented to the unit and her room. She denies SI, HI, AVH and pain. She was given a snack and fluids and retired to her room for sleep. She appears to be sleeping well.

## 2022-04-16 NOTE — PROGRESS NOTES
Problem: Depressed Mood (Adult/Pediatric)  Goal: *STG: Participates in treatment plan  Outcome: Progressing Towards Goal  Goal: *STG: Verbalizes anger, guilt, and other feelings in a constructive manor  Outcome: Progressing Towards Goal  Goal: *STG: Remains safe in hospital  Outcome: Progressing Towards Goal

## 2022-04-16 NOTE — H&P
INITIAL PSYCHIATRIC INTERVIEW    CHIEF COMPLAINT: \" I just wanted to end it\"         HISTORY OF PRESENTING COMPLAINT:  Micheal Arambula is a 64 y.o. BLACK/ female who is currently admitted to the psychiatric floor at [facility]     She reported she has been \"begging for help\" recently and only has been getting phone numbers in return. She was in a [facility] program recently and was trying to get into a substance abuse IOP program for alcohol and cocaine use . She thought she had been accepted however they called her and said she has been in the program too many times before so is not eligible. She reported she has no idea what they are talking about because she has never been to that program before. She has [facility] to [facility] but couldn't stay in the program because they have to walk everywhere and she couldn't with her COPD. She denied homicidal ideation and symptoms of psychosis. She reported she feels like giving up and dying because she can't seem to get help. She reports that she is homeless and does not have any family or social support. PAST PSYCHIATRIC HISTORY and SUBSTANCE ABUSE HISTORY:  Multiple previous hospitalizations  Cocaine use  Marijuana use  Alcohol use      PAST MEDICAL HISTORY:  Please see H&P for details. Past Medical History:   Diagnosis Date    Asthma     [facility]ch[facility]) ([facility] [address]     Emphysema lung ([facility] [address]      Prior to Admission medications    Medication Sig Start Date End Date Taking? Authorizing Provider   sertraline (ZOLOFT) 50 mg tablet Take 50 mg by mouth daily. 3/25/22   Provider, Historical   traZODone (DESYREL) 100 mg tablet Take 1 Tablet by mouth nightly as needed for Sleep.  3/25/22   Provider, Historical   ondansetron hcl (Zofran) 4 mg tablet Take 1 Tablet by mouth every eight (8) hours as needed for Nausea or Vomiting. 2/22/22   Jacoby Campos MD   albuterol (PROVENTIL HFA, VENTOLIN HFA, PROAIR HFA) 90 mcg/actuation inhaler Take 2 Puffs by inhalation every four (4) hours as needed for Wheezing. 2/4/22   Ira Baker MD   azithromycin (ZITHROMAX) 250 mg tablet Take two tablets today then one tablet daily 1/24/22   Sarina Braxton PA-C   Nebulizers misc 3 mL by Does Not Apply route four (4) times daily as needed (sob). 7/11/20   Rikki Borrego MD   budesonide-formoteroL (SYMBICORT) 80-4.5 mcg/actuation HFAA Take 2 Puffs by inhalation two (2) times a day. 7/9/20   Freeman Granger MD     Vitals:    04/15/22 1831 04/15/22 1842 04/15/22 2355 04/16/22 0917   BP: (!) 142/96  117/78 107/79   Pulse: (!) 58  73 86   Resp: 18  17 18   Temp: 98.6 °F (37 °C)  98.2 °F (36.8 °C) 98.4 °F (36.9 °C)   SpO2: 98%  100% 95%   Weight: 79.4 kg (175 lb) 79.4 kg (175 lb)     Height:  5' 1\" (1.549 m)       Lab Results   Component Value Date/Time    WBC 7.8 04/15/2022 08:55 PM    HGB 12.1 04/15/2022 08:55 PM    HCT 38.6 04/15/2022 08:55 PM    PLATELET 473 43/40/6524 08:55 PM    MCV 89.6 04/15/2022 08:55 PM     Lab Results   Component Value Date/Time    Sodium 144 04/15/2022 08:55 PM    Potassium 3.2 (L) 04/15/2022 08:55 PM    Chloride 105 04/15/2022 08:55 PM    CO2 29 04/15/2022 08:55 PM    Anion gap 10 04/15/2022 08:55 PM    Glucose 198 (H) 04/15/2022 08:55 PM    BUN 10 04/15/2022 08:55 PM    Creatinine 0.78 04/15/2022 08:55 PM    BUN/Creatinine ratio 13 04/15/2022 08:55 PM    GFR est AA >60 04/15/2022 08:55 PM    GFR est non-AA >60 04/15/2022 08:55 PM    Calcium 8.4 (L) 04/15/2022 08:55 PM    Bilirubin, total 0.7 04/15/2022 08:55 PM    Alk.  phosphatase 68 04/15/2022 08:55 PM    Protein, total 6.7 04/15/2022 08:55 PM    Albumin 3.4 (L) 04/15/2022 08:55 PM    Globulin 3.3 04/15/2022 08:55 PM    A-G Ratio 1.0 (L) 04/15/2022 08:55 PM    ALT (SGPT) 18 04/15/2022 08:55 PM    AST (SGOT) 11 (L) 04/15/2022 08:55 PM     No results found for: VALF2, VALAC, VALP, VALPR, DS6, CRBAM, CRBAMP, CARB2, XCRBAM  No results found for: LITHM  RADIOLOGY REPORTS:(reviewed/updated 4/16/2022)  XR CHEST PORT    Result Date: 2/22/2022  Shortness of breath. Comparison chest x-ray 2/4/2022. Findings: Single frontal view of the chest. Normal cardiomediastinal silhouette. No vascular congestion or pulmonary edema. The lungs are well-inflated. No infiltrate, effusion, pneumothorax. No free air under the hemidiaphragms. Bones grossly intact. No acute findings. XR CHEST PORT    Result Date: 2/4/2022  EXAM: XR CHEST PORT INDICATION: chest pain COMPARISON: January 24, 2022 FINDINGS: Unremarkable cardiomediastinal contours. No focal airspace consolidation. No pneumothorax or pleural effusion. No acute fracture or dislocation. No acute finding. XR CHEST PORT    Result Date: 1/24/2022  INDICATION:  cough/ wheezing COMPARISON: January 13 FINDINGS: Single AP portable view of the chest obtained at 2019 demonstrates a stable cardiomediastinal silhouette. The lungs are clear bilaterally. No osseous abnormalities are seen. No evidence of acute cardiopulmonary process. No results found for: [address] , [id number], [id number], [id number], PREGU, POCHCG, MHCGN, HCGQR, THCGA1, SHCG, HCGN, HCGSERUM, HCGURQLPOC       PSYCHOSOCIAL HISTORY:  Homeless  Poor social and family support  Non-compliance with treatment       MENTAL STATUS EXAM:  General appearance:    groomed, psychomotor activity is WNL  Eye contact: WNL  Speech: Spontaneous, normal rate and rhythm  Affect : Depressed, decreased range  Mood: \"Depressed \"  Thought Process: Logical, goal directed  Perception: Denies AH or VH. Thought Content: SI  No Plan  Insight: Partial  Judgement: Fair  Cognition: Intact grossly. 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 Continue inpatient stay for the safety and optimum care of the patient   Routine labs to be ordered as needed. Psychotropic medications will be ordered and adjusted as needed.    Patients status is TDO  Voluntary  Supportive, milieu and group therapy. Continue rest of the medications as needed. Strengths include ability to seek help and   Estimated length of stay is 5-7 days. I certify that this patients inpatient psychiatric hospital services furnished since the previous certification were, and continue to be, required for treatment that could reasonably be expected to improve the patient's condition, or for diagnostic study, and that the patient continues to need, on a daily basis, active treatment furnished directly by or requiring the supervision of inpatient psychiatric facility personnel. In addition, the hospital records show that services furnished were intensive treatment services, admission or related services, or equivalent services.

## 2022-04-17 PROCEDURE — 74011250637 HC RX REV CODE- 250/637: Performed by: STUDENT IN AN ORGANIZED HEALTH CARE EDUCATION/TRAINING PROGRAM

## 2022-04-17 PROCEDURE — 65220000003 HC RM SEMIPRIVATE PSYCH

## 2022-04-17 PROCEDURE — 74011250637 HC RX REV CODE- 250/637

## 2022-04-17 PROCEDURE — 74011250637 HC RX REV CODE- 250/637: Performed by: NURSE PRACTITIONER

## 2022-04-17 RX ADMIN — BUDESONIDE AND FORMOTEROL FUMARATE DIHYDRATE 2 PUFF: 80; 4.5 AEROSOL RESPIRATORY (INHALATION) at 09:03

## 2022-04-17 RX ADMIN — MAGNESIUM HYDROXIDE 30 ML: 2400 SUSPENSION ORAL at 21:50

## 2022-04-17 RX ADMIN — HYDROXYZINE HYDROCHLORIDE 50 MG: 25 TABLET, FILM COATED ORAL at 21:50

## 2022-04-17 RX ADMIN — TRAZODONE HYDROCHLORIDE 50 MG: 50 TABLET ORAL at 21:50

## 2022-04-17 RX ADMIN — BUDESONIDE AND FORMOTEROL FUMARATE DIHYDRATE 2 PUFF: 80; 4.5 AEROSOL RESPIRATORY (INHALATION) at 17:27

## 2022-04-17 RX ADMIN — SERTRALINE 50 MG: 50 TABLET, FILM COATED ORAL at 09:03

## 2022-04-17 NOTE — PROGRESS NOTES
Chief Complaint: Im doing okay     Length of Stay: 2 Days    Interval History: Ernesto Retana reports that she is doing well today. She denies SI, HI or AVH. She has been taking medications as prescribed. She states that she has concerns that she is going to be sent back to the street when she is discharged and has concerns about needing treatment for substance abuse. Past Medical History:  Past Medical History:   Diagnosis Date    Asthma     COPD (chronic obstructive pulmonary disease) [facility] [address]     Emphysema lung ([facility]            Labs:  Lab Results   Component Value Date/Time    WBC 7.8 04/15/2022 08:55 PM    HGB 12.1 04/15/2022 08:55 PM    HCT 38.6 04/15/2022 08:55 PM    PLATELET 173 71/45/9353 08:55 PM    MCV 89.6 04/15/2022 08:55 PM      Lab Results   Component Value Date/Time    Sodium 144 04/15/2022 08:55 PM    Potassium 3.2 (L) 04/15/2022 08:55 PM    Chloride 105 04/15/2022 08:55 PM    CO2 29 04/15/2022 08:55 PM    Anion gap 10 04/15/2022 08:55 PM    Glucose 198 (H) 04/15/2022 08:55 PM    BUN 10 04/15/2022 08:55 PM    Creatinine 0.78 04/15/2022 08:55 PM    BUN/Creatinine ratio 13 04/15/2022 08:55 PM    GFR est AA >60 04/15/2022 08:55 PM    GFR est non-AA >60 04/15/2022 08:55 PM    Calcium 8.4 (L) 04/15/2022 08:55 PM    Bilirubin, total 0.7 04/15/2022 08:55 PM    Alk.  phosphatase 68 04/15/2022 08:55 PM    Protein, total 6.7 04/15/2022 08:55 PM    Albumin 3.4 (L) 04/15/2022 08:55 PM    Globulin 3.3 04/15/2022 08:55 PM    A-G Ratio 1.0 (L) 04/15/2022 08:55 PM    ALT (SGPT) 18 04/15/2022 08:55 PM      Vitals:    04/16/22 0917 04/16/22 2000 04/17/22 0802 04/17/22 0803   BP: 107/79 (!) 117/57 122/84 (!) 122/92   Pulse: 86 76 62 95   Resp: 18 16 18 18   Temp: 98.4 °F (36.9 °C) 98.5 °F (36.9 °C) 98.6 °F (37 °C) 97.5 °F (36.4 °C)   SpO2: 95% 95% 95% 99%   Weight:       Height:             Current Facility-Administered Medications   Medication Dose Route Frequency Provider Last Rate Last Admin    OLANZapine (ZyPREXA) tablet 5 mg  5 mg Oral Q6H PRN Bartholome Ramal, NP        haloperidol lactate (HALDOL) injection 5 mg  5 mg IntraMUSCular Q6H PRN Bartholome Ramal, NP        benztropine (COGENTIN) tablet 1 mg  1 mg Oral BID PRN Bartholome Ramal, NP        diphenhydrAMINE (BENADRYL) injection 50 mg  50 mg IntraMUSCular BID PRN Bartholome Ramal, NP        hydrOXYzine HCL (ATARAX) tablet 50 mg  50 mg Oral TID PRN Bartholome Ramal, NP        LORazepam (ATIVAN) injection 1 mg  1 mg IntraMUSCular Q4H PRN Bartholome Ramal, NP        traZODone (DESYREL) tablet 50 mg  50 mg Oral QHS PRN Bartholome Ramal, NP        acetaminophen (TYLENOL) tablet 650 mg  650 mg Oral Q4H PRN Bartholome Ramal, NP        magnesium hydroxide (MILK OF MAGNESIA) 400 mg/5 mL oral suspension 30 mL  30 mL Oral DAILY PRN Bartholome Ramal, NP        albuterol (PROVENTIL HFA, VENTOLIN HFA, PROAIR HFA) inhaler 2 Puff  2 Puff Inhalation Q4H PRN Antonio Hutchison MD        budesonide-formoterol (SYMBICORT) 80-4.5 mcg inhaler  2 Puff Inhalation BID Antonio Hutchison MD   2 Puff at 04/17/22 2614    sertraline (ZOLOFT) tablet 50 mg  50 mg Oral DAILY Srinivas Ardon NP   50 mg at 04/17/22 8818    artificial tears (dextran-hypromellose-glycerin) (GENTEAL) ophthalmic solution 1 Drop  1 Drop Both Eyes BID PRN Srinivas Ardon NP             Mental Status Exam:  Eye contact: WNL  Grooming: good, age appropriate  Psychomotor activity: WNL  Speech is spontaneous, clear, normal rate and rhythm  Mood is \"okay \"  Affect:congruent  Perception:Denies AVH  Suicidal ideation: Denies   Cognition is grossly intact         Physical Exam:  Body habitus: Body mass index is 33.07 kg/m². Musculoskeletal system: normal gait  Tremor - neg  Cog wheeling - neg      Assessment and Plan:  Viktoriya Sauer meets criteria for a diagnosis of MDD, substance use disorder, alcohol use disorder. Continue the medication regimen as prescribed  Disposition planning to continue.    A coordinated, multidisplinary treatment team round was conducted with the patient, nurses, pharmcist,  and writer present. Discussions held with , and/or with family members; Complete current electronic health record for patient was reviewed in full including consultant notes, ancillary staff notes, nurses and tech notes, labs and vitals. I certify that this patients inpatient psychiatric hospital services furnished since the previous certification were, and continue to be, required for treatment that could reasonably be expected to improve the patient's condition, or for diagnostic study, and that the patient continues to need, on a daily basis, active treatment furnished directly by or requiring the supervision of inpatient psychiatric facility personnel. In addition, the hospital records show that services furnished were intensive treatment services, admission or related services, or equivalent services.

## 2022-04-17 NOTE — PROGRESS NOTES
Problem: Depressed Mood (Adult/Pediatric)  Goal: *STG: Participates in treatment plan  Outcome: Progressing Towards Goal  Goal: *STG: Verbalizes anger, guilt, and other feelings in a constructive manor  Outcome: Progressing Towards Goal  Goal: *STG: Attends activities and groups  Outcome: Progressing Towards Goal  Goal: *STG: Demonstrates reduction in symptoms and increase in insight into coping skills/future focused  Outcome: Progressing Towards Goal     0084-1944 Shift report received from Mikal [address] [place] [address], [facility], RN   1930- 0030 Patient met sleeping in bed, calm and cooperative. She denies SI/HI/VAH, denies anxiety and depression.  Patient is medication and meal compliant, No prn given. No violence or aggression recorded. Hourly rounding ongoing. Will continue to monitor patient.     0030- 0400 Patient resting in her room. No violence recorded.   Quick 15 minutes checks ongoing for safety. 0400- 0600 Patient is sleeping. Q15 minutes checks ongoing.      At 0600, she slept a total of 10 hours.

## 2022-04-18 PROBLEM — F32.9 MAJOR DEPRESSION: Status: RESOLVED | Noted: 2022-04-15 | Resolved: 2022-04-18

## 2022-04-18 PROBLEM — F33.1 MODERATE EPISODE OF RECURRENT MAJOR DEPRESSIVE DISORDER (HCC): Status: ACTIVE | Noted: 2022-04-18

## 2022-04-18 PROBLEM — F14.10 COCAINE USE DISORDER, MILD, ABUSE (HCC): Status: ACTIVE | Noted: 2022-04-18

## 2022-04-18 PROBLEM — F43.23 ADJUSTMENT DISORDER WITH MIXED ANXIETY AND DEPRESSED MOOD: Status: ACTIVE | Noted: 2022-04-18

## 2022-04-18 PROBLEM — F10.10 ALCOHOL USE DISORDER, MILD, ABUSE: Status: ACTIVE | Noted: 2022-04-18

## 2022-04-18 PROCEDURE — 99232 SBSQ HOSP IP/OBS MODERATE 35: CPT | Performed by: PSYCHIATRY & NEUROLOGY

## 2022-04-18 PROCEDURE — 74011250637 HC RX REV CODE- 250/637: Performed by: NURSE PRACTITIONER

## 2022-04-18 PROCEDURE — 74011250637 HC RX REV CODE- 250/637

## 2022-04-18 PROCEDURE — 74011250637 HC RX REV CODE- 250/637: Performed by: STUDENT IN AN ORGANIZED HEALTH CARE EDUCATION/TRAINING PROGRAM

## 2022-04-18 PROCEDURE — 65220000003 HC RM SEMIPRIVATE PSYCH

## 2022-04-18 PROCEDURE — 74011250637 HC RX REV CODE- 250/637: Performed by: PSYCHIATRY & NEUROLOGY

## 2022-04-18 RX ORDER — TOPIRAMATE 25 MG/1
25 TABLET ORAL 2 TIMES DAILY WITH MEALS
Status: DISCONTINUED | OUTPATIENT
Start: 2022-04-18 | End: 2022-04-20 | Stop reason: HOSPADM

## 2022-04-18 RX ORDER — NALTREXONE HYDROCHLORIDE 50 MG/1
50 TABLET, FILM COATED ORAL DAILY
Status: DISCONTINUED | OUTPATIENT
Start: 2022-04-19 | End: 2022-04-20 | Stop reason: HOSPADM

## 2022-04-18 RX ADMIN — HYDROXYZINE HYDROCHLORIDE 50 MG: 25 TABLET, FILM COATED ORAL at 23:13

## 2022-04-18 RX ADMIN — BUDESONIDE AND FORMOTEROL FUMARATE DIHYDRATE 2 PUFF: 80; 4.5 AEROSOL RESPIRATORY (INHALATION) at 16:59

## 2022-04-18 RX ADMIN — TRAZODONE HYDROCHLORIDE 50 MG: 50 TABLET ORAL at 23:13

## 2022-04-18 RX ADMIN — SERTRALINE 50 MG: 50 TABLET, FILM COATED ORAL at 08:42

## 2022-04-18 RX ADMIN — TOPIRAMATE 25 MG: 25 TABLET, FILM COATED ORAL at 16:59

## 2022-04-18 RX ADMIN — BUDESONIDE AND FORMOTEROL FUMARATE DIHYDRATE 2 PUFF: 80; 4.5 AEROSOL RESPIRATORY (INHALATION) at 08:42

## 2022-04-18 NOTE — PROGRESS NOTES
Problem: Depressed Mood (Adult/Pediatric)  Goal: *STG: Participates in treatment plan  Outcome: Progressing Towards Goal  Goal: *STG: Verbalizes anger, guilt, and other feelings in a constructive manor  Outcome: Progressing Towards Goal  Goal: *STG: Attends activities and groups  Outcome: Progressing Towards Goal  Goal: *STG: Demonstrates reduction in symptoms and increase in insight into coping skills/future focused  Outcome: Progressing Towards Goal     2505-2909 Shift report received from Rosalio Ortiz RN   1930- 0030 Patient met watching TV in the dayroom, calm and cooperative. She denies SI/HI/VAH, denies anxiety and depression.  Patient is medication and meal compliant, prn Atarax, Trazodone and Milk of magnesia given on request. No violence or aggression recorded. Hourly rounding ongoing. Will continue to monitor patient.     0030- 0400 Patient resting in her room. No violence recorded.   Quick 15 minutes checks ongoing for safety. 0400- 0600 Patient is sleeping. Q15 minutes checks ongoing.      At 0600, she slept a total of 7.25 hours.

## 2022-04-18 NOTE — GROUP NOTE
DONTE  GROUP DOCUMENTATION INDIVIDUAL                                                                          Group Therapy Note    Date: 4/18/2022    Group Start Time: 1000  Group End Time: 1100  Group Topic: Topic Group    [facility] 3 ACUTE BEHAV [facility], [address] [facility] GROUP DOCUMENTATION GROUP    Group Therapy Note    Attendees: 10         Attendance: Attended    Patient's Goal:  To participate in chair exercise routine    Interventions/techniques: Supported-benefits of exercise    Follows Directions:  Followed directions    Interactions: Interacted appropriately    Mental Status: Calm    Behavior/appearance: Attentive, Cooperative and Needed prompting    Goals Achieved: Able to engage in interactions, Able to listen to others, Able to self-disclose and Discussed coping      Christophe Haq

## 2022-04-18 NOTE — BH NOTES
Chief Complaint: Im doing okay     Length of Stay: 3 Days    Interval History: Fina Dietz reports that she is doing well today. She denies SI, HI or AVH. She has been taking medications as prescribed. She states that she has concerns that she is going to be sent back to the street when she is discharged and has concerns about needing treatment for substance abuse. 04/18 - no acute overnight events. Patient slept 7.25 hours overnight, she is visible and in fair behavioral control and endorses anxiety but denies active thoughts of self harm. Patient amenable to rehab placement and is able to make her needs known. Patient agreeable to starting agents for cocaine and alcohol abuse, and is eager for placement. Past Medical History:  Past Medical History:   Diagnosis Date    Asthma     COPD (chronic obstructive pulmonary disease) ([facility]     Emphysema lung ([facility]            Labs:  Lab Results   Component Value Date/Time    WBC 7.8 04/15/2022 08:55 PM    HGB 12.1 04/15/2022 08:55 PM    HCT 38.6 04/15/2022 08:55 PM    PLATELET 367 06/88/0029 08:55 PM    MCV 89.6 04/15/2022 08:55 PM      Lab Results   Component Value Date/Time    Sodium 144 04/15/2022 08:55 PM    Potassium 3.2 (L) 04/15/2022 08:55 PM    Chloride 105 04/15/2022 08:55 PM    CO2 29 04/15/2022 08:55 PM    Anion gap 10 04/15/2022 08:55 PM    Glucose 198 (H) 04/15/2022 08:55 PM    BUN 10 04/15/2022 08:55 PM    Creatinine 0.78 04/15/2022 08:55 PM    BUN/Creatinine ratio 13 04/15/2022 08:55 PM    GFR est AA >60 04/15/2022 08:55 PM    GFR est non-AA >60 04/15/2022 08:55 PM    Calcium 8.4 (L) 04/15/2022 08:55 PM    Bilirubin, total 0.7 04/15/2022 08:55 PM    Alk.  phosphatase 68 04/15/2022 08:55 PM    Protein, total 6.7 04/15/2022 08:55 PM    Albumin 3.4 (L) 04/15/2022 08:55 PM    Globulin 3.3 04/15/2022 08:55 PM    A-G Ratio 1.0 (L) 04/15/2022 08:55 PM    ALT (SGPT) 18 04/15/2022 08:55 PM      Vitals:    04/17/22 0802 04/17/22 0803 04/17/22 2053 04/18/22 1798 BP: 122/84 (!) 122/92 119/80 117/79   Pulse: 62 95 69 83   Resp: 18 18 16 18   Temp: 98.6 °F (37 °C) 97.5 °F (36.4 °C) 98.4 °F (36.9 °C) 97.3 °F (36.3 °C)   SpO2: 95% 99% 95% 98%   Weight:       Height:             Current Facility-Administered Medications   Medication Dose Route Frequency Provider Last Rate Last Admin    [START ON 4/19/2022] naltrexone (DEPADE) tablet 50 mg  50 mg Oral DAILY Parveen Busch MD        topiramate (TOPAMAX) tablet 25 mg  25 mg Oral BID WITH MEALS Parveen Busch MD   25 mg at 04/18/22 1659    OLANZapine (ZyPREXA) tablet 5 mg  5 mg Oral Q6H PRN Giovanni Snooks, NP        haloperidol lactate (HALDOL) injection 5 mg  5 mg IntraMUSCular Q6H PRN Giovanni Snooks, NP        benztropine (COGENTIN) tablet 1 mg  1 mg Oral BID PRN Giovanni Snooks, NP        diphenhydrAMINE (BENADRYL) injection 50 mg  50 mg IntraMUSCular BID PRN Giovanni Snooks, NP        hydrOXYzine HCL (ATARAX) tablet 50 mg  50 mg Oral TID PRN Giovanni Snooks, NP   50 mg at 04/17/22 2150    LORazepam (ATIVAN) injection 1 mg  1 mg IntraMUSCular Q4H PRN Giovanni Snooks, NP        traZODone (DESYREL) tablet 50 mg  50 mg Oral QHS PRN Giovanni Snooks, NP   50 mg at 04/17/22 2150    acetaminophen (TYLENOL) tablet 650 mg  650 mg Oral Q4H PRN Giovanni Snooks, NP        magnesium hydroxide (MILK OF MAGNESIA) 400 mg/5 mL oral suspension 30 mL  30 mL Oral DAILY PRN Emma JUAREZ NP   30 mL at 04/17/22 2150    albuterol (PROVENTIL HFA, VENTOLIN HFA, PROAIR HFA) inhaler 2 Puff  2 Puff Inhalation Q4H PRN Jamie Crum MD        budesonide-formoterol (SYMBICORT) 80-4.5 mcg inhaler  2 Puff Inhalation BID Jamie Crum MD   2 Puff at 04/18/22 1659    sertraline (ZOLOFT) tablet 50 mg  50 mg Oral DAILY Erin Cruz NP   50 mg at 04/18/22 3128    artificial tears (dextran-hypromellose-glycerin) (GENTEAL) ophthalmic solution 1 Drop  1 Drop Both Eyes BID PRN Erin Cruz NP             Mental Status Exam:  Eye contact: WNL  Grooming: good, age appropriate  Psychomotor activity: WNL  Speech is spontaneous, clear, normal rate and rhythm  Mood is \"okay \"  Affect:congruent  Perception:Denies AVH  Suicidal ideation: Denies   Cognition is grossly intact         Physical Exam:  Body habitus: Body mass index is 33.07 kg/m². Musculoskeletal system: normal gait  Tremor - neg  Cog wheeling - neg      Assessment and Plan:  Sahara Briceño meets criteria for a diagnosis of MDD, substance use disorder, alcohol use disorder. Continue the medication regimen as prescribed  - START Naltrexone 50 mg QDAY for EtOH use disorder  - START Topamax 25 mg BIDAC for cocaine use disorder    Disposition planning to continue. A coordinated, multidisplinary treatment team round was conducted with the patient, nurses, pharmcist,  and writer present. Discussions held with , and/or with family members; Complete current electronic health record for patient was reviewed in full including consultant notes, ancillary staff notes, nurses and tech notes, labs and vitals. I certify that this patients inpatient psychiatric hospital services furnished since the previous certification were, and continue to be, required for treatment that could reasonably be expected to improve the patient's condition, or for diagnostic study, and that the patient continues to need, on a daily basis, active treatment furnished directly by or requiring the supervision of inpatient psychiatric facility personnel. In addition, the hospital records show that services furnished were intensive treatment services, admission or related services, or equivalent services.

## 2022-04-18 NOTE — PROGRESS NOTES
Problem: Depressed Mood (Adult/Pediatric)  Goal: *STG: Participates in treatment plan  Outcome: Progressing Towards Goal  Goal: *STG: Demonstrates reduction in symptoms and increase in insight into coping skills/future focused  Outcome: Progressing Towards Goal  Goal: *STG: Remains safe in hospital  Outcome: Progressing Towards Goal     0700: Shift change report given to Mary Ann WHITTEN (oncoming nurse) by Jerica Miller (offgoing nurse). Report included the following information SBAR, Kardex, MAR and Recent Results. 1164-4803: Assumed care of patient. Met patient in dayroom. Calm and cooperative during assessment. Patient denied anxiety, depression, SI, HI, and AVH. No complaints of pain. Medication and meal compliant. 8284-1690: Patient in dayroom for groups; interacting appropriately with staff and peers. 4380-9024: Meal compliant. 3745-5296: Patient to dayroom for groups and snacks. 0582-2215: Medication and meal compliant. Patient given first dose of Topamax. When writer gave patient medication education stating that medication is a anticonvulsant and nerve pain medication, patient went up to nurses station c/o nerve pain that she has had for the past 3 years. Patient first said it was at her hand then she states is on her arm. Patient in dayroom watching TV and talking with peers.

## 2022-04-18 NOTE — BH NOTES
Behavioral Health Treatment Team Note         Patient goal(s) for today: continue taking medication as prescribed, engage in unit activities, participate in hygiene/ADLs, follow directions from staff, contact support team, prepare for discharge  Treatment team focus/goals: medication adjustments, dispo planning, update support system    Progress note Pt reports passive suicidal ideation. Pt reports she struggles with alcohol and cocaine use. Pt does not wish to enter inpatient rehab at this time but is willing to go to a CSU upon discharge. LOS:  3  Expected LOS: TBD    Financial concerns/prescription coverage: [place]    Date of last family contact: None      Family requesting physician contact today:  No  Discharge plan: CSU vs Rehab   Guns in the home: None reported       Outpatient provider(s): To be linked. Participating treatment team members:  Augusta Yanez and Dr. Oliver Stratton

## 2022-04-19 LAB
GLUCOSE BLD STRIP.AUTO-MCNC: 91 MG/DL (ref 65–117)
SERVICE CMNT-IMP: NORMAL

## 2022-04-19 PROCEDURE — 65220000003 HC RM SEMIPRIVATE PSYCH

## 2022-04-19 PROCEDURE — 82962 GLUCOSE BLOOD TEST: CPT

## 2022-04-19 PROCEDURE — 74011250637 HC RX REV CODE- 250/637: Performed by: NURSE PRACTITIONER

## 2022-04-19 PROCEDURE — 99232 SBSQ HOSP IP/OBS MODERATE 35: CPT | Performed by: PSYCHIATRY & NEUROLOGY

## 2022-04-19 PROCEDURE — 74011250637 HC RX REV CODE- 250/637: Performed by: PSYCHIATRY & NEUROLOGY

## 2022-04-19 PROCEDURE — 74011250637 HC RX REV CODE- 250/637: Performed by: STUDENT IN AN ORGANIZED HEALTH CARE EDUCATION/TRAINING PROGRAM

## 2022-04-19 PROCEDURE — 74011250637 HC RX REV CODE- 250/637

## 2022-04-19 RX ADMIN — BUDESONIDE AND FORMOTEROL FUMARATE DIHYDRATE 2 PUFF: 80; 4.5 AEROSOL RESPIRATORY (INHALATION) at 17:16

## 2022-04-19 RX ADMIN — SERTRALINE 50 MG: 50 TABLET, FILM COATED ORAL at 09:07

## 2022-04-19 RX ADMIN — TRAZODONE HYDROCHLORIDE 50 MG: 50 TABLET ORAL at 21:07

## 2022-04-19 RX ADMIN — NALTREXONE HYDROCHLORIDE 50 MG: 50 TABLET, FILM COATED ORAL at 09:07

## 2022-04-19 RX ADMIN — TOPIRAMATE 25 MG: 25 TABLET, FILM COATED ORAL at 09:08

## 2022-04-19 RX ADMIN — TOPIRAMATE 25 MG: 25 TABLET, FILM COATED ORAL at 17:17

## 2022-04-19 RX ADMIN — HYDROXYZINE HYDROCHLORIDE 50 MG: 25 TABLET, FILM COATED ORAL at 21:07

## 2022-04-19 RX ADMIN — BUDESONIDE AND FORMOTEROL FUMARATE DIHYDRATE 2 PUFF: 80; 4.5 AEROSOL RESPIRATORY (INHALATION) at 09:08

## 2022-04-19 NOTE — PROGRESS NOTES
Problem: Depressed Mood (Adult/Pediatric)  Goal: *STG: Remains safe in hospital  Outcome: Progressing Towards Goal     Problem: Falls - Risk of  Goal: *Absence of Falls  Description: Document Heather Fall Risk and appropriate interventions in the flowsheet.   Outcome: Progressing Towards Goal  Note: Fall Risk Interventions:

## 2022-04-19 NOTE — BH NOTES
Chief Complaint: Im doing okay     Length of Stay: 4 Days    Interval History: Hilton Pal reports that she is doing well today. She denies SI, HI or AVH. She has been taking medications as prescribed. She states that she has concerns that she is going to be sent back to the street when she is discharged and has concerns about needing treatment for substance abuse. 04/18 - no acute overnight events. Patient slept 7.25 hours overnight, she is visible and in fair behavioral control and endorses anxiety but denies active thoughts of self harm. Patient amenable to rehab placement and is able to make her needs known. Patient agreeable to starting agents for cocaine and alcohol abuse, and is eager for placement. 04/19 - the patient remains visible, pleasant but oddly related and concrete. She is medication compliant, denies active thoughts of self harm and is not in any distress from substance withdrawal. Patient is discharge focused and has been able to make her needs known. She is hoping to step down to CSU; per SW this will happen early tomorrow AM. Patient got no PRNs overnight and is otherwise in fair behavioral control.        Past Medical History:  Past Medical History:   Diagnosis Date    Asthma     [facility]chronic obstructive [facility] disease) [facility]     Emphysema lung ([facility]            Labs:  Lab Results   Component Value Date/Time    WBC 7.8 04/15/2022 08:55 PM    HGB 12.1 04/15/2022 08:55 PM    HCT 38.6 04/15/2022 08:55 PM    PLATELET 658 56/11/4266 08:55 PM    MCV 89.6 04/15/2022 08:55 PM      Lab Results   Component Value Date/Time    Sodium 144 04/15/2022 08:55 PM    Potassium 3.2 (L) 04/15/2022 08:55 PM    Chloride 105 04/15/2022 08:55 PM    CO2 29 04/15/2022 08:55 PM    Anion gap 10 04/15/2022 08:55 PM    Glucose 198 (H) 04/15/2022 08:55 PM    BUN 10 04/15/2022 08:55 PM    Creatinine 0.78 04/15/2022 08:55 PM    BUN/Creatinine ratio 13 04/15/2022 08:55 PM    GFR est AA >60 04/15/2022 08:55 PM    GFR est non-AA >60 04/15/2022 08:55 PM    Calcium 8.4 (L) 04/15/2022 08:55 PM    Bilirubin, total 0.7 04/15/2022 08:55 PM    Alk.  phosphatase 68 04/15/2022 08:55 PM    Protein, total 6.7 04/15/2022 08:55 PM    Albumin 3.4 (L) 04/15/2022 08:55 PM    Globulin 3.3 04/15/2022 08:55 PM    A-G Ratio 1.0 (L) 04/15/2022 08:55 PM    ALT (SGPT) 18 04/15/2022 08:55 PM      Vitals:    04/17/22 2053 04/18/22 0839 04/18/22 1935 04/19/22 0738   BP: 119/80 117/79 (!) 119/90 91/69   Pulse: 69 83 63 74   Resp: 16 18 17 18   Temp: 98.4 °F (36.9 °C) 97.3 °F (36.3 °C) 98.4 °F (36.9 °C) 98.2 °F (36.8 °C)   SpO2: 95% 98%  98%   Weight:       Height:             Current Facility-Administered Medications   Medication Dose Route Frequency Provider Last Rate Last Admin    naltrexone (DEPADE) tablet 50 mg  50 mg Oral DAILY Parveen Busch MD   50 mg at 04/19/22 0907    topiramate (TOPAMAX) tablet 25 mg  25 mg Oral BID WITH MEALS Parveen Busch MD   25 mg at 04/19/22 0908    OLANZapine (ZyPREXA) tablet 5 mg  5 mg Oral Q6H PRN Lady Jal, NP        haloperidol lactate (HALDOL) injection 5 mg  5 mg IntraMUSCular Q6H PRN Lady Jal, NP        benztropine (COGENTIN) tablet 1 mg  1 mg Oral BID PRN Lady Jal, NP        diphenhydrAMINE (BENADRYL) injection 50 mg  50 mg IntraMUSCular BID PRN Lady Jal, NP        hydrOXYzine HCL (ATARAX) tablet 50 mg  50 mg Oral TID PRN Lady Jal, NP   50 mg at 04/18/22 2313    LORazepam (ATIVAN) injection 1 mg  1 mg IntraMUSCular Q4H PRN Lady Jal, NP        traZODone (DESYREL) tablet 50 mg  50 mg Oral QHS PRN Lady Jal, NP   50 mg at 04/18/22 2313    acetaminophen (TYLENOL) tablet 650 mg  650 mg Oral Q4H PRN Lady Jal, NP        magnesium hydroxide (MILK OF MAGNESIA) 400 mg/5 mL oral suspension 30 mL  30 mL Oral DAILY PRN Naz JUAREZ NP   30 mL at 04/17/22 2150    albuterol (PROVENTIL HFA, VENTOLIN HFA, PROAIR HFA) inhaler 2 Puff  2 Puff Inhalation Q4H ROHITHN Sy Bill MD        budesonide-formoterol (SYMBICORT) 80-4.5 mcg inhaler  2 Puff Inhalation BID Sy Bill MD   2 Puff at 04/19/22 0908    sertraline (ZOLOFT) tablet 50 mg  50 mg Oral DAILY Larissa Alexandria, NP   50 mg at 04/19/22 8544    artificial tears (dextran-hypromellose-glycerin) (GENTEAL) ophthalmic solution 1 Drop  1 Drop Both Eyes BID PRN Larissa Schooling, NP             Mental Status Exam:  Eye contact: WNL  Grooming: good, age appropriate  Psychomotor activity: WNL  Speech is spontaneous, clear, normal rate and rhythm  Mood is \"okay \"  Affect:congruent  Perception:Denies AVH  Suicidal ideation: Denies   Cognition is grossly intact         Physical Exam:  Body habitus: Body mass index is 33.07 kg/m². Musculoskeletal system: normal gait  Tremor - neg  Cog wheeling - neg      Assessment and Plan:  Ramsey Hinson meets criteria for a diagnosis of MDD, substance use disorder, alcohol use disorder. Continue the medication regimen as prescribed  - CONTINUE Naltrexone 50 mg QDAY for EtOH use disorder  - CONTINUE Topamax 25 mg BIDAC for cocaine use disorder  - DISPO to CSU on 4/20/22 at 10 AM    Disposition planning to continue. A coordinated, multidisplinary treatment team round was conducted with the patient, nurses, pharmcist,  and writer present. Discussions held with , and/or with family members; Complete current electronic health record for patient was reviewed in full including consultant notes, ancillary staff notes, nurses and tech notes, labs and vitals.   I certify that this patients inpatient psychiatric hospital services furnished since the previous certification were, and continue to be, required for treatment that could reasonably be expected to improve the patient's condition, or for diagnostic study, and that the patient continues to need, on a daily basis, active treatment furnished directly by or requiring the supervision of inpatient psychiatric facility personnel. In addition, the hospital records show that services furnished were intensive treatment services, admission or related services, or equivalent services.

## 2022-04-19 NOTE — BH NOTES
During shift, pt remained in bed. Pt denies SI, HI, A/V hallucinations. Meal/med compliant. Currently, pt is resting in bed with eyes closed. No signs of distress nor made any further complaints. Locked Unit. Patient slept this shift 7 hours, respirations noted even and unlabored. Safe environment maintained, q15 min safety checks maintained. Patient updated on plan of care.

## 2022-04-19 NOTE — GROUP NOTE
DONTE  GROUP DOCUMENTATION INDIVIDUAL                                                                          Group Therapy Note    Date: 4/19/2022    Group Start Time: 1400  Group End Time: 1500  Group Topic: Recreational/Music Therapy    [facility] - [place] [address] ACUTE BEHAV [facility] [place]    [place], [address] [facility] GROUP DOCUMENTATION GROUP    Group Therapy Note    Attendees: 11         Attendance: Attended    Patient's Goal:  To concentrate on selected task    Interventions/techniques: Supported-crafts,games,music    Follows Directions:  Followed directions    Interactions: Interacted appropriately    Mental Status: Calm    Behavior/appearance: Attentive, Cooperative and Needed prompting    Goals Achieved: Able to engage in interactions and Able to listen to others-active participant in game    Patricia Culleno

## 2022-04-19 NOTE — GROUP NOTE
DONTE  GROUP DOCUMENTATION INDIVIDUAL                                                                          Group Therapy Note    Date: 4/19/2022    Group Start Time: 1000  Group End Time: 1100  Group Topic: Topic Group    [facility] [place] [address] ACUTE BEHAV [facility]    [place], [address] [facility] GROUP DOCUMENTATION GROUP    Group Therapy Note    Attendees: 8         Attendance: Attended    Patient's Goal:  To participate in stress management nuMVC game    Interventions/techniques: Supported-things pertaining to stress    Follows Directions:  Followed directions    Interactions: Interacted appropriately    Mental Status: Calm    Behavior/appearance: Attentive, Cooperative and Needed prompting    Goals Achieved: Able to engage in interactions, Able to listen to others, Able to self-disclose and Discussed coping      Weston Barrett

## 2022-04-20 VITALS
BODY MASS INDEX: 33.04 KG/M2 | WEIGHT: 175 LBS | HEIGHT: 61 IN | HEART RATE: 77 BPM | TEMPERATURE: 97.6 F | RESPIRATION RATE: 17 BRPM | DIASTOLIC BLOOD PRESSURE: 81 MMHG | SYSTOLIC BLOOD PRESSURE: 123 MMHG | OXYGEN SATURATION: 96 %

## 2022-04-20 LAB
FLUAV RNA SPEC QL NAA+PROBE: NOT DETECTED
FLUBV RNA SPEC QL NAA+PROBE: NOT DETECTED
SARS-COV-2, COV2: NOT DETECTED

## 2022-04-20 PROCEDURE — 87636 SARSCOV2 & INF A&B AMP PRB: CPT

## 2022-04-20 PROCEDURE — 74011250637 HC RX REV CODE- 250/637: Performed by: PSYCHIATRY & NEUROLOGY

## 2022-04-20 PROCEDURE — 74011250637 HC RX REV CODE- 250/637: Performed by: NURSE PRACTITIONER

## 2022-04-20 PROCEDURE — 74011250637 HC RX REV CODE- 250/637: Performed by: STUDENT IN AN ORGANIZED HEALTH CARE EDUCATION/TRAINING PROGRAM

## 2022-04-20 PROCEDURE — 99239 HOSP IP/OBS DSCHRG MGMT >30: CPT | Performed by: PSYCHIATRY & NEUROLOGY

## 2022-04-20 RX ORDER — SERTRALINE HYDROCHLORIDE 50 MG/1
50 TABLET, FILM COATED ORAL DAILY
Qty: 30 TABLET | Refills: 1 | Status: SHIPPED | OUTPATIENT
Start: 2022-04-20

## 2022-04-20 RX ORDER — HYDROXYZINE 50 MG/1
50 TABLET, FILM COATED ORAL
Qty: 60 TABLET | Refills: 1 | Status: SHIPPED | OUTPATIENT
Start: 2022-04-20 | End: 2022-06-19

## 2022-04-20 RX ORDER — NALTREXONE HYDROCHLORIDE 50 MG/1
50 TABLET, FILM COATED ORAL DAILY
Qty: 30 TABLET | Refills: 1 | Status: SHIPPED | OUTPATIENT
Start: 2022-04-21

## 2022-04-20 RX ORDER — BUDESONIDE AND FORMOTEROL FUMARATE DIHYDRATE 80; 4.5 UG/1; UG/1
2 AEROSOL RESPIRATORY (INHALATION) 2 TIMES DAILY
Qty: 1 EACH | Refills: 1 | Status: SHIPPED | OUTPATIENT
Start: 2022-04-20

## 2022-04-20 RX ORDER — TRAZODONE HYDROCHLORIDE 100 MG/1
100 TABLET ORAL
Qty: 30 TABLET | Refills: 1 | Status: SHIPPED | OUTPATIENT
Start: 2022-04-20

## 2022-04-20 RX ORDER — TOPIRAMATE 25 MG/1
25 TABLET ORAL 2 TIMES DAILY WITH MEALS
Qty: 60 TABLET | Refills: 1 | Status: SHIPPED | OUTPATIENT
Start: 2022-04-20

## 2022-04-20 RX ORDER — ALBUTEROL SULFATE 90 UG/1
2 AEROSOL, METERED RESPIRATORY (INHALATION)
Qty: 1 EACH | Refills: 1 | Status: SHIPPED | OUTPATIENT
Start: 2022-04-20 | End: 2022-06-07 | Stop reason: SDUPTHER

## 2022-04-20 RX ADMIN — TOPIRAMATE 25 MG: 25 TABLET, FILM COATED ORAL at 08:42

## 2022-04-20 RX ADMIN — BUDESONIDE AND FORMOTEROL FUMARATE DIHYDRATE 2 PUFF: 80; 4.5 AEROSOL RESPIRATORY (INHALATION) at 08:42

## 2022-04-20 RX ADMIN — NALTREXONE HYDROCHLORIDE 50 MG: 50 TABLET, FILM COATED ORAL at 08:42

## 2022-04-20 RX ADMIN — SERTRALINE 50 MG: 50 TABLET, FILM COATED ORAL at 08:42

## 2022-04-20 NOTE — PROGRESS NOTES
Problem: Depressed Mood (Adult/Pediatric)  Goal: *STG: Participates in 1:1 therapy sessions  Outcome: Progressing Towards Goal  Goal: *STG: Attends activities and groups  Outcome: Progressing Towards Goal

## 2022-04-20 NOTE — PROGRESS NOTES
Pt was pleasant and cooperative with assessment. Pt is compliant with scheduled medications and excited about discharge. Pt requires frequent redirection about getting their belongings gathered for discharge. Denies SI, HI, AH and VH. Pt was escorted to their [employer] with no complication and observed driving off with no complications.

## 2022-04-20 NOTE — PROGRESS NOTES
Patient participated in spirituality group on 4/20/2022. Rev.  Nehemiah Lara MDiv, [facility], [facility]   paging service: [phone])

## 2022-04-20 NOTE — PROGRESS NOTES
Pharmacist Discharge Medication Reconciliation    Discharging Provider: Dr. Lona Patel PMH:   Past Medical History:   Diagnosis Date    Asthma     COPD (chronic obstructive pulmonary disease) ([facility]     Emphysema lung ([facility]      Chief Complaint for this Admission:   Chief Complaint   Patient presents with    Anxiety     Allergies: Patient has no known allergies. Discharge Medications:   Current Discharge Medication List        START taking these medications    Details   hydrOXYzine HCL (ATARAX) 50 mg tablet Take 1 Tablet by mouth two (2) times daily as needed for Anxiety for up to 60 days. Indications: anxious  Qty: 60 Tablet, Refills: 1  Start date: 4/20/2022, End date: 6/19/2022      naltrexone (DEPADE) 50 mg tablet Take 1 Tablet by mouth daily. Indications: alcoholism, prevention of relapse to opioid dependence  Qty: 30 Tablet, Refills: 1  Start date: 4/21/2022      topiramate (TOPAMAX) 25 mg tablet Take 1 Tablet by mouth two (2) times daily (with meals). Indications: cocaine use disorder  Qty: 60 Tablet, Refills: 1  Start date: 4/20/2022           CONTINUE these medications which have CHANGED    Details   sertraline (ZOLOFT) 50 mg tablet Take 1 Tablet by mouth daily. Indications: anxiousness associated with depression  Qty: 30 Tablet, Refills: 1  Start date: 4/20/2022      albuterol (PROVENTIL HFA, VENTOLIN HFA, PROAIR HFA) 90 mcg/actuation inhaler Take 2 Puffs by inhalation every four (4) hours as needed for Wheezing. Indications: asthma attack  Qty: 1 Each, Refills: 1  Start date: 4/20/2022      budesonide-formoteroL (SYMBICORT) 80-4.5 mcg/actuation HFAA Take 2 Puffs by inhalation two (2) times a day. Indications: controller medication for asthma  Qty: 1 Each, Refills: 1  Start date: 4/20/2022      traZODone (DESYREL) 100 mg tablet Take 1 Tablet by mouth nightly as needed for Sleep.  Indications: insomnia associated with depression  Qty: 30 Tablet, Refills: 1  Start date: 4/20/2022           STOP taking these medications       ondansetron hcl (Zofran) 4 mg tablet Comments:   Reason for Stopping:         Nebulizers misc Comments:   Reason for Stopping:               The patient's chart, MAR and AVS were reviewed by Matilde Wade, PASTORAD, BCPS

## 2022-04-20 NOTE — PROGRESS NOTES
Spiritual Care Assessment/Progress Note  Isabelleiraida Roman      NAME: Adilia Nunes      MRN: [id number]  AGE: 64 y.o.  SEX: female  Lutheran Affiliation: Esperanza Banda   Language: English     4/20/2022     Total Time (in minutes): 10     Spiritual Assessment begun in [place] [address] [address] through conversation with:         [x]Patient        [] Family    [] Friend(s)        Reason for Consult: Initial/Spiritual assessment, patient floor     Spiritual beliefs: (Please include comment if needed)     [x] Identifies with a nilsa tradition:  Rastafari      [] Supported by a nilsa community:            [] Claims no spiritual orientation:           [] Seeking spiritual identity:                [] Adheres to an individual form of spirituality:           [] Not able to assess:                           Identified resources for coping:      [x] Prayer                               [] Music                  [] Guided Imagery     [] Family/friends                 [] Pet visits     [] Devotional reading                         [] Unknown     [] Other:                                              Interventions offered during this visit: (See comments for more details)    Patient Interventions: Affirmation of nilsa,Affirmation of emotions/emotional suffering,Catharsis/review of pertinent events in supportive environment,Coping skills reviewed/reinforced,Iconic (affirming the presence of God/Higher Power),Prayer (actual)           Plan of Care:     [] Support spiritual and/or cultural needs    [] Support AMD and/or advance care planning process      [] Support grieving process   [] Coordinate Rites and/or Rituals    [] Coordination with community clergy   [] No spiritual needs identified at this time   [] Detailed Plan of Care below (See Comments)  [] Make referral to Music Therapy  [] Make referral to Pet Therapy     [] Make referral to Addiction services  [] Make referral to [facility]  [] Make referral to Spiritual Care Partner  [] No future visits requested        [x] Contact Spiritual Care for further referrals     Comments:  visit for initial spiritual assessment. Met patient privately in her room with the door open. Provided spiritual presence, listening, and support. Patient spoke of her health and the events leading to this admission. Says she is being discharged to a crisis center for further treatment. Hopes she will also be able to find a place to live upon discharge from crisis center as she says she has been homeless for some time. Spoke of what it has been like living on the streets and has spoken of the spiritual changes that have occurred as a result. Says she has gained a deeper understanding and compassion for others as a result and a deeper relationship with God after struggling with homelessness. Her greatest fear currently is to have to go back to the streets. She requested prayer and a prayer was offered. She appeared comforted and encouraged as a result of this prayer and visit and expressed gratitude for this prayer and visit. Rev.  Arianna Mejia MDiv, [facility], [facility]   paging service: [phone])

## 2022-04-20 NOTE — BH NOTES
During shift, pt remained in bed. Pt denies SI, HI, A/V hallucinations. Meal/med compliant. Currently, pt is resting in bed with eyes closed. No signs of distress nor made any further complaints. Locked Unit. Patient slept this shift 6 hours, respirations noted even and unlabored. Safe environment maintained, q15 min safety checks maintained. Patient updated on plan of care.

## 2022-04-20 NOTE — PROGRESS NOTES
Problem: Depressed Mood (Adult/Pediatric)  Goal: *STG: Participates in treatment plan  Outcome: Progressing Towards Goal  Goal: *STG: Participates in 1:1 therapy sessions  Outcome: Progressing Towards Goal  Goal: *STG: Verbalizes anger, guilt, and other feelings in a constructive manor  Outcome: Progressing Towards Goal  Goal: *STG: Attends activities and groups  Outcome: Progressing Towards Goal

## 2022-04-20 NOTE — DISCHARGE SUMMARY
PSYCHIATRIC DISCHARGE SUMMARY         IDENTIFICATION:    Patient Name  Chivo Berkowitz   Date of Birth 1965   [facility] [id number]   Medical Record Number  [id number]      Age  64 y.o. PCP None   Admit date:  4/15/2022    Discharge date: 4/20/2022   Room Number  327/01  @ [facility]   Date of Service  4/20/2022            TYPE OF DISCHARGE: REGULAR               CONDITION AT DISCHARGE: improved and fair       PROVISIONAL & DISCHARGE DIAGNOSES:    Problem List  Never Reviewed          Codes Class    Adjustment disorder with mixed anxiety and depressed mood ICD-10-CM: F43.23  ICD-9-CM: 309.28         Cocaine use disorder, mild, abuse (HCC) ICD-10-CM: F14.10  ICD-9-CM: 305.60         Alcohol use disorder, mild, abuse ICD-10-CM: F10.10  ICD-9-CM: 305.00         * (Principal) Moderate episode of recurrent major depressive disorder ([facility] ICD-10-CM: F33.1  ICD-9-CM: 296.32         COPD exacerbation ([facility] ICD-10-CM: J44.1  ICD-9-CM: 491.21               Active Hospital Problems    Adjustment disorder with mixed anxiety and depressed mood      Cocaine use disorder, mild, abuse (HCC)      Alcohol use disorder, mild, abuse      *Moderate episode of recurrent major depressive disorder (HCC)        DISCHARGE DIAGNOSIS:   Axis I:  SEE ABOVE  Axis II: SEE ABOVE  Axis III: SEE ABOVE  Axis IV:  lack of structure  Axis V:  30 on admission, 55 on discharge     CC & HISTORY OF PRESENT ILLNESS:  \"suicidal ideation / detox\"    Chivo Berkowitz is a 64 y.o.  BLACK/ female who is currently admitted to the psychiatric floor at [facility]. Zarina Supasonny reported she has been \"begging for help\" recently and only has been getting phone numbers in return. Zarina Tabares was in a CSU program recently and was trying to get into a substance abuse IOP program for alcohol and cocaine use .  She thought she had been accepted however they called her and said she has been in the program too many times before so is not eligible.  She reported she has no idea what they are talking about because she has never been to that program before. Gopi Chan [facility] but couldn't stay in the program because they have to walk everywhere and she couldn't with her COPD.  She denied homicidal ideation and symptoms of psychosis.  She reported she feels like giving up and dying because she can't seem to get help.  She reports that she is homeless and does not have any family or social support. 04/18 - no acute overnight events. Patient slept 7.25 hours overnight, she is visible and in fair behavioral control and endorses anxiety but denies active thoughts of self harm. Patient amenable to rehab placement and is able to make her needs known. Patient agreeable to starting agents for cocaine and alcohol abuse, and is eager for placement.     04/19 - the patient remains visible, pleasant but oddly related and concrete. She is medication compliant, denies active thoughts of self harm and is not in any distress from substance withdrawal. Patient is discharge focused and has been able to make her needs known. She is hoping to step down to CSU; per SW this will happen early tomorrow AM. Patient got no PRNs overnight and is otherwise in fair behavioral control. SOCIAL HISTORY:    Social History     Socioeconomic History    Marital status: SINGLE     Spouse name: Not on file    Number of children: Not on file    Years of education: Not on file    Highest education level: Not on file   Occupational History    Not on file   Tobacco Use    Smoking status: Current Every Day Smoker     Packs/day: 1.00     Years: 37.00     Pack years: 37.00    Smokeless tobacco: Never Used    Tobacco comment: 37 years   Substance and Sexual Activity    Alcohol use:  Yes     Alcohol/week: 6.0 standard drinks     Types: 6 Cans of beer per week     Comment: daily    Drug use: Yes     Types: Marijuana, Cocaine     Comment: tonight    Sexual activity: Not on file   Other Topics Concern    Not on file   Social History Narrative    Not on file     Social Determinants of Health     Financial Resource Strain:     Difficulty of Paying Living Expenses: Not on file   Food Insecurity:     Worried About [address] in the Last Year: Not on file    Jemraine of Food in the Last Year: Not on file   Transportation Needs:     Lack of Transportation (Medical): Not on file    Lack of Transportation (Non-Medical): Not on file   Physical Activity:     Days of Exercise per Week: Not on file    Minutes of Exercise per Session: Not on file   Stress:     Feeling of Stress : Not on file   Social Connections:     Frequency of Communication with Friends and Family: Not on file    Frequency of Social Gatherings with Friends and Family: Not on file    Attends Quaker Services: Not on file    Active Member of [address] [place] [address] or Organizations: Not on file    Attends Club or Organization Meetings: Not on file    Marital Status: Not on file   Intimate Partner Violence:     Fear of Current or Ex-Partner: Not on file    Emotionally Abused: Not on file    Physically Abused: Not on file    Sexually Abused: Not on file   Housing Stability:     Unable to Pay for Housing in the Last Year: Not on file    Number of [place] in the Last Year: Not on file    Unstable Housing in the Last Year: Not on file      FAMILY HISTORY:   No family history on file. HOSPITALIZATION COURSE:    Nivia Vale was admitted to the inpatient psychiatric unit [facility] for acute psychiatric stabilization in regards to symptomatology as described in the HPI above. The differential diagnosis at time of admission included: MDD vs adjustment disorder. While on the unit Nivia Vale was involved in individual, group, occupational and milieu therapy. Psychiatric medications were adjusted during this hospitalization including Topamax and .    Nivia Vale demonstrated a slow, but progressive improvement in overall condition. Much of patient's initial presentation appeared to be related to situational stressors, effects of medication non-compliance drugs of abuse, and psychological factors. Please see individual progress notes for more specific details regarding patient's hospitalization course. Patient with request for discharge today. There are no grounds to seek a TDO. At time of discharge, Camilla Juarez is without significant problems of depression, psychosis, or elvin. Patient free of suicidal and homicidal ideations (appears to be at very low risk of suicide or homicide) and reports many positive predictive factors in terms of not attempting suicide or homicide. Overall presentation at time of discharge is most consistent with the diagnosis of MDD and cocaine use disorder. Patient has maximized benefit to be derived from acute inpatient psychiatric treatment. All members of the treatment team concur with each other in regards to plans for discharge today. Patient aware and in agreement with discharge and discharge plan.          LABS AND IMAGAING:    Labs Reviewed   CBC WITH AUTOMATED DIFF - Abnormal; Notable for the following components:       Result Value    RDW 15.6 (*)     All other components within normal limits   METABOLIC PANEL, COMPREHENSIVE - Abnormal; Notable for the following components:    Potassium 3.2 (*)     Glucose 198 (*)     Calcium 8.4 (*)     AST (SGOT) 11 (*)     Albumin 3.4 (*)     A-G Ratio 1.0 (*)     All other components within normal limits   DRUG SCREEN, URINE - Abnormal; Notable for the following components:    THC (TH-CANNABINOL) Positive (*)     All other components within normal limits   URINALYSIS W/ REFLEX CULTURE - Abnormal; Notable for the following components:    Ketone 15 (*)     All other components within normal limits   COVID-19 WITH INFLUENZA A/B   COVID-19 WITH INFLUENZA A/B   ETHYL ALCOHOL   BILIRUBIN, CONFIRM   GLUCOSE, POC No results found for: DS35, PHEN, PHENO, PHENT, DILF, DS39, PHENY, PTN, VALF2, VALAC, VALP, VALPR, DS6, CRBAM, CRBAMP, CARB2, XCRBAM  Admission on 04/15/2022   Component Date Value Ref Range Status    WBC 04/15/2022 7.8  3.6 - 11.0 K/uL Final    RBC 04/15/2022 4.31  3.80 - 5.20 M/uL Final    HGB 04/15/2022 12.1  11.5 - 16.0 g/dL Final    HCT 04/15/2022 38.6  35.0 - 47.0 % Final    MCV 04/15/2022 89.6  80.0 - 99.0 FL Final    MCH 04/15/2022 28.1  26.0 - 34.0 PG Final    MCHC 04/15/2022 31.3  30.0 - 36.5 g/dL Final    RDW 04/15/2022 15.6* 11.5 - 14.5 % Final    PLATELET 12/76/6428 334  150 - 400 K/uL Final    MPV 04/15/2022 10.6  8.9 - 12.9 FL Final    NRBC 04/15/2022 0.0  0  WBC Final    ABSOLUTE NRBC 04/15/2022 0.00  0.00 - 0.01 K/uL Final    NEUTROPHILS 04/15/2022 61  32 - 75 % Final    LYMPHOCYTES 04/15/2022 30  12 - 49 % Final    MONOCYTES 04/15/2022 8  5 - 13 % Final    EOSINOPHILS 04/15/2022 1  0 - 7 % Final    BASOPHILS 04/15/2022 0  0 - 1 % Final    IMMATURE GRANULOCYTES 04/15/2022 0  0.0 - 0.5 % Final    ABS. NEUTROPHILS 04/15/2022 4.7  1.8 - 8.0 K/UL Final    ABS. LYMPHOCYTES 04/15/2022 2.3  0.8 - 3.5 K/UL Final    ABS. MONOCYTES 04/15/2022 0.6  0.0 - 1.0 K/UL Final    ABS. EOSINOPHILS 04/15/2022 0.1  0.0 - 0.4 K/UL Final    ABS. BASOPHILS 04/15/2022 0.0  0.0 - 0.1 K/UL Final    ABS. IMM.  GRANS. 04/15/2022 0.0  0.00 - 0.04 K/UL Final    DF 04/15/2022 AUTOMATED    Final    Sodium 04/15/2022 144  136 - 145 mmol/L Final    Potassium 04/15/2022 3.2* 3.5 - 5.1 mmol/L Final    Chloride 04/15/2022 105  97 - 108 mmol/L Final    CO2 04/15/2022 29  21 - 32 mmol/L Final    Anion gap 04/15/2022 10  5 - 15 mmol/L Final    Glucose 04/15/2022 198* 65 - 100 mg/dL Final    BUN 04/15/2022 10  6 - 20 MG/DL Final    Creatinine 04/15/2022 0.78  0.55 - 1.02 MG/DL Final    BUN/Creatinine ratio 04/15/2022 13  12 - 20   Final    GFR est AA 04/15/2022 >60  >60 ml/min/1.73m2 Final    GFR est non-AA 04/15/2022 >60  >60 ml/min/1.73m2 Final    Calcium 04/15/2022 8.4* 8.5 - 10.1 MG/DL Final    Bilirubin, total 04/15/2022 0.7  0.2 - 1.0 MG/DL Final    ALT (SGPT) 04/15/2022 18  12 - 78 U/L Final    AST (SGOT) 04/15/2022 11* 15 - 37 U/L Final    Alk.  phosphatase 04/15/2022 68  45 - 117 U/L Final    Protein, total 04/15/2022 6.7  6.4 - 8.2 g/dL Final    Albumin 04/15/2022 3.4* 3.5 - 5.0 g/dL Final    Globulin 04/15/2022 3.3  2.0 - 4.0 g/dL Final    A-G Ratio 04/15/2022 1.0* 1.1 - 2.2   Final    SARS-CoV-2 by PCR 04/15/2022 Not detected  NOTD   Final    Influenza A by PCR 04/15/2022 Not detected    Final    Influenza B by PCR 04/15/2022 Not detected    Final    ALCOHOL(ETHYL),SERUM 04/15/2022 <10  <10 MG/DL Final    AMPHETAMINES 04/15/2022 Negative  NEG   Final    BARBITURATES 04/15/2022 Negative  NEG   Final    BENZODIAZEPINES 04/15/2022 Negative  NEG   Final    COCAINE 04/15/2022 Negative  NEG   Final    METHADONE 04/15/2022 Negative  NEG   Final    OPIATES 04/15/2022 Negative  NEG   Final    PCP(PHENCYCLIDINE) 04/15/2022 Negative  NEG   Final    THC (TH-CANNABINOL) 04/15/2022 Positive* NEG   Final    Drug screen comment 04/15/2022 (NOTE)   Final    Color 04/15/2022 YELLOW/STRAW    Final    Appearance 04/15/2022 CLEAR  CLEAR   Final    Specific gravity 04/15/2022 1.030  1.003 - 1.030   Final    pH (UA) 04/15/2022 5.5  5.0 - 8.0   Final    Protein 04/15/2022 Negative  NEG mg/dL Final    Glucose 04/15/2022 Negative  NEG mg/dL Final    Ketone 04/15/2022 15* NEG mg/dL Final    Blood 04/15/2022 Negative  NEG   Final    Urobilinogen 04/15/2022 1.0  0.2 - 1.0 EU/dL Final    Nitrites 04/15/2022 Negative  NEG   Final    Leukocyte Esterase 04/15/2022 Negative  NEG   Final    WBC 04/15/2022 0-4  0 - 4 /hpf Final    RBC 04/15/2022 0-5  0 - 5 /hpf Final    Epithelial cells 04/15/2022 FEW  FEW /lpf Final    Bacteria 04/15/2022 Negative  NEG /hpf Final    UA:UC IF INDICATED 04/15/2022 CULTURE NOT INDICATED BY UA RESULT  CNI   Final    Bilirubin UA, confirm 04/15/2022 Negative  NEG   Final    Glucose (POC) 04/19/2022 91  65 - 117 mg/dL Final    Performed by 04/19/2022 Gustavo Ha (BRUNO RN)   Final    SARS-CoV-2 by PCR 04/20/2022 Not detected  NOTD   Final    Influenza A by PCR 04/20/2022 Not detected    Final    Influenza B by PCR 04/20/2022 Not detected    Final     No results found. DISPOSITION:    Home. Patient to f/u with psychiatric and psychotherapy appointments. Patient is to f/u with internist as directed. FOLLOW-UP CARE:    Activity as tolerated  Regular diet  Wound Care: none needed. Follow-up Information     Follow up With Specialties Details Why Contact Info    None    None ([phone]) Patient stated that they have no PCP                   PROGNOSIS:   Fair ---- based on nature of patient's pathology/ies and treatment compliance issues. Prognosis is greatly dependent upon patient's ability to remain sober and to follow up with scheduled appointments as well as to comply with psychiatric medications as prescribed. DISCHARGE MEDICATIONS:     Informed consent given for the use of following psychotropic medications:  Current Discharge Medication List      START taking these medications    Details   hydrOXYzine HCL (ATARAX) 50 mg tablet Take 1 Tablet by mouth two (2) times daily as needed for Anxiety for up to 60 days. Indications: anxious  Qty: 60 Tablet, Refills: 1  Start date: 4/20/2022, End date: 6/19/2022      naltrexone (DEPADE) 50 mg tablet Take 1 Tablet by mouth daily. Indications: alcoholism, prevention of relapse to opioid dependence  Qty: 30 Tablet, Refills: 1  Start date: 4/21/2022      topiramate (TOPAMAX) 25 mg tablet Take 1 Tablet by mouth two (2) times daily (with meals).  Indications: cocaine use disorder  Qty: 60 Tablet, Refills: 1  Start date: 4/20/2022         CONTINUE these medications which have CHANGED    Details sertraline (ZOLOFT) 50 mg tablet Take 1 Tablet by mouth daily. Indications: anxiousness associated with depression  Qty: 30 Tablet, Refills: 1  Start date: 4/20/2022      albuterol (PROVENTIL HFA, VENTOLIN HFA, PROAIR HFA) 90 mcg/actuation inhaler Take 2 Puffs by inhalation every four (4) hours as needed for Wheezing. Indications: asthma attack  Qty: 1 Each, Refills: 1  Start date: 4/20/2022      budesonide-formoteroL (SYMBICORT) 80-4.5 mcg/actuation HFAA Take 2 Puffs by inhalation two (2) times a day. Indications: controller medication for asthma  Qty: 1 Each, Refills: 1  Start date: 4/20/2022      traZODone (DESYREL) 100 mg tablet Take 1 Tablet by mouth nightly as needed for Sleep. Indications: insomnia associated with depression  Qty: 30 Tablet, Refills: 1  Start date: 4/20/2022         STOP taking these medications       ondansetron hcl (Zofran) 4 mg tablet Comments:   Reason for Stopping:         Nebulizers misc Comments:   Reason for Stopping:                      A coordinated, multidisplinary treatment team round was conducted with [place] is done daily here at [facility]. This team consists of the nurse, psychiatric unit pharmacist,  and Justine Mccormick. I have spent greater than 35 minutes on discharge work.     Signed:  Estefani Gregorio MD  4/20/2022

## 2022-06-07 ENCOUNTER — HOSPITAL ENCOUNTER (EMERGENCY)
Age: 57
Discharge: HOME | End: 2022-06-07
Attending: EMERGENCY MEDICINE
Payer: MEDICAID

## 2022-06-07 ENCOUNTER — APPOINTMENT (OUTPATIENT)
Dept: GENERAL RADIOLOGY | Age: 57
End: 2022-06-07
Attending: EMERGENCY MEDICINE
Payer: MEDICAID

## 2022-06-07 VITALS
BODY MASS INDEX: 33.04 KG/M2 | WEIGHT: 175 LBS | HEIGHT: 61 IN | DIASTOLIC BLOOD PRESSURE: 89 MMHG | HEART RATE: 80 BPM | TEMPERATURE: 98.2 F | RESPIRATION RATE: 21 BRPM | SYSTOLIC BLOOD PRESSURE: 151 MMHG | OXYGEN SATURATION: 95 %

## 2022-06-07 DIAGNOSIS — F32.A DEPRESSION, UNSPECIFIED DEPRESSION TYPE: Primary | ICD-10-CM

## 2022-06-07 DIAGNOSIS — J44.1 ACUTE EXACERBATION OF CHRONIC OBSTRUCTIVE PULMONARY DISEASE (COPD) (HCC): ICD-10-CM

## 2022-06-07 DIAGNOSIS — Z59.00 HOMELESSNESS: ICD-10-CM

## 2022-06-07 PROCEDURE — 74011000250 HC RX REV CODE- 250: Performed by: EMERGENCY MEDICINE

## 2022-06-07 PROCEDURE — 94640 AIRWAY INHALATION TREATMENT: CPT

## 2022-06-07 PROCEDURE — 77030029684 HC NEB SM VOL KT MONA -A

## 2022-06-07 PROCEDURE — 74011250636 HC RX REV CODE- 250/636: Performed by: EMERGENCY MEDICINE

## 2022-06-07 PROCEDURE — 99284 EMERGENCY DEPT VISIT MOD MDM: CPT

## 2022-06-07 PROCEDURE — 93005 ELECTROCARDIOGRAM TRACING: CPT

## 2022-06-07 PROCEDURE — 96372 THER/PROPH/DIAG INJ SC/IM: CPT

## 2022-06-07 PROCEDURE — 71045 X-RAY EXAM CHEST 1 VIEW: CPT

## 2022-06-07 RX ORDER — ALBUTEROL SULFATE 90 UG/1
2 AEROSOL, METERED RESPIRATORY (INHALATION)
Qty: 1 EACH | Refills: 1 | Status: SHIPPED | OUTPATIENT
Start: 2022-06-07

## 2022-06-07 RX ORDER — PREDNISONE 10 MG/1
TABLET ORAL
Qty: 21 TABLET | Refills: 0 | Status: SHIPPED | OUTPATIENT
Start: 2022-06-07

## 2022-06-07 RX ORDER — IPRATROPIUM BROMIDE AND ALBUTEROL SULFATE 2.5; .5 MG/3ML; MG/3ML
3 SOLUTION RESPIRATORY (INHALATION)
Status: COMPLETED | OUTPATIENT
Start: 2022-06-07 | End: 2022-06-07

## 2022-06-07 RX ADMIN — IPRATROPIUM BROMIDE AND ALBUTEROL SULFATE 3 ML: 2.5; .5 SOLUTION RESPIRATORY (INHALATION) at 04:16

## 2022-06-07 RX ADMIN — METHYLPREDNISOLONE SODIUM SUCCINATE 125 MG: 125 INJECTION, POWDER, FOR SOLUTION INTRAMUSCULAR; INTRAVENOUS at 04:19

## 2022-06-07 SDOH — ECONOMIC STABILITY - HOUSING INSECURITY: HOMELESSNESS UNSPECIFIED: Z59.00

## 2022-06-07 NOTE — ED PROVIDER NOTES
EMERGENCY DEPARTMENT HISTORY AND PHYSICAL EXAM      Date: 6/7/2022  Patient Name: Ramsey Hinson    History of Presenting Illness     Chief Complaint   Patient presents with    Mental Health Problem    Shortness of Breath       History Provided By: Patient    HPI: Ramsey Hinson, 62 y.o. female with history of asthma, COPD, substance abuse, without baseline O2 requirement presents to the ED with cc of depression, homelessness, and shortness of breath. Symptoms have been present over the past 1 to 2 weeks ever since she was kicked out of her niece's home. She has been homeless over the past 1 to 2 weeks. She does endorse some shortness of breath which she attributes to crack cocaine use. She states that her last crack cocaine use was yesterday. She has also been drinking some liquor and last use was 2 days ago. Denies any fevers, chills, chest pain, abdominal pain, nausea, vomiting, or any other medical complaints. Denies SI, HI, or auditory or visual hallucinations. There are no other complaints, changes, or physical findings at this time. PCP: None    No current facility-administered medications on file prior to encounter. Current Outpatient Medications on File Prior to Encounter   Medication Sig Dispense Refill    sertraline (ZOLOFT) 50 mg tablet Take 1 Tablet by mouth daily. Indications: anxiousness associated with depression (Patient not taking: Reported on 6/7/2022) 30 Tablet 1    budesonide-formoteroL (SYMBICORT) 80-4.5 mcg/actuation HFAA Take 2 Puffs by inhalation two (2) times a day. Indications: controller medication for asthma (Patient not taking: Reported on 6/7/2022) 1 Each 1    traZODone (DESYREL) 100 mg tablet Take 1 Tablet by mouth nightly as needed for Sleep.  Indications: insomnia associated with depression (Patient not taking: Reported on 6/7/2022) 30 Tablet 1    hydrOXYzine HCL (ATARAX) 50 mg tablet Take 1 Tablet by mouth two (2) times daily as needed for Anxiety for up to 60 days. Indications: anxious (Patient not taking: Reported on 6/7/2022) 60 Tablet 1    naltrexone (DEPADE) 50 mg tablet Take 1 Tablet by mouth daily. Indications: alcoholism, prevention of relapse to opioid dependence (Patient not taking: Reported on 6/7/2022) 30 Tablet 1    topiramate (TOPAMAX) 25 mg tablet Take 1 Tablet by mouth two (2) times daily (with meals). Indications: cocaine use disorder (Patient not taking: Reported on 6/7/2022) 60 Tablet 1    [DISCONTINUED] albuterol (PROVENTIL HFA, VENTOLIN HFA, PROAIR HFA) 90 mcg/actuation inhaler Take 2 Puffs by inhalation every four (4) hours as needed for Wheezing. Indications: asthma attack (Patient not taking: Reported on 6/7/2022) 1 Each 1       Past History     Past Medical History:  Past Medical History:   Diagnosis Date    Asthma     [facility]     Emphysema lung ([facility]        Past Surgical History:  History reviewed. No pertinent surgical history. Family History:  History reviewed. No pertinent family history. Social History:  Social History     Tobacco Use    Smoking status: Current Every Day Smoker     Packs/day: 1.00     Years: 37.00     Pack years: 37.00    Smokeless tobacco: Never Used    Tobacco comment: 37 years   Substance Use Topics    Alcohol use: Yes     Alcohol/week: 6.0 standard drinks     Types: 6 Cans of beer per week     Comment: daily    Drug use: Yes     Types: Marijuana, Cocaine     Comment: tonight       Allergies:  No Known Allergies      Review of Systems   Review of Systems   Constitutional: Negative for chills and fever. Respiratory: Positive for cough and shortness of breath. Cardiovascular: Negative for chest pain. Gastrointestinal: Negative for abdominal pain, nausea and vomiting. Genitourinary: Negative. Musculoskeletal: Negative for back pain. Skin: Negative for color change and rash. Neurological: Negative for weakness, light-headedness, numbness and headaches. Psychiatric/Behavioral: Positive for dysphoric mood. Negative for agitation, confusion, hallucinations and sleep disturbance. The patient is not nervous/anxious. All other systems reviewed and are negative. Physical Exam   Physical Exam  Vitals and nursing note reviewed. Constitutional:       General: She is not in acute distress. Appearance: Normal appearance. She is not ill-appearing or toxic-appearing. HENT:      Head: Normocephalic and atraumatic. Nose: Nose normal.      Mouth/Throat:      Mouth: Mucous membranes are moist.   Eyes:      Extraocular Movements: Extraocular movements intact. Pupils: Pupils are equal, round, and reactive to light. Cardiovascular:      Rate and Rhythm: Normal rate and regular rhythm. Heart sounds: No murmur heard. Pulmonary:      Effort: Pulmonary effort is normal. No respiratory distress. Breath sounds: Wheezing present. Comments: No severe respiratory distress but she does have diffuse inspiratory/expiratory wheezes noted throughout all lung fields  Abdominal:      General: There is no distension. Palpations: Abdomen is soft. Tenderness: There is no abdominal tenderness. There is no guarding or rebound. Musculoskeletal:         General: No swelling or tenderness. Normal range of motion. Cervical back: Normal range of motion and neck supple. Right lower leg: No edema. Left lower leg: No edema. Skin:     General: Skin is warm and dry. Coloration: Skin is not pale. Findings: No erythema. Neurological:      General: No focal deficit present. Mental Status: She is alert and oriented to person, place, and time. Diagnostic Study Results     Labs -   No results found for this or any previous visit (from the past 12 hour(s)).     Radiologic Studies -   XR CHEST PORT   Final Result   No acute cardiopulmonary process        CT Results  (Last 48 hours)    None        CXR Results  (Last 48 hours)               06/07/22 0456  XR CHEST PORT Final result    Impression:  No acute cardiopulmonary process       Narrative:  EXAM: XR CHEST PORT       INDICATION: SOB       COMPARISON: 2/22/2022       FINDINGS: A portable AP radiograph of the chest was obtained at 440 hours. Cardiac monitoring leads are noted. . The lungs are clear. The cardiac and   mediastinal contours and pulmonary vascularity are normal.  The bones and soft   tissues are grossly within normal limits. Medical Decision Making   I am the first provider for this patient. I reviewed the vital signs, available nursing notes, past medical history, past surgical history, family history and social history. Vital Signs-Reviewed the patient's vital signs. Patient Vitals for the past 12 hrs:   Temp Pulse Resp BP SpO2   06/07/22 0500 -- 91 18 (!) 182/125 93 %   06/07/22 0430 -- 84 17 (!) 133/97 95 %   06/07/22 0415 -- -- -- -- 94 %   06/07/22 0256 98.2 °F (36.8 °C) -- 18 -- --   06/07/22 0252 -- -- -- (!) 159/109 --   06/07/22 0249 -- 88 -- -- 95 %       Records Reviewed: Nursing Notes    Provider Notes (Medical Decision Making):   51-year-old female here with complaints of depression, shortness of breath, homelessness. Afebrile and vital signs stable. No hypoxia but she does have inspiratory and expiratory wheezes noted throughout all lung fields consistent with history of COPD. No need for oxygen requirements at this time. Will evaluate with chest x-ray, blood work, EKG, and treat with steroids and breathing treatment. We will consult BSMART although she does not appear to be an immediate danger to herself or others. .      ED Course:   Initial assessment performed. The patients presenting problems have been discussed, and they are in agreement with the care plan formulated and outlined with them. I have encouraged them to ask questions as they arise throughout their visit.     ED Course as of 06/07/22 0532   Tue Jun 07, 2022   7902 Patient refusing blood work at this time. I will change IV Solu-Medrol over to intramuscular [AK]   0412 EKG per my interpretation normal sinus rhythm, rate 79 bpm, normal axis, no acute ischemic changes or interval changes. [AK]   0430 Patient has been evaluated by Halima Puri. Patient does not meet inpatient behavioral health admission requirements. She will be provided resources for housing and substance abuse treatment. I will treat her COPD with breathing treatments, IM steroids, obtain chest x-ray, and plan for discharge with medications. No need for medical admission today. [AK]      ED Course User Index  [AK] Desean Rich MD     Discharge Note:  The patient has been re-evaluated and is ready for discharge. Reviewed available results with patient. Counseled patient on diagnosis and care plan. Patient has expressed understanding, and all questions have been answered. Patient agrees with plan and agrees to follow up as recommended, or to return to the ED if their symptoms worsen. Discharge instructions have been provided and explained to the patient, along with reasons to return to the ED. Disposition:  Discharge    DISCHARGE PLAN:  1. Current Discharge Medication List      START taking these medications    Details   predniSONE (STERAPRED DS) 10 mg dose pack Take by mouth as directed until completion. Qty: 21 Tablet, Refills: 0  Start date: 6/7/2022         CONTINUE these medications which have CHANGED    Details   albuterol (PROVENTIL HFA, VENTOLIN HFA, PROAIR HFA) 90 mcg/actuation inhaler Take 2 Puffs by inhalation every four (4) hours as needed for Wheezing. Indications: asthma attack  Qty: 1 Each, Refills: 1  Start date: 6/7/2022           2.    Follow-up Information     Follow up With Specialties Details Why [address] Internal Medicine Schedule an appointment as soon as possible for a visit  to establish with a PCP [address]    [address]  Go to   [address] [place] [address], [address] [facility], [facility], [address]  Call   [address] [facility] [address] [phone]        3. Return to ED if worse     Diagnosis     Clinical Impression:   1. Depression, unspecified depression type    2. Homelessness    3. Acute exacerbation of chronic obstructive pulmonary disease (COPD) ([facility])        Attestations:  I am the first and primary provider of record for this patient's ED encounter. I personally performed the services described above in this documentation. Rodrigo Sainz MD    Please note that this dictation was completed with [employer], the [employer] voice recognition software. Quite often unanticipated grammatical, syntax, homophones, and other interpretive errors are inadvertently transcribed by the computer software. Please disregard these errors. Please excuse any errors that have escaped final proofreading. Thank you.

## 2022-06-07 NOTE — ED NOTES
Discharge instructions were given to the patient by Surjit Morillo RN. The patient left the Emergency Department ambulatory, alert and oriented and in no acute distress with 2 prescriptions. The patient was encouraged to call or return to the ED for worsening issues or problems and was encouraged to schedule a follow up appointment for continuing care. The patient verbalized understanding of discharge instructions and prescriptions, all questions were answered. The patient has no further concerns at this time.

## 2022-06-07 NOTE — BSMART NOTE
Comprehensive Assessment Form Part 1      Section I - Disposition    Axis I - Adjustment Mood Disorder with depressed mood                Polysubstance Use Disorder                Alcohol Use Disorder      The Medical Doctor to Psychiatrist conference was not completed. The Medical Doctor is in agreement with Psychiatrist disposition because of (reason) patient does not meet criteria for admission. The plan is discharge with [facility] same day access and Crisis Stablization. The on-call Psychiatrist consulted was Dr. Cassandra French. The admitting Psychiatrist will be Dr. Cassandra French. The admitting Diagnosis is none. The Payor source is [facility] [place]ICA[place] [facility]. The name of the representative was . This was approved for  days. The authorization number is    This writer reviewed the [facility] Suicide Severity Rating Scale in nursing flowsheet and the risk level assigned is no risk. Based on this assessment, the risk of suicide is low risk and the plan is discharge with resources for [place] and Crisis Stablization. .     Section II - Integrated Summary  Summary:  Per Venus: Pt presents to ED ambulatory accompanied by EMS complaining of depression x 1 week after her niece kicked her out of the house. Pt noted to be covered in dirt and has soiled herself. Pt given wash cloths, soap, lotion, toothbrush & tooth paste, sani wipes, towels, and paper scrubs so she may clean herself up. Pt reports she is scared on the streets and wants help with her depression and substance abuse problems. Pt denies SI/HI. Pt reports she needs help getting her medications but cannot tell this RN what she takes. Pt reports she was recently admitted to [facility] in April 2022. At bedside, patient denied suicidal, homicidal thoughts and hallucinations. Patient reported a medication overdose about 20 years ago. Patient denied any current plans to harm herself and any attempts.  Patient reported she was staying with her niece and reported she got upset with her because she went somewhere and she also told her niece that her friends    The patienthas demonstrated mental capacity to provide informed consent. The information is given by the patient and past medical records. The Chief Complaint is depression and chest pain. The Precipitant Factors are COPD, substance use, homeless. Previous Hospitalizations: yes  The patient has not previously been in restraints. Current Psychiatrist and/or  is none reported. Lethality Assessment:    The potential for suicide noted by the following: not noted, over 20 years attempt an overdose . The potential for homicide is not noted. The patient has not been a perpetrator of sexual or physical abuse. There are not pending charges. The patient is not felt to be at risk for self harm or harm to others. The attending nurse was advised not noted. Section III - Psychosocial  The patient's overall mood and attitude is fatigued, depressed. Feelings of helplessness and hopelessness are not observed. Generalized anxiety is not observed. Panic is not observed. Phobias are not observed. Obsessive compulsive tendencies are not observed. Section IV - Mental Status Exam  The patient's appearance shows no evidence of impairment. The patient's behavior shows no evidence of impairment. The patient is oriented to time, place, person and situation. The patient's speech shows no evidence of impairment. The patient's mood is depressed. The range of affect shows no evidence of impairment. The patient's thought content demonstrates no evidence of impairment. The thought process shows no evidence of impairment. The patient's perception shows no evidence of impairment. The patient's memory shows no evidence of impairment. The patient's appetite shows no evidence of impairment. The patient's sleep has evidence of insomnia. The patient's insight shows no evidence of impairment.   The patient's judgement shows no evidence of impairment. Section V - Substance Abuse  The patient is using substances. The patient is using tobacco by inhalation for greater than 10 years with last use on yesterday, alcohol for greater than 10 years with last use on yesterday and cocaine by inhalation for greater than 10 years with last use on couple of days. The patient has experienced the following withdrawal symptoms: N/A. Section VI - Living Arrangements  The patient is single. The patient lives alone. The patient has adult children. The patient does plan to return home upon discharge. The patient does not have legal issues pending. The patient's source of income comes from unknown. Taoism and cultural practices have not been voiced at this time. The patient's greatest support comes from no one reported and this person will not be involved with the treatment. The patient has not been in an event described as horrible or outside the realm of ordinary life experience either currently or in the past.  The patient has not been a victim of sexual/physical abuse. Section VII - Other Areas of Clinical Concern  The highest grade achieved is not assessed with the overall quality of school experience being described as not assessed. The patient is currently unemployed and speaks [place] as a primary language. The patient has no communication impairments affecting communication. The patient's preference for learning can be described as: can read and write adequately.   The patient's hearing is normal.  The patient's vision is normal.      Otilia Armas

## 2022-06-07 NOTE — DISCHARGE INSTRUCTIONS
Substance Abuse and Addiction Resources    [facility]  [phone]  Closed meeting- family members that have been affected bysomeone alcohol abuse  Open meeting everyone can attend. Alcoholic's Anonymous [phone]  Non professional (alcoholics in recovery helping others)  Hold Meetings (talk about sobriety, have desire)  No charge    Lai Hillman is the Treatment Consultant in the [facility]  Free one-on-one phone consultation and insurance verification  12 step based meetings and philosophy  Family programs and sessions    [facility] [phone]  Free individual assessment  Intensive outpatient outpatient program  Ambulatory withdrawal management/detoxification  Insurance required    [employer]  [phone] [facility] location), [phone] ([place] location)  An Office Based Opioid Treatment Program  Individual and Group therapy, Peer Recovery Services and Care Coordination  Accepting Medicare, Medicaid and Commercial/private insurance  $200 a month self-pay program (does not include medicine or outside labs)  [address] [address] (Monday - Friday, 9a-5p. Standing [facility] ED referral appointment 10:00-11:00 Monday)  [address] [place] [address] (Tuesday - Friday, 9a-5p Standing [facility] ED referral appointment 10:00-11:00 Tuesday - Friday)    [facility] [phone] ext [phone]  Good for patients with no insurance, Medicaid, Medicare  Sliding fee scale available for self pay  Patients go Monday-Friday from 8-4:30 to central intake for a shelter and then to [facility] for services  Offers a variety of services I.e.  Laundry, shower, eye clinic, medical clinic, dental, substance abuse services, case management, etc.    [facility] [phone]  Make appointment with counselor  $22 fee for initial hour intake    [address] [address] [phone]  Offers Detox and Inpatient Treatment for men only. Social detox  Is a homeless shelter with longterm 12 step program. Can choose just access to the hshelter component  Must be able to walk <1miles one way to attend classes, be highly motivated and willing to complete all 12 steps. 8 months- 1 year is the typical length of stay if accepted    [facility] [place] [phone]  For residents of [facility] AND [facility]  They offer outpatient treatment and can make referrals for inpatient careBased on income. Will [employer]. Accept Medicaid. They have a walk in clinic that patients can go to be screened for services. Two on the [facility] and [place] side of [facility]:   [phone], [place] [address] (near [facility]). Walk in hours: Mon or Wed       12:30pm - Lorraine [address]. Walk in hours: Tuesday 12:30pm 3pm Wed       8:30am- [address] [place] [address] [phone] [facility] location)   [facility] [place] [address]  Alcohol detox and rehabilitation  Opioid and other drug rehabilitation, Medication assisted treatment  Accepts commercial/private insurance  Call or email Mary Schilling: [phone], [email or web address] to schedule an appointment    [place] [phone]  $3500 entry fee  12 step meeting plan  No sex offenders accepted. Must get a job within 30 days after admission  After the 12 week, additional $125/week to stay    Narcotic Anonymous [phone], [phone]  Will refer to [facility] into Triage (takes 10-15 minutes)  Proof of [facility] residence with Picture ID  Medicaid and Self Pay. NO Private insurance    [place] [phone]  Referrals come from organizations like [facility], [employer], [facility]. Must be self pay. No insurance allowed. No patients on prescribed narcotics. No sex offenders.   Need all medical mental health information and medication list sent prior to admit. Surekha Paulson [phone] ext [phone] between 21-65  Need social security card and picture ID  Must pass breath test and 10 panel Urine Drug Screen  No Sex Offenders or Psychiatric patients  Must not have been a 3x repeat at this facility  6 month in house- has to participate in work therapy 40 hours/week  Participates in spiritual classes, bible study and Amish    [facility] [address], sent by [employer]  No Sex [address] (by [address])    [facility]  [phone]  Monday through Friday 8:30am to 5:00pm  Brief Telephone Interview. Then will be scheduled for next substance abuse services orientation group and then scheduled for intake interview. [facility]  [phone]  Walk in Monday through Friday 9:00AM to 3:00PM  Bring Picture ID, Proof of residence (piece of mail), Proof of Insurance ([address] scale), Proof of income (any)    [address] [phone]  Walk in then Assessment by licensed professional   [place] office ([address] [facility]) Monday, Tuesday, Thursday  9AM to [address] [facility] office ([address] [address] [place], [address]) [facility]  [phone]  Walk In Monday to Friday starting at [address] [facility], Proof of residence (piece of mail), Proof of insurance (Medicaid/sliding scale)  At 9AM is the Substance Abuse Services Orientation Group and then scheduled for Intake Evaluation.

## 2022-06-07 NOTE — ED NOTES
Pt presents to ED ambulatory accompanied by EMS complaining of depression x 1 week after her niece kicked her out of the house. Pt noted to be covered in dirt and has soiled herself. Pt given wash cloths, soap, lotion, toothbrush & tooth paste, sani wipes, towels, and paper scrubs so she may clean herself up. Pt reports she is scared on the streets and wants help with her depression and substance abuse problems. Pt denies SI/HI. Pt reports she needs help getting her medications but cannot tell this RN what she takes. Pt reports she was recently admitted to [facility] in April 2022. Pt also reports SOB and cough x 1 week due to crack cocaine smoking. Pt not noted to be in respiratory distress, 95% on RA. Pt is alert and oriented x 4, skin is warm and dry. Assessment completed and pt updated on plan of care. Call bell in reach. Emergency Department Nursing Plan of Care       The Nursing Plan of Care is developed from the Nursing assessment and Emergency Department Attending provider initial evaluation. The plan of care may be reviewed in the ED Provider note.     The Plan of Care was developed with the following considerations:   Patient / Family readiness to learn indicated by:verbalized understanding  Persons(s) to be included in education: patient  Barriers to Learning/Limitations:No    Signed     Eduar Tellez    6/7/2022   3:00 AM

## 2022-06-07 NOTE — ED NOTES
Pt will not sit still for blood draw. Pt screams and pulls away. Pt yelling \"come on, come on! \" and flailing arm around. Pt instructed to please sit still while this RN attmepting to place IV. Pt verbalizes she will stay still but then yells and flails when this RN inserts needle. 3 unsuccessful attempts. MD made aware. MD orders to hold off on labs and give steroid IM.

## 2022-06-08 LAB
ATRIAL RATE: 79 BPM
CALCULATED P AXIS, ECG09: 78 DEGREES
CALCULATED R AXIS, ECG10: 73 DEGREES
CALCULATED T AXIS, ECG11: 64 DEGREES
DIAGNOSIS, 93000: NORMAL
P-R INTERVAL, ECG05: 174 MS
Q-T INTERVAL, ECG07: 388 MS
QRS DURATION, ECG06: 92 MS
QTC CALCULATION (BEZET), ECG08: 444 MS
VENTRICULAR RATE, ECG03: 79 BPM

## 2023-08-31 ENCOUNTER — HOSPITAL ENCOUNTER (EMERGENCY)
Facility: HOSPITAL | Age: 58
Discharge: HOME | End: 2023-08-31
Payer: MEDICAID

## 2023-08-31 ENCOUNTER — APPOINTMENT (OUTPATIENT)
Facility: HOSPITAL | Age: 58
End: 2023-08-31
Payer: MEDICAID

## 2023-08-31 VITALS
HEIGHT: 61 IN | BODY MASS INDEX: 33.99 KG/M2 | OXYGEN SATURATION: 96 % | SYSTOLIC BLOOD PRESSURE: 147 MMHG | TEMPERATURE: 98.5 F | RESPIRATION RATE: 20 BRPM | WEIGHT: 180 LBS | HEART RATE: 68 BPM | DIASTOLIC BLOOD PRESSURE: 90 MMHG

## 2023-08-31 DIAGNOSIS — G44.209 TENSION HEADACHE: ICD-10-CM

## 2023-08-31 DIAGNOSIS — J44.1 COPD EXACERBATION (HCC): Primary | ICD-10-CM

## 2023-08-31 DIAGNOSIS — F41.1 ANXIETY STATE: ICD-10-CM

## 2023-08-31 LAB
ALBUMIN SERPL-MCNC: 3 G/DL (ref 3.5–5)
ALBUMIN/GLOB SERPL: 0.8 (ref 1.1–2.2)
ALP SERPL-CCNC: 46 U/L (ref 45–117)
ALT SERPL-CCNC: 9 U/L (ref 12–78)
ANION GAP SERPL CALC-SCNC: 6 MMOL/L (ref 5–15)
AST SERPL W P-5'-P-CCNC: 24 U/L (ref 15–37)
BASOPHILS # BLD: 0 K/UL (ref 0–0.1)
BASOPHILS NFR BLD: 0 % (ref 0–1)
BILIRUB SERPL-MCNC: 0.4 MG/DL (ref 0.2–1)
BNP SERPL-MCNC: 162 PG/ML
BUN SERPL-MCNC: 5 MG/DL (ref 6–20)
BUN/CREAT SERPL: 7 (ref 12–20)
CA-I BLD-MCNC: 8.9 MG/DL (ref 8.5–10.1)
CHLORIDE SERPL-SCNC: 103 MMOL/L (ref 97–108)
CO2 SERPL-SCNC: 34 MMOL/L (ref 21–32)
CREAT SERPL-MCNC: 0.7 MG/DL (ref 0.55–1.02)
DIFFERENTIAL METHOD BLD: ABNORMAL
EOSINOPHIL # BLD: 0 K/UL (ref 0–0.4)
EOSINOPHIL NFR BLD: 0 % (ref 0–7)
ERYTHROCYTE [DISTWIDTH] IN BLOOD BY AUTOMATED COUNT: 15.3 % (ref 11.5–14.5)
GLOBULIN SER CALC-MCNC: 3.7 G/DL (ref 2–4)
GLUCOSE SERPL-MCNC: 120 MG/DL (ref 65–100)
HCT VFR BLD AUTO: 36.5 % (ref 35–47)
HGB BLD-MCNC: 11.7 G/DL (ref 11.5–16)
IMM GRANULOCYTES # BLD AUTO: 0 K/UL (ref 0–0.04)
IMM GRANULOCYTES NFR BLD AUTO: 0 % (ref 0–0.5)
LIPASE SERPL-CCNC: 19 U/L (ref 73–393)
LYMPHOCYTES # BLD: 1.6 K/UL (ref 0.8–3.5)
LYMPHOCYTES NFR BLD: 22 % (ref 12–49)
MCH RBC QN AUTO: 29.5 PG (ref 26–34)
MCHC RBC AUTO-ENTMCNC: 32.1 G/DL (ref 30–36.5)
MCV RBC AUTO: 92.2 FL (ref 80–99)
MONOCYTES # BLD: 0.5 K/UL (ref 0–1)
MONOCYTES NFR BLD: 7 % (ref 5–13)
NEUTS SEG # BLD: 5 K/UL (ref 1.8–8)
NEUTS SEG NFR BLD: 71 % (ref 32–75)
PLATELET # BLD AUTO: 242 K/UL (ref 150–400)
PMV BLD AUTO: 10.5 FL (ref 8.9–12.9)
POTASSIUM SERPL-SCNC: 3.8 MMOL/L (ref 3.5–5.1)
PROT SERPL-MCNC: 6.7 G/DL (ref 6.4–8.2)
RBC # BLD AUTO: 3.96 M/UL (ref 3.8–5.2)
SODIUM SERPL-SCNC: 143 MMOL/L (ref 136–145)
TROPONIN I SERPL HS-MCNC: 8 NG/L (ref 0–51)
WBC # BLD AUTO: 7.2 K/UL (ref 3.6–11)

## 2023-08-31 PROCEDURE — 93005 ELECTROCARDIOGRAM TRACING: CPT | Performed by: PHYSICIAN ASSISTANT

## 2023-08-31 PROCEDURE — 84484 ASSAY OF TROPONIN QUANT: CPT

## 2023-08-31 PROCEDURE — 6360000002 HC RX W HCPCS: Performed by: PHYSICIAN ASSISTANT

## 2023-08-31 PROCEDURE — 80053 COMPREHEN METABOLIC PANEL: CPT

## 2023-08-31 PROCEDURE — 85025 COMPLETE CBC W/AUTO DIFF WBC: CPT

## 2023-08-31 PROCEDURE — 71046 X-RAY EXAM CHEST 2 VIEWS: CPT

## 2023-08-31 PROCEDURE — 2580000003 HC RX 258: Performed by: PHYSICIAN ASSISTANT

## 2023-08-31 PROCEDURE — 36415 COLL VENOUS BLD VENIPUNCTURE: CPT

## 2023-08-31 PROCEDURE — 96375 TX/PRO/DX INJ NEW DRUG ADDON: CPT

## 2023-08-31 PROCEDURE — 83880 ASSAY OF NATRIURETIC PEPTIDE: CPT

## 2023-08-31 PROCEDURE — 96374 THER/PROPH/DIAG INJ IV PUSH: CPT

## 2023-08-31 PROCEDURE — 99285 EMERGENCY DEPT VISIT HI MDM: CPT

## 2023-08-31 PROCEDURE — 94640 AIRWAY INHALATION TREATMENT: CPT

## 2023-08-31 PROCEDURE — 83690 ASSAY OF LIPASE: CPT

## 2023-08-31 PROCEDURE — 6370000000 HC RX 637 (ALT 250 FOR IP): Performed by: PHYSICIAN ASSISTANT

## 2023-08-31 RX ORDER — PREDNISONE 20 MG/1
60 TABLET ORAL DAILY
Qty: 15 TABLET | Refills: 0 | Status: SHIPPED | OUTPATIENT
Start: 2023-08-31 | End: 2023-09-05

## 2023-08-31 RX ORDER — KETOROLAC TROMETHAMINE 15 MG/ML
15 INJECTION, SOLUTION INTRAMUSCULAR; INTRAVENOUS ONCE
Status: COMPLETED | OUTPATIENT
Start: 2023-08-31 | End: 2023-08-31

## 2023-08-31 RX ORDER — ACETAMINOPHEN 500 MG
1000 TABLET ORAL
Status: COMPLETED | OUTPATIENT
Start: 2023-08-31 | End: 2023-08-31

## 2023-08-31 RX ORDER — DEXTROMETHORPHAN HBR. AND GUAIFENESIN 10; 100 MG/5ML; MG/5ML
10 SOLUTION ORAL EVERY 4 HOURS PRN
Qty: 118 ML | Refills: 0 | Status: SHIPPED | OUTPATIENT
Start: 2023-08-31

## 2023-08-31 RX ORDER — 0.9 % SODIUM CHLORIDE 0.9 %
500 INTRAVENOUS SOLUTION INTRAVENOUS
Status: COMPLETED | OUTPATIENT
Start: 2023-08-31 | End: 2023-08-31

## 2023-08-31 RX ORDER — IPRATROPIUM BROMIDE AND ALBUTEROL SULFATE 2.5; .5 MG/3ML; MG/3ML
1 SOLUTION RESPIRATORY (INHALATION)
Status: COMPLETED | OUTPATIENT
Start: 2023-08-31 | End: 2023-08-31

## 2023-08-31 RX ORDER — ALBUTEROL SULFATE 90 UG/1
2 AEROSOL, METERED RESPIRATORY (INHALATION) EVERY 4 HOURS PRN
Qty: 18 G | Refills: 0 | Status: SHIPPED | OUTPATIENT
Start: 2023-08-31

## 2023-08-31 RX ADMIN — SODIUM CHLORIDE 500 ML: 9 INJECTION, SOLUTION INTRAVENOUS at 15:47

## 2023-08-31 RX ADMIN — KETOROLAC TROMETHAMINE 15 MG: 15 INJECTION, SOLUTION INTRAMUSCULAR; INTRAVENOUS at 18:01

## 2023-08-31 RX ADMIN — IPRATROPIUM BROMIDE AND ALBUTEROL SULFATE 1 DOSE: 2.5; .5 SOLUTION RESPIRATORY (INHALATION) at 15:50

## 2023-08-31 RX ADMIN — IPRATROPIUM BROMIDE AND ALBUTEROL SULFATE 1 DOSE: 2.5; .5 SOLUTION RESPIRATORY (INHALATION) at 18:47

## 2023-08-31 RX ADMIN — ACETAMINOPHEN 1000 MG: 500 TABLET ORAL at 15:54

## 2023-08-31 RX ADMIN — WATER 125 MG: 1 INJECTION INTRAMUSCULAR; INTRAVENOUS; SUBCUTANEOUS at 15:49

## 2023-08-31 ASSESSMENT — PAIN SCALES - GENERAL: PAINLEVEL_OUTOF10: 10

## 2023-08-31 ASSESSMENT — PAIN - FUNCTIONAL ASSESSMENT: PAIN_FUNCTIONAL_ASSESSMENT: 0-10

## 2023-08-31 NOTE — ED PROVIDER NOTES
[facility] EMERGENCY DEPT  EMERGENCY DEPARTMENT HISTORY AND PHYSICAL EXAM      Date: 8/31/2023  Patient Name: Josh Chatman  MRN: [id number]  [facility] 1965  Date of evaluation: 8/31/2023  Provider: Cynthia Garcia PA-C   Note Started: 3:09 PM EDT 8/31/23    HISTORY OF PRESENT ILLNESS     Chief Complaint   Patient presents with    Abdominal Pain    Headache    Shortness of Breath       History Provided By: Patient    HPI: Josh Chatman is a 62 y.o. female with a past medical history significant for asthma and COPD who presents this ED with CC of chest tightness and shortness of breath. Patient reports a 4-day history of cough, congestion, chest tightness, shortness of breath, and wheezing. States that she just moved to this area from [place] and feels that this changes exacerbated her underlying asthma/COPD. Patient denies having primary care in this area established and is out of her regular medications. She denies ever requiring intubation or ICU admission secondary to asthma exacerbation. Patient also with complaints of vague generalized abdominal pain and headache. Denies any modifying factors of this complaint. Denies any nausea, vomiting, diarrhea, changes in bowel or bladder habits. No fevers or chills. Denies any palpitations, leg swelling, lightheadedness, dizziness, numbness, weakness, syncope or near syncope, diaphoresis, rash. States that she is otherwise well and has no further concerns. PAST MEDICAL HISTORY   Past Medical History:  Past Medical History:   Diagnosis Date    Asthma     COPD (chronic obstructive pulmonary disease) ([address] )     Emphysema lung ([address])        Past Surgical History:  History reviewed. No pertinent surgical history. Family History:  History reviewed. No pertinent family history.     Social History:  Social History     Tobacco Use    Smoking status: Every Day     Packs/day: 1.00     Types: Cigarettes    Smokeless tobacco: Never    Tobacco comments:     Quit

## 2023-08-31 NOTE — ED TRIAGE NOTES
Pt c/o head and stomach hurting and SOB. Noted exp wheezing. Head pain from a spot on her brain they found about 2 weeks ago. ..  Doesn't have a PCP

## 2023-09-01 LAB
EKG ATRIAL RATE: 68 BPM
EKG DIAGNOSIS: NORMAL
EKG P AXIS: 54 DEGREES
EKG P-R INTERVAL: 162 MS
EKG Q-T INTERVAL: 388 MS
EKG QRS DURATION: 90 MS
EKG QTC CALCULATION (BAZETT): 412 MS
EKG R AXIS: 52 DEGREES
EKG T AXIS: 40 DEGREES
EKG VENTRICULAR RATE: 68 BPM

## 2023-09-02 ENCOUNTER — HOSPITAL ENCOUNTER (EMERGENCY)
Facility: HOSPITAL | Age: 58
Discharge: HOME | End: 2023-09-03
Attending: EMERGENCY MEDICINE
Payer: MEDICAID

## 2023-09-02 DIAGNOSIS — E87.6 HYPOKALEMIA: ICD-10-CM

## 2023-09-02 DIAGNOSIS — J45.901 ACUTE EXACERBATION OF COPD WITH ASTHMA (HCC): Primary | ICD-10-CM

## 2023-09-02 DIAGNOSIS — J44.1 ACUTE EXACERBATION OF COPD WITH ASTHMA (HCC): Primary | ICD-10-CM

## 2023-09-02 DIAGNOSIS — J44.1 COPD EXACERBATION (HCC): ICD-10-CM

## 2023-09-02 PROCEDURE — 96365 THER/PROPH/DIAG IV INF INIT: CPT

## 2023-09-02 PROCEDURE — 99285 EMERGENCY DEPT VISIT HI MDM: CPT

## 2023-09-02 PROCEDURE — 96375 TX/PRO/DX INJ NEW DRUG ADDON: CPT

## 2023-09-02 PROCEDURE — 96366 THER/PROPH/DIAG IV INF ADDON: CPT

## 2023-09-02 ASSESSMENT — PAIN SCALES - GENERAL: PAINLEVEL_OUTOF10: 10

## 2023-09-02 ASSESSMENT — PAIN DESCRIPTION - LOCATION
LOCATION: ABDOMEN
LOCATION: HEAD

## 2023-09-02 ASSESSMENT — PAIN DESCRIPTION - DESCRIPTORS: DESCRIPTORS: DULL

## 2023-09-02 ASSESSMENT — PAIN - FUNCTIONAL ASSESSMENT: PAIN_FUNCTIONAL_ASSESSMENT: 0-10

## 2023-09-03 ENCOUNTER — APPOINTMENT (OUTPATIENT)
Facility: HOSPITAL | Age: 58
End: 2023-09-03
Payer: MEDICAID

## 2023-09-03 VITALS
OXYGEN SATURATION: 98 % | BODY MASS INDEX: 33.99 KG/M2 | TEMPERATURE: 97.5 F | DIASTOLIC BLOOD PRESSURE: 95 MMHG | WEIGHT: 180 LBS | HEIGHT: 61 IN | SYSTOLIC BLOOD PRESSURE: 148 MMHG | RESPIRATION RATE: 16 BRPM | HEART RATE: 74 BPM

## 2023-09-03 LAB
ALBUMIN SERPL-MCNC: 3.1 G/DL (ref 3.5–5)
ALBUMIN/GLOB SERPL: 0.9 (ref 1.1–2.2)
ALP SERPL-CCNC: 52 U/L (ref 45–117)
ALT SERPL-CCNC: 16 U/L (ref 12–78)
ANION GAP SERPL CALC-SCNC: 5 MMOL/L (ref 5–15)
AST SERPL W P-5'-P-CCNC: 11 U/L (ref 15–37)
BASOPHILS # BLD: 0 K/UL (ref 0–0.1)
BASOPHILS NFR BLD: 0 % (ref 0–1)
BILIRUB SERPL-MCNC: 0.4 MG/DL (ref 0.2–1)
BUN SERPL-MCNC: 6 MG/DL (ref 6–20)
BUN/CREAT SERPL: 8 (ref 12–20)
CA-I BLD-MCNC: 8 MG/DL (ref 8.5–10.1)
CHLORIDE SERPL-SCNC: 103 MMOL/L (ref 97–108)
CO2 SERPL-SCNC: 33 MMOL/L (ref 21–32)
CREAT SERPL-MCNC: 0.74 MG/DL (ref 0.55–1.02)
DIFFERENTIAL METHOD BLD: ABNORMAL
EKG ATRIAL RATE: 85 BPM
EKG DIAGNOSIS: NORMAL
EKG P-R INTERVAL: 156 MS
EKG Q-T INTERVAL: 394 MS
EKG QRS DURATION: 102 MS
EKG QTC CALCULATION (BAZETT): 468 MS
EKG R AXIS: 212 DEGREES
EKG T AXIS: 185 DEGREES
EKG VENTRICULAR RATE: 85 BPM
EOSINOPHIL # BLD: 0 K/UL (ref 0–0.4)
EOSINOPHIL NFR BLD: 0 % (ref 0–7)
ERYTHROCYTE [DISTWIDTH] IN BLOOD BY AUTOMATED COUNT: 15.5 % (ref 11.5–14.5)
GLOBULIN SER CALC-MCNC: 3.5 G/DL (ref 2–4)
GLUCOSE SERPL-MCNC: 121 MG/DL (ref 65–100)
HCT VFR BLD AUTO: 37.3 % (ref 35–47)
HGB BLD-MCNC: 12.3 G/DL (ref 11.5–16)
IMM GRANULOCYTES # BLD AUTO: 0 K/UL (ref 0–0.04)
IMM GRANULOCYTES NFR BLD AUTO: 0 % (ref 0–0.5)
LYMPHOCYTES # BLD: 2 K/UL (ref 0.8–3.5)
LYMPHOCYTES NFR BLD: 32 % (ref 12–49)
MAGNESIUM SERPL-MCNC: 1.6 MG/DL (ref 1.6–2.4)
MCH RBC QN AUTO: 29.4 PG (ref 26–34)
MCHC RBC AUTO-ENTMCNC: 33 G/DL (ref 30–36.5)
MCV RBC AUTO: 89 FL (ref 80–99)
MONOCYTES # BLD: 0.4 K/UL (ref 0–1)
MONOCYTES NFR BLD: 7 % (ref 5–13)
NEUTS SEG # BLD: 3.8 K/UL (ref 1.8–8)
NEUTS SEG NFR BLD: 61 % (ref 32–75)
NRBC # BLD: 0 K/UL (ref 0–0.01)
NRBC BLD-RTO: 0 PER 100 WBC
PLATELET # BLD AUTO: 289 K/UL (ref 150–400)
PMV BLD AUTO: 10.2 FL (ref 8.9–12.9)
POTASSIUM SERPL-SCNC: 2.6 MMOL/L (ref 3.5–5.1)
POTASSIUM SERPL-SCNC: 3.2 MMOL/L (ref 3.5–5.1)
PROT SERPL-MCNC: 6.6 G/DL (ref 6.4–8.2)
RBC # BLD AUTO: 4.19 M/UL (ref 3.8–5.2)
SODIUM SERPL-SCNC: 141 MMOL/L (ref 136–145)
WBC # BLD AUTO: 6.2 K/UL (ref 3.6–11)

## 2023-09-03 PROCEDURE — 85025 COMPLETE CBC W/AUTO DIFF WBC: CPT

## 2023-09-03 PROCEDURE — 96375 TX/PRO/DX INJ NEW DRUG ADDON: CPT

## 2023-09-03 PROCEDURE — 6370000000 HC RX 637 (ALT 250 FOR IP): Performed by: EMERGENCY MEDICINE

## 2023-09-03 PROCEDURE — 94640 AIRWAY INHALATION TREATMENT: CPT

## 2023-09-03 PROCEDURE — 80053 COMPREHEN METABOLIC PANEL: CPT

## 2023-09-03 PROCEDURE — 83735 ASSAY OF MAGNESIUM: CPT

## 2023-09-03 PROCEDURE — 93005 ELECTROCARDIOGRAM TRACING: CPT | Performed by: EMERGENCY MEDICINE

## 2023-09-03 PROCEDURE — 71045 X-RAY EXAM CHEST 1 VIEW: CPT

## 2023-09-03 PROCEDURE — 6360000002 HC RX W HCPCS: Performed by: EMERGENCY MEDICINE

## 2023-09-03 PROCEDURE — 96366 THER/PROPH/DIAG IV INF ADDON: CPT

## 2023-09-03 PROCEDURE — 84132 ASSAY OF SERUM POTASSIUM: CPT

## 2023-09-03 PROCEDURE — 96365 THER/PROPH/DIAG IV INF INIT: CPT

## 2023-09-03 PROCEDURE — 2580000003 HC RX 258: Performed by: EMERGENCY MEDICINE

## 2023-09-03 PROCEDURE — 36415 COLL VENOUS BLD VENIPUNCTURE: CPT

## 2023-09-03 RX ORDER — AZITHROMYCIN 250 MG/1
250 TABLET, FILM COATED ORAL SEE ADMIN INSTRUCTIONS
Qty: 6 TABLET | Refills: 0 | Status: SHIPPED | OUTPATIENT
Start: 2023-09-03 | End: 2023-09-08

## 2023-09-03 RX ORDER — ALBUTEROL SULFATE 90 UG/1
2 AEROSOL, METERED RESPIRATORY (INHALATION) 4 TIMES DAILY PRN
Qty: 18 G | Refills: 0 | Status: SHIPPED | OUTPATIENT
Start: 2023-09-03

## 2023-09-03 RX ORDER — POTASSIUM CHLORIDE 750 MG/1
40 TABLET, FILM COATED, EXTENDED RELEASE ORAL
Status: COMPLETED | OUTPATIENT
Start: 2023-09-03 | End: 2023-09-03

## 2023-09-03 RX ORDER — IPRATROPIUM BROMIDE AND ALBUTEROL SULFATE 2.5; .5 MG/3ML; MG/3ML
2 SOLUTION RESPIRATORY (INHALATION)
Status: COMPLETED | OUTPATIENT
Start: 2023-09-03 | End: 2023-09-03

## 2023-09-03 RX ORDER — PREDNISONE 20 MG/1
60 TABLET ORAL DAILY
Qty: 15 TABLET | Refills: 0 | Status: SHIPPED | OUTPATIENT
Start: 2023-09-03 | End: 2023-09-08

## 2023-09-03 RX ORDER — GUAIFENESIN/DEXTROMETHORPHAN 100-10MG/5
5 SYRUP ORAL 3 TIMES DAILY PRN
Qty: 120 ML | Refills: 0 | Status: SHIPPED | OUTPATIENT
Start: 2023-09-03 | End: 2023-09-13

## 2023-09-03 RX ORDER — POTASSIUM CHLORIDE 750 MG/1
40 TABLET, FILM COATED, EXTENDED RELEASE ORAL ONCE
Status: COMPLETED | OUTPATIENT
Start: 2023-09-03 | End: 2023-09-03

## 2023-09-03 RX ORDER — POTASSIUM CHLORIDE 7.45 MG/ML
10 INJECTION INTRAVENOUS
Status: ACTIVE | OUTPATIENT
Start: 2023-09-03 | End: 2023-09-03

## 2023-09-03 RX ORDER — OXYCODONE HYDROCHLORIDE 5 MG/1
5 TABLET ORAL
Status: COMPLETED | OUTPATIENT
Start: 2023-09-03 | End: 2023-09-03

## 2023-09-03 RX ORDER — IPRATROPIUM BROMIDE AND ALBUTEROL SULFATE 2.5; .5 MG/3ML; MG/3ML
1 SOLUTION RESPIRATORY (INHALATION)
Status: COMPLETED | OUTPATIENT
Start: 2023-09-03 | End: 2023-09-03

## 2023-09-03 RX ADMIN — POTASSIUM CHLORIDE 40 MEQ: 750 TABLET, FILM COATED, EXTENDED RELEASE ORAL at 02:19

## 2023-09-03 RX ADMIN — OXYCODONE HYDROCHLORIDE 5 MG: 5 TABLET ORAL at 08:55

## 2023-09-03 RX ADMIN — METHYLPREDNISOLONE SODIUM SUCCINATE 125 MG: 125 INJECTION INTRAMUSCULAR; INTRAVENOUS at 08:55

## 2023-09-03 RX ADMIN — POTASSIUM CHLORIDE 40 MEQ: 750 TABLET, EXTENDED RELEASE ORAL at 09:52

## 2023-09-03 RX ADMIN — POTASSIUM CHLORIDE 10 MEQ: 7.46 INJECTION, SOLUTION INTRAVENOUS at 03:08

## 2023-09-03 RX ADMIN — IPRATROPIUM BROMIDE AND ALBUTEROL SULFATE 1 DOSE: 2.5; .5 SOLUTION RESPIRATORY (INHALATION) at 02:22

## 2023-09-03 RX ADMIN — IPRATROPIUM BROMIDE AND ALBUTEROL SULFATE 2 DOSE: 2.5; .5 SOLUTION RESPIRATORY (INHALATION) at 05:25

## 2023-09-03 ASSESSMENT — LIFESTYLE VARIABLES
HOW OFTEN DO YOU HAVE A DRINK CONTAINING ALCOHOL: NEVER
HOW MANY STANDARD DRINKS CONTAINING ALCOHOL DO YOU HAVE ON A TYPICAL DAY: PATIENT DOES NOT DRINK

## 2023-09-03 ASSESSMENT — PAIN SCALES - GENERAL: PAINLEVEL_OUTOF10: 10

## 2023-09-03 NOTE — ED TRIAGE NOTES
Pt picked up at a bus stop with sudden onset of SOB starting about 30 minutes ago. Sig wheezing bilaterally with improvement  with duo neb. Hx of asthma and COPD, no meds.  , 20 L H, Pt wearing 4L and sating at 100%

## 2023-09-03 NOTE — ED NOTES
Pt road tested and passed, O2 sat > 95% maintained with HR < 100. Aiyana DISLA notified. Family called by Case Management for Pt transportation and living arrangements. Family will  Pt in approx 1 hour.      0800 Chase Miguel RN  09/03/23 7738

## 2023-09-03 NOTE — CARE COORDINATION
Pt Nurse asked CM to talk with Pt, he stated that Pt is trying to get to [place]. CM met f/f with Pt, she stated that she just now moved up here and her Niece kicked her out and she wants to go back to [facility]. CM reviewed Pt medicals, Pt has been coming to this hospital for the last three years. Pt stated that she los there card with her money on it. Pt stated that she has been homeless since Monday. Pt gave CM two numbers to call:  Karlie Shila [phone], friend  Geoff Petersen, [phone], son    CM called Abner Rahman, left a  requesting a return call. CM called Roberto Carlos Yan, he stated that he is a family friend, Roberto Carlos Yan stated that they can arrange a ride for Pt. Roberto Carlos Yan asked to speak to Pt. CM called Roberto Carlos Yan and handed Pt the phone. Pt Pt started by saying you cannot ask me to choose between you and my son. Pt then went on to talk about her money and that she can go to the bank on Tuesday. Pt stated that Roberto Carlos Yan hung up on her. CM called Roberto Carlos Yan back, he stated that he will pick Pt up. Roberto Carlos Yan stated that it will take him about an hour and half.

## 2023-09-03 NOTE — ED PROVIDER NOTES
[facility] EMERGENCY DEPT  EMERGENCY DEPARTMENT HISTORY AND PHYSICAL EXAM      Date: 2023  Patient Name: Vipin Espinoza  MRN: [id number]  [address] [facility] [place] 1965  Date of evaluation: 2023  Provider: Noy Ruelas MD   Note Started: 5:38 AM EDT 9/3/23    HISTORY OF PRESENT ILLNESS     Chief Complaint   Patient presents with    Shortness of Breath       History Provided By: Patient    HPI: iVpin Espinoza is a 62 y.o. female past medical history COPD and emphysema presents for evaluation shortness of breath. Patient states despite taking her medication at home she had worsening shortness of breath. Additionally she also reports that she was recently asked to leave her sister's house and does not have a place to stay. Patient reports she has a brain mass that is being evaluated by her doctor in her hometown. No vision changes, headaches, or other symptoms. PAST MEDICAL HISTORY   Past Medical History:  Past Medical History:   Diagnosis Date    Asthma     COPD (chronic obstructive pulmonary disease) ([address])     Emphysema lung ([address])        Past Surgical History:  No past surgical history on file. Family History:  No family history on file. Social History:  Social History     Tobacco Use    Smoking status: Every Day     Packs/day: 1.00     Types: Cigarettes    Smokeless tobacco: Never    Tobacco comments:     Quit smokin years   Substance Use Topics    Alcohol use: Yes     Alcohol/week: 6.0 standard drinks    Drug use: Not Currently     Types: Marijuana Jacquenette Pun), Cocaine       Allergies:  No Known Allergies    PCP: None None    Current Meds:   No current facility-administered medications for this encounter.      Current Outpatient Medications   Medication Sig Dispense Refill    guaiFENesin-dextromethorphan (ROBITUSSIN DM) 100-10 MG/5ML syrup Take 5 mLs by mouth 3 times daily as needed for Cough 120 mL 0    predniSONE (DELTASONE) 20 MG tablet Take 3 tablets by mouth daily for 5 days 15 tablet 0    albuterol sulfate

## 2025-02-05 ENCOUNTER — HOSPITAL ENCOUNTER (EMERGENCY)
Facility: HOSPITAL | Age: 60
Discharge: HOME | End: 2025-02-05
Attending: EMERGENCY MEDICINE
Payer: MEDICAID

## 2025-02-05 VITALS
TEMPERATURE: 98 F | BODY MASS INDEX: 33.99 KG/M2 | OXYGEN SATURATION: 97 % | WEIGHT: 180 LBS | SYSTOLIC BLOOD PRESSURE: 138 MMHG | HEIGHT: 61 IN | HEART RATE: 82 BPM | RESPIRATION RATE: 16 BRPM | DIASTOLIC BLOOD PRESSURE: 96 MMHG

## 2025-02-05 DIAGNOSIS — Z59.00 HOMELESSNESS: ICD-10-CM

## 2025-02-05 DIAGNOSIS — F10.10 ALCOHOL ABUSE: Primary | ICD-10-CM

## 2025-02-05 PROCEDURE — 99282 EMERGENCY DEPT VISIT SF MDM: CPT

## 2025-02-05 SDOH — ECONOMIC STABILITY - HOUSING INSECURITY: HOMELESSNESS UNSPECIFIED: Z59.00

## 2025-02-05 ASSESSMENT — PAIN DESCRIPTION - LOCATION: LOCATION: ABDOMEN

## 2025-02-05 ASSESSMENT — LIFESTYLE VARIABLES
HOW OFTEN DO YOU HAVE A DRINK CONTAINING ALCOHOL: 4 OR MORE TIMES A WEEK
HOW MANY STANDARD DRINKS CONTAINING ALCOHOL DO YOU HAVE ON A TYPICAL DAY: 7 TO 9

## 2025-02-05 ASSESSMENT — PAIN SCALES - GENERAL: PAINLEVEL_OUTOF10: 9

## 2025-02-05 ASSESSMENT — PAIN DESCRIPTION - PAIN TYPE: TYPE: ACUTE PAIN

## 2025-02-05 ASSESSMENT — PAIN - FUNCTIONAL ASSESSMENT: PAIN_FUNCTIONAL_ASSESSMENT: 0-10

## 2025-02-05 NOTE — BSMART NOTE
Per provider request writer provided resources for substance abuse and housing support, no assessment needed at this time.  Writer informed nurse Neela that resources were provided.

## 2025-02-05 NOTE — BSMART NOTE
LOWELL spoke with provider Dr. Black.  Per provider resources for substance abuse would be helpful to patient.  Per provider wait on seeing patient for another hour and then provide resources and assessment as needed.      Writer informed nurse Keita for update.

## 2025-02-05 NOTE — ED TRIAGE NOTES
Pt presented to the ED stating she is looking for rehabilitation from crack and alcohol. Pt reports not having a home.

## 2025-02-05 NOTE — ED PROVIDER NOTES
and dry.      Capillary Refill: Capillary refill takes less than 2 seconds.      Findings: No rash.   Neurological:      General: No focal deficit present.      Mental Status: She is alert.      Cranial Nerves: No cranial nerve deficit.      Comments: No focal deficits, somewhat drowsy but arousable and conversant not in overt distress           SCREENINGS                  LAB, EKG AND DIAGNOSTIC RESULTS   Labs:  No results found for this or any previous visit (from the past 36 hour(s)).    EKG: Not Applicable    Radiologic Studies:  Non-plain film images such as CT, Ultrasound and MRI are read by the radiologist. Plain radiographic images are visualized and preliminarily interpreted by the ED Physician with the following findings: Not Applicable.    Interpretation per the Radiologist below, if available at the time of this note:  No orders to display        ED COURSE and DIFFERENTIAL DIAGNOSIS/MDM    Differential and Considerations:   MDM  Number of Diagnoses or Management Options  Alcohol abuse  Homelessness  Diagnosis management comments: Patient is a 59-year-old female who is presenting with a chief complaint of alcohol and drug use concern.  States she uses crack cocaine marijuana alcohol and occasionally meth.  Has no medical complaints.  Primary concern is trying to get off of drugs and alcohol.  Currently homeless.    Seems to be intoxicated as she is somewhat slow to respond we will watch her in the ED, she had workup earlier Garita and was discharged.  Will have be smart see her as well.  Given lack of any medical or physical symptoms I think labs are not indicated and less needed for behavioral health screening.         Records Reviewed (source and summary of external notes): Prior medical records and Nursing notes    Vitals:    Vitals:    02/05/25 0014 02/05/25 0415   BP: (!) 144/66 (!) 138/96   Pulse: 98 82   Resp: 18 16   Temp: 97.8 °F (36.6 °C) 98 °F (36.7 °C)   TempSrc: Oral    SpO2: 91% 97%

## 2025-02-05 NOTE — DISCHARGE INSTRUCTIONS
1.  Follow-up with one of the resources you were given regarding her alcohol abuse.    If you have worsening symptoms or you develop suicidal or homicidal ideations, or any medical complaints that are concerning to you please return.        Thank you for choosing our Emergency Department for your care.  It is our privilege to care for you in your time of need.  In the next several days, you may receive a survey via email or mailed to your home about your experience with our team.  We would greatly appreciate you taking a few minutes to complete the survey, as we use this information to learn what we have done well and what we could be doing better. Thank you for trusting us with your care!    Below you will find a list of your tests from today's visit.   Labs and Radiology Studies  No results found for this or any previous visit (from the past 12 hour(s)).  No results found.  ------------------------------------------------------------------------------------------------------------  The evaluation and treatment you received in the Emergency Department were for an urgent problem. It is important that you follow-up with a doctor, nurse practitioner, or physician assistant to:  (1) confirm your diagnosis,  (2) re-evaluation of changes in your illness and treatment, and (3) for ongoing care. Please take your discharge instructions with you when you go to your follow-up appointment.     If you have any problem arranging a follow-up appointment, contact us!  If your symptoms become worse or you do not improve as expected, please return to us. We are available 24 hours a day.     If a prescription has been provided, please fill it as soon as possible to prevent a delay in treatment. If you have any questions or reservations about taking the medication due to side effects or interactions with other medications, please call your primary care provider or contact us directly.  Again, THANK YOU for choosing us to care for